# Patient Record
Sex: FEMALE | Race: WHITE | Employment: OTHER | URBAN - NONMETROPOLITAN AREA
[De-identification: names, ages, dates, MRNs, and addresses within clinical notes are randomized per-mention and may not be internally consistent; named-entity substitution may affect disease eponyms.]

---

## 2017-01-16 ENCOUNTER — TRANSCRIBE ORDERS (OUTPATIENT)
Dept: ADMINISTRATIVE | Facility: HOSPITAL | Age: 82
End: 2017-01-16

## 2017-01-16 ENCOUNTER — HOSPITAL ENCOUNTER (OUTPATIENT)
Dept: PULMONOLOGY | Facility: HOSPITAL | Age: 82
Discharge: HOME | End: 2017-01-16
Attending: INTERNAL MEDICINE
Payer: COMMERCIAL

## 2017-01-16 ENCOUNTER — GENERIC CONVERSION - ENCOUNTER (OUTPATIENT)
Dept: OTHER | Facility: OTHER | Age: 82
End: 2017-01-16

## 2017-01-16 ENCOUNTER — HOSPITAL ENCOUNTER (OUTPATIENT)
Dept: RADIOLOGY | Facility: HOSPITAL | Age: 82
Discharge: HOME | End: 2017-01-16
Attending: INTERNAL MEDICINE
Payer: COMMERCIAL

## 2017-01-16 ENCOUNTER — APPOINTMENT (OUTPATIENT)
Dept: LAB | Facility: HOSPITAL | Age: 82
End: 2017-01-16
Attending: INTERNAL MEDICINE
Payer: COMMERCIAL

## 2017-01-16 DIAGNOSIS — E78.5 HYPERLIPIDEMIA: ICD-10-CM

## 2017-01-16 DIAGNOSIS — M81.0 OSTEOPOROSIS, UNSPECIFIED: ICD-10-CM

## 2017-01-16 DIAGNOSIS — R05.9 COUGH: ICD-10-CM

## 2017-01-16 DIAGNOSIS — R05.9 COUGH: Primary | ICD-10-CM

## 2017-01-16 LAB
25(OH)D3 SERPL-MCNC: 23.1 NG/ML (ref 30–100)
ALBUMIN SERPL BCP-MCNC: 3.8 G/DL (ref 3.5–5)
ALP SERPL-CCNC: 145 U/L (ref 46–116)
ALT SERPL W P-5'-P-CCNC: 21 U/L (ref 12–78)
ANION GAP SERPL CALCULATED.3IONS-SCNC: 10 MMOL/L (ref 4–13)
AST SERPL W P-5'-P-CCNC: 18 U/L (ref 5–45)
BILIRUB SERPL-MCNC: 0.4 MG/DL (ref 0.2–1)
BUN SERPL-MCNC: 12 MG/DL (ref 5–25)
CALCIUM SERPL-MCNC: 8.9 MG/DL (ref 8.3–10.1)
CHLORIDE SERPL-SCNC: 102 MMOL/L (ref 100–108)
CHOLEST SERPL-MCNC: 275 MG/DL (ref 50–200)
CO2 SERPL-SCNC: 27 MMOL/L (ref 21–32)
CREAT SERPL-MCNC: 0.84 MG/DL (ref 0.6–1.3)
GFR SERPL CREATININE-BSD FRML MDRD: >60 ML/MIN/1.73SQ M
GLUCOSE SERPL-MCNC: 91 MG/DL (ref 65–140)
HDLC SERPL-MCNC: 56 MG/DL (ref 40–60)
LDLC SERPL CALC-MCNC: 183 MG/DL (ref 0–100)
POTASSIUM SERPL-SCNC: 4.5 MMOL/L (ref 3.5–5.3)
PROT SERPL-MCNC: 7.3 G/DL (ref 6.4–8.2)
SODIUM SERPL-SCNC: 139 MMOL/L (ref 136–145)
TRIGL SERPL-MCNC: 180 MG/DL

## 2017-01-16 PROCEDURE — 94729 DIFFUSING CAPACITY: CPT

## 2017-01-16 PROCEDURE — 94727 GAS DIL/WSHOT DETER LNG VOL: CPT

## 2017-01-16 PROCEDURE — 36415 COLL VENOUS BLD VENIPUNCTURE: CPT

## 2017-01-16 PROCEDURE — 82306 VITAMIN D 25 HYDROXY: CPT

## 2017-01-16 PROCEDURE — 71020 HB CHEST X-RAY 2VW FRONTAL&LATL: CPT

## 2017-01-16 PROCEDURE — 80053 COMPREHEN METABOLIC PANEL: CPT

## 2017-01-16 PROCEDURE — 80061 LIPID PANEL: CPT

## 2017-01-16 PROCEDURE — 94060 EVALUATION OF WHEEZING: CPT

## 2017-01-16 RX ORDER — ALBUTEROL SULFATE 2.5 MG/3ML
2.5 SOLUTION RESPIRATORY (INHALATION) ONCE AS NEEDED
Status: DISCONTINUED | OUTPATIENT
Start: 2017-01-16 | End: 2017-01-20 | Stop reason: HOSPADM

## 2017-03-28 ENCOUNTER — ALLSCRIPTS OFFICE VISIT (OUTPATIENT)
Dept: OTHER | Facility: OTHER | Age: 82
End: 2017-03-28

## 2017-06-19 ENCOUNTER — HOSPITAL ENCOUNTER (EMERGENCY)
Facility: HOSPITAL | Age: 82
Discharge: HOME | End: 2017-06-19
Attending: EMERGENCY MEDICINE | Admitting: EMERGENCY MEDICINE
Payer: COMMERCIAL

## 2017-06-19 VITALS
SYSTOLIC BLOOD PRESSURE: 184 MMHG | BODY MASS INDEX: 24.04 KG/M2 | HEIGHT: 58 IN | TEMPERATURE: 97.6 F | WEIGHT: 114.5 LBS | RESPIRATION RATE: 18 BRPM | OXYGEN SATURATION: 98 % | DIASTOLIC BLOOD PRESSURE: 70 MMHG | HEART RATE: 72 BPM
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
## 2017-06-19 DIAGNOSIS — S50.869A: Primary | ICD-10-CM

## 2017-06-19 PROCEDURE — 99283 EMERGENCY DEPT VISIT LOW MDM: CPT

## 2017-06-19 RX ORDER — PREDNISONE 20 MG/1
60 TABLET ORAL ONCE
Status: COMPLETED | OUTPATIENT
Start: 2017-06-19 | End: 2017-06-19

## 2017-06-19 RX ORDER — PENICILLIN V POTASSIUM 250 MG/1
500 TABLET ORAL ONCE
Status: COMPLETED | OUTPATIENT
Start: 2017-06-19 | End: 2017-06-19

## 2017-06-19 RX ORDER — PREDNISONE 20 MG/1
TABLET ORAL
Qty: 15 TABLET | Refills: 0 | Status: SHIPPED | OUTPATIENT
Start: 2017-06-19 | End: 2018-03-29

## 2017-06-19 RX ORDER — CEPHALEXIN 500 MG/1
500 CAPSULE ORAL 4 TIMES DAILY
Qty: 28 CAPSULE | Refills: 0 | Status: SHIPPED | OUTPATIENT
Start: 2017-06-19 | End: 2017-06-26

## 2017-06-19 RX ORDER — HYDROXYZINE HYDROCHLORIDE 25 MG/1
25 TABLET, FILM COATED ORAL EVERY 6 HOURS
Qty: 20 TABLET | Refills: 0 | Status: SHIPPED | OUTPATIENT
Start: 2017-06-19 | End: 2018-03-29

## 2017-06-19 RX ORDER — HYDROXYZINE HYDROCHLORIDE 25 MG/1
25 TABLET, FILM COATED ORAL ONCE
Status: COMPLETED | OUTPATIENT
Start: 2017-06-19 | End: 2017-06-19

## 2017-06-19 RX ORDER — OMEPRAZOLE 20 MG/1
20 CAPSULE, DELAYED RELEASE ORAL DAILY
COMMUNITY
End: 2018-03-29

## 2017-06-19 RX ORDER — CITALOPRAM 10 MG/1
10 TABLET ORAL DAILY
COMMUNITY
End: 2018-01-25 | Stop reason: SDUPTHER

## 2017-06-19 RX ADMIN — PENICILLIN V POTASIUM 500 MG: 250 TABLET ORAL at 21:52

## 2017-06-19 RX ADMIN — HYDROXYZINE HYDROCHLORIDE 25 MG: 25 TABLET, FILM COATED ORAL at 21:52

## 2017-06-19 RX ADMIN — PREDNISONE 60 MG: 20 TABLET ORAL at 21:51

## 2017-06-21 ENCOUNTER — ALLSCRIPTS OFFICE VISIT (OUTPATIENT)
Dept: OTHER | Facility: OTHER | Age: 82
End: 2017-06-21

## 2017-06-29 ENCOUNTER — HOSPITAL ENCOUNTER (OUTPATIENT)
Dept: MAMMOGRAPHY | Facility: HOSPITAL | Age: 82
Discharge: HOME | End: 2017-06-29
Attending: SURGERY
Payer: COMMERCIAL

## 2017-06-29 DIAGNOSIS — Z12.31 ENCOUNTER FOR MAMMOGRAM TO ESTABLISH BASELINE MAMMOGRAM: ICD-10-CM

## 2017-06-29 DIAGNOSIS — C50.011 PAGET'S DISEASE OF THE BREAST, RIGHT (HCC): ICD-10-CM

## 2017-06-29 PROCEDURE — G0202 SCR MAMMO BI INCL CAD: HCPCS

## 2017-06-29 PROCEDURE — 77063 BREAST TOMOSYNTHESIS BI: CPT

## 2017-07-05 ENCOUNTER — GENERIC CONVERSION - ENCOUNTER (OUTPATIENT)
Dept: OTHER | Facility: OTHER | Age: 82
End: 2017-07-05

## 2017-07-13 ENCOUNTER — OFFICE VISIT (OUTPATIENT)
Dept: URGENT CARE | Facility: CLINIC | Age: 82
End: 2017-07-13
Payer: COMMERCIAL

## 2017-07-13 PROCEDURE — 99213 OFFICE O/P EST LOW 20 MIN: CPT

## 2017-07-17 ENCOUNTER — ALLSCRIPTS OFFICE VISIT (OUTPATIENT)
Dept: OTHER | Facility: OTHER | Age: 82
End: 2017-07-17

## 2017-08-01 ENCOUNTER — GENERIC CONVERSION - ENCOUNTER (OUTPATIENT)
Dept: OTHER | Facility: OTHER | Age: 82
End: 2017-08-01

## 2017-09-28 ENCOUNTER — ALLSCRIPTS OFFICE VISIT (OUTPATIENT)
Dept: OTHER | Facility: OTHER | Age: 82
End: 2017-09-28

## 2017-10-03 ENCOUNTER — TRANSCRIBE ORDERS (OUTPATIENT)
Dept: RADIOLOGY | Facility: MEDICAL CENTER | Age: 82
End: 2017-10-03

## 2017-10-03 ENCOUNTER — APPOINTMENT (OUTPATIENT)
Dept: LAB | Facility: MEDICAL CENTER | Age: 82
End: 2017-10-03
Payer: COMMERCIAL

## 2017-10-03 DIAGNOSIS — M81.0 SENILE OSTEOPOROSIS: ICD-10-CM

## 2017-10-03 DIAGNOSIS — M81.0 SENILE OSTEOPOROSIS: Primary | ICD-10-CM

## 2017-10-03 DIAGNOSIS — E78.5 OTHER AND UNSPECIFIED HYPERLIPIDEMIA: ICD-10-CM

## 2017-10-03 LAB
25(OH)D3 SERPL-MCNC: 31.3 NG/ML (ref 30–100)
LDLC SERPL DIRECT ASSAY-MCNC: 175 MG/DL (ref 0–100)
TRIGL SERPL-MCNC: 129 MG/DL

## 2017-10-03 PROCEDURE — 36415 COLL VENOUS BLD VENIPUNCTURE: CPT

## 2017-10-03 PROCEDURE — 84478 ASSAY OF TRIGLYCERIDES: CPT

## 2017-10-03 PROCEDURE — 83721 ASSAY OF BLOOD LIPOPROTEIN: CPT

## 2017-10-03 PROCEDURE — 82306 VITAMIN D 25 HYDROXY: CPT

## 2017-12-11 ENCOUNTER — ALLSCRIPTS OFFICE VISIT (OUTPATIENT)
Dept: OTHER | Facility: OTHER | Age: 82
End: 2017-12-11

## 2017-12-11 ENCOUNTER — APPOINTMENT (OUTPATIENT)
Dept: LAB | Facility: MEDICAL CENTER | Age: 82
End: 2017-12-11
Payer: COMMERCIAL

## 2017-12-11 ENCOUNTER — TRANSCRIBE ORDERS (OUTPATIENT)
Dept: LAB | Facility: MEDICAL CENTER | Age: 82
End: 2017-12-11

## 2017-12-11 DIAGNOSIS — R10.31 ABDOMINAL PAIN, RIGHT LOWER QUADRANT: Primary | ICD-10-CM

## 2017-12-11 LAB
BILIRUB UR QL STRIP: NEGATIVE
CLARITY UR: CLEAR
COLOR UR: YELLOW
GLUCOSE UR STRIP-MCNC: NEGATIVE MG/DL
HGB UR QL STRIP.AUTO: NEGATIVE
KETONES UR STRIP-MCNC: NEGATIVE MG/DL
LEUKOCYTE ESTERASE UR QL STRIP: NEGATIVE
NITRITE UR QL STRIP: NEGATIVE
PH UR STRIP.AUTO: 6 [PH] (ref 4.5–8)
PROT UR STRIP-MCNC: NEGATIVE MG/DL
SP GR UR STRIP.AUTO: 1.01 (ref 1–1.03)
UROBILINOGEN UR QL STRIP.AUTO: 0.2 E.U./DL

## 2017-12-11 PROCEDURE — 81003 URINALYSIS AUTO W/O SCOPE: CPT | Performed by: INTERNAL MEDICINE

## 2017-12-12 NOTE — PROGRESS NOTES
Assessment    1  Right lower quadrant abdominal pain (789 03) (R10 31)  2  Esophageal reflux (306 81) (K21 9)    Plan  Esophageal reflux    · Start: Omeprazole 40 MG Oral Capsule Delayed Release; Take 1 daily  Right lower quadrant abdominal pain    · (1) URINALYSIS w URINE C/S REFLEX (will reflex a microscopy if leukocytes, occultblood, or nitrites are not within normal limits); Status:Active; Requested for:[id number];    · * CT CHEST ABDOMEN PELVIS WO CONTRAST; Status:Need Information - FinancialAuthorization; Requested for:[id number];     Discussion/Summary    Rx omeprazole daily, Bernardine Saran diet reviewed and encouraged  Increase fluids  CT scan of abdomen to rule out acute process, Can use colace until results available  Rest,fluids,bland diet as tolerated1     Possible side effects of new medications were reviewed with the patient/guardian today  The treatment plan was reviewed with the patient/guardian  The patient/guardian understands and agrees with the treatment plan      Self Referrals: No       1 Amended By: Kameron Weinstein; Dec 11 2017 12:04 PM EST    Chief Complaint  Pt presents for sick visit  Pt has c/o mid epigastric pain, loss of appetite, belching and constipation  States she has been eating mainly broth the past few days due to symptoms  No OTC meds for symptoms  No falls  No refills needed today  History of Present Illness  HPI: Patient has had abdominal pain right sided mainly for about a week  Rich diet  Has been constipated  No fever/chills  Has been nauseous  NBowels sluggish for a week or longer,decrease appetite  Belching more  Review of Systems   Constitutional: no fever-- and-- no chills  ENT: no ear ache, no loss of hearing, no nosebleeds or nasal discharge, no sore throat or hoarseness  Cardiovascular: no complaints of slow or fast heart rate, no chest pain, no palpitations, no leg claudication or lower extremity edema    Respiratory: no complaints of shortness of breath, no wheezing, no dyspnea on exertion, no orthopnea or PND  Gastrointestinal: abdominal pain,-- nausea-- and-- constipation, but-- no vomiting  Genitourinary: no complaints of dysuria, no incontinence, no pelvic pain, no dysmenorrhea, no vaginal discharge or abnormal vaginal bleeding  Musculoskeletal: arthralgias  Integumentary: no complaints of skin rash or lesion, no itching or dry skin, no skin wounds  Neurological: no complaints of headache, no confusion, no numbness or tingling, no dizziness or fainting  Active Problems  1  Advance directive discussed with patient (V65 49) (Z71 89)  2  Anxiety (300 00) (F41 9)  3  Breast cancer (174 9) (C50 919)  4  Cellulitis of extremity (L03 119)  5  Cellulitis of left upper extremity (682 3) (L03 114)  6  Cough (786 2) (R05)  7  Hiatal hernia (553 3) (K44 9)  8  Hyperlipidemia (272 4) (E78 5)  9  Post-menopausal osteoporosis (733 01) (M81 0)  10  Refused influenza vaccine (V64 06) (Z28 21)  11  Refused pneumococcal vaccination (V64 06) (Z28 21)    Past Medical History  Active Problems And Past Medical History Reviewed: The active problems and past medical history were reviewed and updated today  Surgical History  Surgical History Reviewed: The surgical history was reviewed and updated today  Social History     · Caffeine Use   · Dental care, regularly   · Denied: Drug use (305 90) (F19 90)   · Good dental hygiene   · Never a smoker   · No alcohol use   · Uses Safety Equipment - Seatbelts   ·   The social history was reviewed and updated today  The social history was reviewed and is unchanged  Family History  Family History Reviewed: The family history was reviewed and updated today  Current Meds  1  Citalopram Hydrobromide 10 MG Oral Tablet; TAKE ONE (1) TABLET DAILY; Therapy: 55WTX5563 to (Evaluate:33Hdb2370)  Requested for: 28Mar2017; Last Rx:28Mar2017 Ordered  2  Lumigan 0 01 % Ophthalmic Solution;  Therapy: 81WCI3887 to (Last Rx:63Kmb9318)  Requested for: 06NCM4788 Ordered  3  Triamcinolone Acetonide 0 025 % External Lotion; APPLY 2-3 TIMES DAILY TO AFFECTED AREA(S); Therapy: 19SOW9675 to (Last Rx:71Yeu2541)  Requested for: 70Hbl2763 Ordered    The medication list was reviewed and updated today  Allergies  1  No Known Drug Allergies  2  Adhesive Tape    Vitals   Recorded: R9912921 10:12AM Recorded: 85Klw4976 09:59AM   Temperature  95 3 F, Tympanic   Heart Rate  96   Systolic 386    Diastolic 72    Height  4 ft 10 in   Weight  109 lb 2 oz   BMI Calculated  22 81   BSA Calculated  1 41   O2 Saturation  100, RA       Physical Exam   Constitutional  General appearance: No acute distress, well appearing and well nourished  Eyes  Conjunctiva and lids: No swelling, erythema or discharge  Pupils and irises: Equal, round and reactive to light  Ears, Nose, Mouth, and Throat  External inspection of ears and nose: Normal    Otoscopic examination: Tympanic membranes translucent with normal light reflex  Canals patent without erythema  Nasal mucosa, septum, and turbinates: Normal without edema or erythema  Oropharynx: Normal with no erythema, edema, exudate or lesions  Pulmonary  Respiratory effort: No increased work of breathing or signs of respiratory distress  Auscultation of lungs: Clear to auscultation  Cardiovascular  Palpation of heart: Normal PMI, no thrills  Auscultation of heart: Normal rate and rhythm, normal S1 and S2, without murmurs  Examination of extremities for edema and/or varicosities: Normal    Carotid pulses: Normal    Abdomen  Abdomen: Abnormal  -- Ttp midepigastric and rlq area some guarding1   Liver and spleen: No hepatomegaly or splenomegaly  Lymphatic  Palpation of lymph nodes in neck: No lymphadenopathy  Musculoskeletal  Gait and station: Normal    Digits and nails: Normal without clubbing or cyanosis     Inspection/palpation of joints, bones, and muscles: Normal    Skin  Skin and subcutaneous tissue: Normal without rashes or lesions  Neurologic  Cranial nerves: Cranial nerves 2-12 intact  Reflexes: 2+ and symmetric  Sensation: No sensory loss     Psychiatric  Orientation to person, place, and time: Normal    Mood and affect: Normal           1 Amended By: Arian Smith; Dec 11 2017 12:03 PM EST    Future Appointments    Date/Time Provider Specialty Site   01/25/2018 09:30 AM Arian Smith DO Internal Medicine [facility] [place] [facility] [address]       Signatures   Electronically signed by : Rafael Smith DO; Dec 11 2017 12:04PM EST                       (Author)

## 2017-12-13 ENCOUNTER — HOSPITAL ENCOUNTER (OUTPATIENT)
Dept: CT IMAGING | Facility: HOSPITAL | Age: 82
Discharge: HOME | End: 2017-12-13
Attending: INTERNAL MEDICINE
Payer: COMMERCIAL

## 2017-12-13 ENCOUNTER — GENERIC CONVERSION - ENCOUNTER (OUTPATIENT)
Dept: OTHER | Facility: OTHER | Age: 82
End: 2017-12-13

## 2017-12-13 DIAGNOSIS — R10.31 ABDOMINAL PAIN, RIGHT LOWER QUADRANT: ICD-10-CM

## 2017-12-13 PROCEDURE — 71250 CT THORAX DX C-: CPT

## 2017-12-13 PROCEDURE — 74176 CT ABD & PELVIS W/O CONTRAST: CPT

## 2018-01-14 VITALS
SYSTOLIC BLOOD PRESSURE: 124 MMHG | HEIGHT: 58 IN | TEMPERATURE: 97.3 F | DIASTOLIC BLOOD PRESSURE: 78 MMHG | OXYGEN SATURATION: 98 % | WEIGHT: 112.38 LBS | BODY MASS INDEX: 23.59 KG/M2 | HEART RATE: 85 BPM

## 2018-01-14 VITALS
TEMPERATURE: 98.2 F | OXYGEN SATURATION: 99 % | BODY MASS INDEX: 24.01 KG/M2 | DIASTOLIC BLOOD PRESSURE: 78 MMHG | HEART RATE: 64 BPM | WEIGHT: 114.38 LBS | HEIGHT: 58 IN | SYSTOLIC BLOOD PRESSURE: 122 MMHG

## 2018-01-14 VITALS
WEIGHT: 111.5 LBS | BODY MASS INDEX: 23.41 KG/M2 | OXYGEN SATURATION: 98 % | SYSTOLIC BLOOD PRESSURE: 124 MMHG | TEMPERATURE: 97 F | HEIGHT: 58 IN | HEART RATE: 72 BPM | DIASTOLIC BLOOD PRESSURE: 72 MMHG

## 2018-01-15 VITALS
SYSTOLIC BLOOD PRESSURE: 124 MMHG | DIASTOLIC BLOOD PRESSURE: 72 MMHG | TEMPERATURE: 97.6 F | WEIGHT: 110.25 LBS | HEART RATE: 86 BPM | BODY MASS INDEX: 23.14 KG/M2 | OXYGEN SATURATION: 98 % | HEIGHT: 58 IN

## 2018-01-23 VITALS
HEART RATE: 96 BPM | WEIGHT: 109.13 LBS | BODY MASS INDEX: 22.91 KG/M2 | DIASTOLIC BLOOD PRESSURE: 72 MMHG | SYSTOLIC BLOOD PRESSURE: 124 MMHG | OXYGEN SATURATION: 100 % | TEMPERATURE: 95.3 F | HEIGHT: 58 IN

## 2018-01-24 PROBLEM — Z78.9: Status: ACTIVE | Noted: 2018-01-24

## 2018-01-24 PROBLEM — Z28.21 REFUSED INFLUENZA VACCINE: Status: ACTIVE | Noted: 2018-01-24

## 2018-01-24 PROBLEM — M81.0 POST-MENOPAUSAL OSTEOPOROSIS: Status: ACTIVE | Noted: 2018-01-24

## 2018-01-24 PROBLEM — E78.5 HYPERLIPIDEMIA: Status: ACTIVE | Noted: 2018-01-24

## 2018-01-24 PROBLEM — K44.9 HIATAL HERNIA: Status: ACTIVE | Noted: 2018-01-24

## 2018-01-24 PROBLEM — F41.9 ANXIETY: Status: ACTIVE | Noted: 2018-01-24

## 2018-01-24 PROBLEM — C50.919 MALIGNANT NEOPLASM OF BREAST (HCC): Status: ACTIVE | Noted: 2018-01-24

## 2018-01-24 PROBLEM — K21.9 ESOPHAGEAL REFLUX: Status: ACTIVE | Noted: 2018-01-24

## 2018-01-24 RX ORDER — CITALOPRAM 10 MG/1
1 TABLET ORAL DAILY
COMMUNITY
Start: 2013-11-04 | End: 2018-01-25 | Stop reason: SDUPTHER

## 2018-01-24 RX ORDER — PROMETHAZINE HYDROCHLORIDE AND CODEINE PHOSPHATE 6.25; 1 MG/5ML; MG/5ML
5 SYRUP ORAL EVERY 6 HOURS PRN
COMMUNITY
Start: 2017-09-28 | End: 2018-01-25 | Stop reason: SDUPTHER

## 2018-01-24 RX ORDER — OMEPRAZOLE 40 MG/1
1 CAPSULE, DELAYED RELEASE ORAL DAILY
COMMUNITY
Start: 2017-12-11 | End: 2018-03-29

## 2018-01-25 ENCOUNTER — OFFICE VISIT (OUTPATIENT)
Dept: INTERNAL MEDICINE CLINIC | Facility: CLINIC | Age: 83
End: 2018-01-25
Payer: COMMERCIAL

## 2018-01-25 VITALS
SYSTOLIC BLOOD PRESSURE: 130 MMHG | HEIGHT: 58 IN | OXYGEN SATURATION: 97 % | HEART RATE: 67 BPM | BODY MASS INDEX: 22.88 KG/M2 | TEMPERATURE: 95.8 F | DIASTOLIC BLOOD PRESSURE: 72 MMHG | WEIGHT: 109 LBS

## 2018-01-25 DIAGNOSIS — F32.9 REACTIVE DEPRESSION: ICD-10-CM

## 2018-01-25 DIAGNOSIS — M81.0 AGE-RELATED OSTEOPOROSIS WITHOUT CURRENT PATHOLOGICAL FRACTURE: ICD-10-CM

## 2018-01-25 DIAGNOSIS — Z00.00 MEDICARE ANNUAL WELLNESS VISIT, SUBSEQUENT: Primary | ICD-10-CM

## 2018-01-25 DIAGNOSIS — R05.9 COUGH: ICD-10-CM

## 2018-01-25 PROCEDURE — G0439 PPPS, SUBSEQ VISIT: HCPCS | Performed by: INTERNAL MEDICINE

## 2018-01-25 RX ORDER — CITALOPRAM 10 MG/1
10 TABLET ORAL DAILY
Qty: 30 TABLET | Refills: 5 | Status: SHIPPED | OUTPATIENT
Start: 2018-01-25 | End: 2018-03-29

## 2018-01-25 RX ORDER — PROMETHAZINE HYDROCHLORIDE AND CODEINE PHOSPHATE 6.25; 1 MG/5ML; MG/5ML
5 SYRUP ORAL EVERY 6 HOURS PRN
Qty: 120 ML | Refills: 1 | Status: SHIPPED | OUTPATIENT
Start: 2018-01-25 | End: 2018-05-01 | Stop reason: ALTCHOICE

## 2018-01-25 NOTE — PROGRESS NOTES
HPI:  Gaby Weinberg is a [de-identified] y o  female here for her Subsequent Wellness Visit      Patient Active Problem List   Diagnosis    Anxiety    Malignant neoplasm of breast ([address] )    Esophageal reflux    Hiatal hernia    Hyperlipidemia    Post-menopausal osteoporosis    Refused influenza vaccine    Consent not given for pneumococcal immunization    Cough     Past Medical History:   Diagnosis Date    Cellulitis of left upper arm     History of anxiety     History of appendicitis     History of breast cancer     History of cancer     breast    History of gastroesophageal reflux (GERD)      Past Surgical History:   Procedure Laterality Date    APPENDECTOMY      Date Unknown    CATARACT EXTRACTION Bilateral     Left 2008 // Right 2008     SECTION      Date Unknown    MASTECTOMY Right 2007    PARTIAL HYSTERECTOMY      fallopian tube removed, ? side    TUBAL LIGATION      Date Unknown     Family History   Problem Relation Age of Onset    Lung cancer Mother     Colon cancer Sister     Hypertension Family     Drug abuse Neg Hx      History   Smoking Status    Never Smoker   Smokeless Tobacco    Never Used     History   Alcohol Use No      History   Drug Use No       Current Outpatient Prescriptions   Medication Sig Dispense Refill    bimatoprost (LUMIGAN) 0 01 % ophthalmic drops Administer 1 drop to both eyes daily at bedtime      citalopram (CeleXA) 10 mg tablet Take 1 tablet by mouth daily 30 tablet 5    promethazine-codeine (PHENERGAN WITH CODEINE) 6 25-10 mg/5 mL syrup Take 5 mL by mouth every 6 (six) hours as needed (cough) 120 mL 1    hydrOXYzine HCL (ATARAX) 25 mg tablet Take 1 tablet by mouth every 6 (six) hours Prn itching 20 tablet 0    omeprazole (PriLOSEC) 20 mg delayed release capsule Take 20 mg by mouth daily      omeprazole (PriLOSEC) 40 MG capsule Take 1 capsule by mouth daily      predniSONE 20 mg tablet 60 mg po qd x 5 days 15 tablet 0     No current facility-administered medications for this visit  Allergies   Allergen Reactions    Other Itching     Adhesive Tape     There is no immunization history for the selected administration types on file for this patient      Patient Care Team:  Lindsey Yao, DO as PCP - General  MD Jazmyne Anna MD Laurice Fearing, DO      Medicare Screening Tests and Risk Assessments:  AWV Clinical     ISAR:       Once in a Lifetime Medicare Screening:   EKG performed?:  No    AAA screening performed? (if performed, please add date to Health Maintenance):  No       Medicare Screening Tests and Risk Assessment:   AAA Risk Assessment     Family history of AAA:  No   Osteoporosis Risk Assessment     Female:  Yes   :  Yes :  No   Age over 48:  Yes Low body weight (<127lbs):  Yes   Tobacco use:  No Alcohol use:  No   Low calcium diet:  No PMHX of fractures:  No   HIV Risk Assessment    None indicated:  Yes        Drug and Alcohol Use:   Tobacco use    Tobacco use duration    Tobacco Cessation Readiness    Alcohol use    Alcohol Treatment Readiness   Illicit Drug Use        Diet & Exercise:   Diet   What is your diet?:  Regular   How many servings a day of the following:   Exercise    Do you currently exercise?:  currently not exercising       Cognitive Impairment Screening:   Cognitive Impairment Screening        Functional Ability/Level of Safety:   Hearing    Hearing difficulties:  No    Hearing Impairment Assessment    Current Activities    Status:  unlimited ADL's   Help needed with the folllowing:    ADL    Fall Risk   Have you fallen in the last 12 months?:  No Are you unsteady on your feet?:  Yes   Injury History       Home Safety:   Are there hazards in your environment?:  No   Home Safety Risk Factors   Unfamilar with surroundings:  No Uneven floors:  No   Stairs or handrail saftey risk:  Yes Loose rugs:  No   Household clutter:  No Poor household lighting:  No   No grab bars in bathroom:  No Further evaluation needed:  No       Advanced Directives:   Advanced Directives    Living Will:  Yes    Advanced directive:  Yes    Patient's End of Life Decisions        Urinary Incontinence:   Do you have urinary incontinence?:  No Do you have incomplete emptying?:  No   Do you urinate frequently?:  No Do you have urinary urgency?:  No   Do you have urinary hesitancy?:  No Do you have dysuria (painful and/or difficult urination)?:  No   Do you have nocturia (waking up to urinate)?:  Yes Do you strain when urinating (have to push to urinate)?:  No   Do you have a weak stream when urinating?:  No Do you have intermittent streaming when urinating?:  No   Do you dribble urine after finishing?:  No    Do you have vaginal pressure?:  No Do you have vaginal dryness?:  No       Glaucoma:            Provider Screening     Preventative Screening/Counseling:   Cardiovascular Screening/Counseling:   (Labs Q5 years, EKG optional one-time)         Diabetes Screening/Counseling:   (2 tests/year if Pre-Diabetes or 1 test/year if no Diabetes)         Colorectal Cancer Screening/Counseling:   (FOBT Q1 yr; Flex Sig Q4 yrs or Q10 yrs after Screening Colonoscopy; Screening Colonoscpy Q2 yrs High Risk or Q10 yrs Low Risk; Barium Enema Q2 yrs High Risk or Q4 yrs Low Risk)         Prostate Cancer Screening/Counseling:   (Annual)          Breast Cancer Screening/Counseling:   (Baseline Age 28 - 43; Annual Age 36+)         Cervical Cancer Screening/Counseling:   (Annual for High Risk or Childbearing Age with Abnormal Pap in Last 3 yrs; Every 2 all others)         Osteoporosis Screening/Counseling:   (Every 2 Yrs if at risk or more if medically necessary)         AAA Screening/Counseling:   (Once per Lifetime with risk factors)    Family History of AAA:  No           Glaucoma Screening/Counseling:   (Annual)         HIV Screening/Counseling:   (Voluntary;  Once annually for high risk OR 3 times for Pregnancy at diagnosis of IUP; 3rd trimester; and at Labor         Hepatitis C Screening:             Immunizations: Other Preventative Couseling (Non-Medicare Wellness Visit Required):       Referrals (Non-Medicare Wellness Visit Required):       Medical Equipment/Suppliers:           No exam data present  Reviewed Updated [facility] Prior Wellness Visits:   Last Medicare wellness visit information was reviewed, patient interviewed , no change since last AWVyes  Last Medicare wellness visit information was reviewed, patient interviewed and updates made to the record today yes    Assessment and Plan:  1  Medicare annual wellness visit, subsequent     2  Age-related osteoporosis without current pathological fracture  DXA bone density spine hip and pelvis   3  Reactive depression  citalopram (CeleXA) 10 mg tablet   4   Cough  promethazine-codeine (PHENERGAN WITH CODEINE) 6 25-10 mg/5 mL syrup     Dexa scan ordered and she will get in the spring  Increase fluids intake and encouraged home exercise program  Patient deferred flu or pneumonia shot today  She will monitor her BP at home as for her the reading was higher today and call sooner if trend increase  Encouraged increased protein in diet as eating habits are not consistent  Followup in 3 months/prn  Health Maintenance Due   Topic Date Due    SLP PLAN OF CARE  1935    PNEUMOCOCCAL POLYSACCHARIDE VACCINE AGE 72 AND OVER  09/05/2002

## 2018-03-29 ENCOUNTER — TELEPHONE (OUTPATIENT)
Dept: INTERNAL MEDICINE CLINIC | Facility: CLINIC | Age: 83
End: 2018-03-29

## 2018-03-29 ENCOUNTER — APPOINTMENT (EMERGENCY)
Dept: RADIOLOGY | Facility: HOSPITAL | Age: 83
End: 2018-03-29
Payer: COMMERCIAL

## 2018-03-29 ENCOUNTER — HOSPITAL ENCOUNTER (EMERGENCY)
Facility: HOSPITAL | Age: 83
Discharge: HOME | End: 2018-03-29
Payer: COMMERCIAL

## 2018-03-29 VITALS
SYSTOLIC BLOOD PRESSURE: 167 MMHG | HEIGHT: 58 IN | BODY MASS INDEX: 23.88 KG/M2 | RESPIRATION RATE: 19 BRPM | HEART RATE: 90 BPM | OXYGEN SATURATION: 100 % | DIASTOLIC BLOOD PRESSURE: 77 MMHG | WEIGHT: 113.76 LBS | TEMPERATURE: 98.5 F

## 2018-03-29 DIAGNOSIS — J40 BRONCHITIS: Primary | ICD-10-CM

## 2018-03-29 DIAGNOSIS — J02.9 SORE THROAT: Primary | ICD-10-CM

## 2018-03-29 PROCEDURE — 71046 X-RAY EXAM CHEST 2 VIEWS: CPT

## 2018-03-29 PROCEDURE — 94640 AIRWAY INHALATION TREATMENT: CPT

## 2018-03-29 PROCEDURE — 99283 EMERGENCY DEPT VISIT LOW MDM: CPT

## 2018-03-29 RX ORDER — ALBUTEROL SULFATE 90 UG/1
2 AEROSOL, METERED RESPIRATORY (INHALATION) EVERY 4 HOURS PRN
Qty: 1 INHALER | Refills: 0 | Status: SHIPPED | OUTPATIENT
Start: 2018-03-29 | End: 2019-11-08 | Stop reason: ALTCHOICE

## 2018-03-29 RX ORDER — DOXYCYCLINE HYCLATE 100 MG/1
100 CAPSULE ORAL 2 TIMES DAILY
Qty: 14 CAPSULE | Refills: 0 | Status: SHIPPED | OUTPATIENT
Start: 2018-03-29 | End: 2018-04-05

## 2018-03-29 RX ORDER — ALBUTEROL SULFATE 2.5 MG/3ML
2.5 SOLUTION RESPIRATORY (INHALATION) ONCE
Status: COMPLETED | OUTPATIENT
Start: 2018-03-29 | End: 2018-03-29

## 2018-03-29 RX ORDER — AMOXICILLIN 500 MG/1
500 TABLET, FILM COATED ORAL 3 TIMES DAILY
Qty: 21 TABLET | Refills: 0 | Status: SHIPPED | OUTPATIENT
Start: 2018-03-29 | End: 2018-03-29

## 2018-03-29 RX ADMIN — ALBUTEROL SULFATE 2.5 MG: 2.5 SOLUTION RESPIRATORY (INHALATION) at 11:03

## 2018-03-29 NOTE — ED PROVIDER NOTES
History  Chief Complaint   Patient presents with    URI     sore throat, swollen glands  Cough with expectoration yellow mucus  right ear pain  Patient presents to the emergency department today via private vehicle offering a chief complaint nasal congestion sore throat and a productive cough that has been present about 3-4 days  Patient denies chest pain  She denies back pain nausea or vomiting  She has felt chilled  No history of diaphoresis  No underlying lung disease  Nonsmoker  Patient states the cough produces yellow thick mucus  Prior to Admission Medications   Prescriptions Last Dose Informant Patient Reported? Taking?   bimatoprost (LUMIGAN) 0 01 % ophthalmic drops   Yes Yes   Sig: Administer 1 drop to both eyes daily at bedtime   promethazine-codeine (PHENERGAN WITH CODEINE) 6 25-10 mg/5 mL syrup   No Yes   Sig: Take 5 mL by mouth every 6 (six) hours as needed (cough)      Facility-Administered Medications: None       Past Medical History:   Diagnosis Date    Cellulitis of left upper arm     History of anxiety     History of appendicitis     History of breast cancer     History of cancer     breast    History of gastroesophageal reflux (GERD)        Past Surgical History:   Procedure Laterality Date    APPENDECTOMY      Date Unknown    CATARACT EXTRACTION Bilateral     Left 2008 // Right 2008     SECTION      Date Unknown    MASTECTOMY Right 2007    PARTIAL HYSTERECTOMY      fallopian tube removed, ? side    TUBAL LIGATION      Date Unknown       Family History   Problem Relation Age of Onset    Lung cancer Mother     Colon cancer Sister     Hypertension Family     Drug abuse Neg Hx      I have reviewed and agree with the history as documented  Social History   Substance Use Topics    Smoking status: Never Smoker    Smokeless tobacco: Never Used    Alcohol use No        Review of Systems   Constitutional: Positive for chills   Negative for activity change, appetite change, diaphoresis, fatigue, fever and unexpected weight change  HENT: Positive for congestion, ear pain, postnasal drip and sore throat  Negative for dental problem, drooling, ear discharge, facial swelling, hearing loss, mouth sores, nosebleeds, rhinorrhea, sinus pain, sinus pressure, sneezing, tinnitus, trouble swallowing and voice change  Eyes: Negative  Respiratory: Positive for cough  Negative for apnea, chest tightness and stridor  Cardiovascular: Negative  Gastrointestinal: Negative  Endocrine: Negative  Genitourinary: Negative  Musculoskeletal: Negative  Skin: Negative  Allergic/Immunologic: Negative  Neurological: Negative  Hematological: Negative  Psychiatric/Behavioral: Negative  All other systems reviewed and are negative  Physical Exam  ED Triage Vitals [03/29/18 1022]   Temperature Pulse Respirations Blood Pressure SpO2   98 5 °F (36 9 °C) 92 20 (!) 184/83 100 %      Temp Source Heart Rate Source Patient Position - Orthostatic VS BP Location FiO2 (%)   Temporal Monitor Sitting Left arm --      Pain Score       No Pain           Orthostatic Vital Signs  Vitals:    03/29/18 1100 03/29/18 1115 03/29/18 1130 03/29/18 1145   BP: (!) 171/77      Pulse: 89 91 88 91   Patient Position - Orthostatic VS:           Physical Exam   Constitutional: She is oriented to person, place, and time  She appears well-developed and well-nourished  No distress  HENT:   Head: Normocephalic and atraumatic  Right Ear: External ear normal    Left Ear: External ear normal    Nose: Nose normal    Mouth/Throat: Oropharynx is clear and moist  No oropharyngeal exudate  Eyes: Pupils are equal, round, and reactive to light  Neck: Normal range of motion  Cardiovascular: Normal rate, regular rhythm, normal heart sounds and intact distal pulses  Exam reveals no gallop and no friction rub  No murmur heard    Pulmonary/Chest: Effort normal  No respiratory distress  Scattered rhonchi that clears with cough   Abdominal: Soft  Musculoskeletal: Normal range of motion  Neurological: She is alert and oriented to person, place, and time  Skin: Skin is warm  Capillary refill takes less than 2 seconds  She is not diaphoretic  Psychiatric: She has a normal mood and affect  Vitals reviewed  ED Medications  Medications   albuterol inhalation solution 2 5 mg (2 5 mg Nebulization Given 3/29/18 1103)       Diagnostic Studies  Results Reviewed     None                 XR chest 2 views   ED Interpretation by Chester Sutton PA-C (03/29 1244)   No evidence of acute pulmonary process, similar to prior chest x-ray                 Procedures  Procedures       Phone Contacts  ED Phone Contact    ED Course  ED Course as of Mar 29 1253   Thu Mar 29, 2018   1046 SpO2: 100 %   1047 Respirations: 20   1047 Pulse: 92   1047 Blood Pressure: (!) 184/83   1113 Patient received a nebulizer treatment currently    1203 Patient feeling muchImproved after nebulizer treatment    1244 Patient was re-examined at 1245 hours  Patient states she feels much improved and wishes to be discharged  I advised that we will treat with doxycycline as well as an inhaler and I went over and had a utilize the inhaler  MDM  CritCare Time    Disposition  Final diagnoses:   Bronchitis     Time reflects when diagnosis was documented in both MDM as applicable and the Disposition within this note     Time User Action Codes Description Comment    3/29/2018 12:49 PM Robel Chong 49 Bronchitis       ED Disposition     ED Disposition Condition Comment    Discharge  45 Rocio Lowery discharge to home/self care      Condition at discharge: Good        Follow-up Information     Follow up With Specialties Details [facility] Emergency Department Emergency Medicine  If symptoms worsen [address] [place]  [phone] [place] [facility], [address], [place], [address] [facility],   Flora Ball 26, DO Internal Medicine Schedule an appointment as soon as possible for a visit  [address]  [address]  [phone]           Patient's Medications   Discharge Prescriptions    ALBUTEROL (PROVENTIL HFA,VENTOLIN HFA) 90 MCG/ACT INHALER    Inhale 2 puffs every 4 (four) hours as needed for wheezing       Start Date: 3/29/2018 End Date: --       Order Dose: 2 puffs       Quantity: 1 Inhaler    Refills: 0    DOXYCYCLINE HYCLATE (VIBRAMYCIN) 100 MG CAPSULE    Take 1 capsule (100 mg total) by mouth 2 (two) times a day for 7 days       Start Date: 3/29/2018 End Date: 4/5/2018       Order Dose: 100 mg       Quantity: 14 capsule    Refills: 0     No discharge procedures on file      ED Provider  Electronically Signed by           Nicky Lake PA-C  03/29/18 7139

## 2018-03-29 NOTE — ED NOTES
Pt states that she is feeling much better after breathing treatment   O2 level : 100% on 2L oxygen     Philip Bartlett RN  03/29/18 7626

## 2018-03-29 NOTE — DISCHARGE INSTRUCTIONS
Acute Bronchitis   WHAT YOU NEED TO KNOW:   Acute bronchitis is swelling and irritation in the air passages of your lungs  This irritation may cause you to cough or have other breathing problems  Acute bronchitis often starts because of another illness, such as a cold or the flu  The illness spreads from your nose and throat to your windpipe and airways  Bronchitis is often called a chest cold  Acute bronchitis lasts about 3 to 6 weeks and is usually not a serious illness  Your cough can last for several weeks  DISCHARGE INSTRUCTIONS:   Return to the emergency department if:   · You cough up blood  · Your lips or fingernails turn blue  · You feel like you are not getting enough air when you breathe  Contact your healthcare provider if:   · You have a fever  · Your breathing problems do not go away or get worse  · Your cough does not get better within 4 weeks  · You have questions or concerns about your condition or care  Self-care:   · Get more rest   Rest helps your body to heal  Slowly start to do more each day  Rest when you feel it is needed  · Avoid irritants in the air  Avoid chemicals, fumes, and dust  Wear a face mask if you must work around dust or fumes  Stay inside on days when air pollution levels are high  If you have allergies, stay inside when pollen counts are high  Do not use aerosol products, such as spray-on deodorant, bug spray, and hair spray  · Do not smoke or be around others who smoke  Nicotine and other chemicals in cigarettes and cigars damages the cilia that move mucus out of your lungs  Ask your healthcare provider for information if you currently smoke and need help to quit  E-cigarettes or smokeless tobacco still contain nicotine  Talk to your healthcare provider before you use these products  · Drink liquids as directed  Liquids help keep your air passages moist and help you cough up mucus   You may need to drink more liquids when you have acute bronchitis  Ask how much liquid to drink each day and which liquids are best for you  · Use a humidifier or vaporizer  Use a cool mist humidifier or a vaporizer to increase air moisture in your home  This may make it easier for you to breathe and help decrease your cough  Decrease risk for acute bronchitis:   · Get the vaccinations you need  Ask your healthcare provider if you should get vaccinated against the flu or pneumonia  · Prevent the spread of germs  You can decrease your risk of acute bronchitis and other illnesses by doing the following:     [facility] your hands often with soap and water  Carry germ-killing hand lotion or gel with you  You can use the lotion or gel to clean your hands when soap and water are not available  ¨ Do not touch your eyes, nose, or mouth unless you have washed your hands first     ¨ Always cover your mouth when you cough to prevent the spread of germs  It is best to cough into a tissue or your shirt sleeve instead of into your hand  Ask those around you cover their mouths when they cough  ¨ Try to avoid people who have a cold or the flu  If you are sick, stay away from others as much as possible  Medicines: Your healthcare provider may  give you any of the following:  · Ibuprofen or acetaminophen  are medicines that help lower your fever  They are available without a doctor's order  Ask your healthcare provider which medicine is right for you  Ask how much to take and how often to take it  Follow directions  These medicines can cause stomach bleeding if not taken correctly  Ibuprofen can cause kidney damage  Do not take ibuprofen if you have kidney disease, an ulcer, or allergies to aspirin  Acetaminophen can cause liver damage  Do not take more than 4,000 milligrams in 24 hours  · Decongestants  help loosen mucus in your lungs and make it easier to cough up  This can help you breathe easier  · Cough suppressants  decrease your urge to cough   If your cough produces mucus, do not take a cough suppressant unless your healthcare provider tells you to  Your healthcare provider may suggest that you take a cough suppressant at night so you can rest     · Inhalers  may be given  Your healthcare provider may give you one or more inhalers to help you breathe easier and cough less  An inhaler gives your medicine to open your airways  Ask your healthcare provider to show you how to use your inhaler correctly  · Take your medicine as directed  Contact your healthcare provider if you think your medicine is not helping or if you have side effects  Tell him of her if you are allergic to any medicine  Keep a list of the medicines, vitamins, and herbs you take  Include the amounts, and when and why you take them  Bring the list or the pill bottles to follow-up visits  Carry your medicine list with you in case of an emergency  Follow up with your healthcare provider as directed:  Write down questions you have so you will remember to ask them during your follow-up visits  © 2017 [address] [employer] Information is for End User's use only and may not be sold, redistributed or otherwise used for commercial purposes  All illustrations and images included in CareNotes® are the copyrighted property of [employer]  or [employer]  The above information is an  only  It is not intended as medical advice for individual conditions or treatments  Talk to your doctor, nurse or pharmacist before following any medical regimen to see if it is safe and effective for you

## 2018-03-29 NOTE — ED NOTES
Family member came out stating patient was having a hard time breathing, myself and Rasheed Chong PA-C reported to bedside, patient was having a "coughing attack" applied O2 nasal cannula at 4L per provider order        Lilia Gordon RN  03/29/18 0535

## 2018-05-01 ENCOUNTER — OFFICE VISIT (OUTPATIENT)
Dept: INTERNAL MEDICINE CLINIC | Facility: CLINIC | Age: 83
End: 2018-05-01
Payer: COMMERCIAL

## 2018-05-01 VITALS
BODY MASS INDEX: 22.75 KG/M2 | HEIGHT: 58 IN | DIASTOLIC BLOOD PRESSURE: 70 MMHG | WEIGHT: 108.38 LBS | OXYGEN SATURATION: 98 % | HEART RATE: 83 BPM | SYSTOLIC BLOOD PRESSURE: 124 MMHG | TEMPERATURE: 97.1 F

## 2018-05-01 DIAGNOSIS — E78.2 MIXED HYPERLIPIDEMIA: ICD-10-CM

## 2018-05-01 DIAGNOSIS — K21.9 GASTROESOPHAGEAL REFLUX DISEASE WITHOUT ESOPHAGITIS: Primary | ICD-10-CM

## 2018-05-01 DIAGNOSIS — C50.911 MALIGNANT NEOPLASM OF RIGHT FEMALE BREAST, UNSPECIFIED ESTROGEN RECEPTOR STATUS, UNSPECIFIED SITE OF BREAST (HCC): ICD-10-CM

## 2018-05-01 PROBLEM — Z00.00 MEDICARE ANNUAL WELLNESS VISIT, SUBSEQUENT: Status: ACTIVE | Noted: 2018-05-01

## 2018-05-01 PROCEDURE — 99214 OFFICE O/P EST MOD 30 MIN: CPT | Performed by: INTERNAL MEDICINE

## 2018-05-01 NOTE — PROGRESS NOTES
Assessment/Plan:         Diagnoses and all orders for this visit:    Gastroesophageal reflux disease without esophagitis  -     Comprehensive metabolic panel; Future  -     CBC and differential; Future  Labs ordered continue ppi - although patient seems to take more as needed now and sxs are managed    Mixed hyperlipidemia  -     Lipid panel; Future    Hx breast cancer - patient has surgery followup in early summer and will do mammo prior    Did discuss shingrix although she generally does not get vaccines but she will consider    RTO 4 months       Patient ID: Adrianna Foy is a [de-identified] y o  female  HPI  Patient doing ok overall  Has learned to let family issues go and feels less stressed  Had bronchitis over the winter and went to UC but sxs resolved with treatment  Anxious to get outside in the nicer weather  No falls  No chest pain or sob  The following portions of the patient's history were reviewed and updated as appropriate:   Past Medical History:   Diagnosis Date    Cellulitis of left upper arm     History of anxiety     History of appendicitis     History of breast cancer     History of cancer     breast    History of gastroesophageal reflux (GERD)      Past Surgical History:   Procedure Laterality Date    APPENDECTOMY      Date Unknown    CATARACT EXTRACTION Bilateral     Left 2008 // Right 2008     SECTION      Date Unknown    MASTECTOMY Right 2007    PARTIAL HYSTERECTOMY      fallopian tube removed, ? side    TUBAL LIGATION      Date Unknown     Allergies   Allergen Reactions    Other Itching     Adhesive Tape     Social History     Social History    Marital status:      Spouse name: N/A    Number of children: N/A    Years of education: N/A     Occupational History    Not on file       Social History Main Topics    Smoking status: Never Smoker    Smokeless tobacco: Never Used    Alcohol use No    Drug use: No    Sexual activity: Not on file     Other Topics Concern    Not on file     Social History Narrative    Caffeine Use    Dental Care, Regularly    Good Dental Hygiene    Uses Safety Equipment: [de-identified]             Family History   Problem Relation Age of Onset    Lung cancer Mother     Colon cancer Sister     Hypertension Family     Drug abuse Neg Hx          Review of Systems   Constitutional: Negative  Eating more consistently and healthy choices   HENT: Negative  Eyes: Negative  Respiratory: Negative  Cardiovascular: Negative  Gastrointestinal: Negative  Negative for abdominal pain  Endocrine: Negative  Genitourinary: Negative  Musculoskeletal: Negative  Skin: Negative  Allergic/Immunologic: Negative  Neurological: Negative  Hematological: Negative  Psychiatric/Behavioral: Negative  /70   Pulse 83   Temp (!) 97 1 °F (36 2 °C) (Tympanic)   Ht 4' 10" (1 473 m)   Wt 49 2 kg (108 lb 6 oz)   SpO2 98%   BMI 22 65 kg/m²      Physical Exam   Constitutional: She is oriented to person, place, and time  She appears well-developed and well-nourished  No distress  HENT:   Head: Normocephalic and atraumatic  Right Ear: External ear normal    Left Ear: External ear normal    Nose: Nose normal    Mouth/Throat: Oropharynx is clear and moist  No oropharyngeal exudate  Eyes: Conjunctivae and EOM are normal  Pupils are equal, round, and reactive to light  No scleral icterus  Neck: Normal range of motion  Neck supple  No JVD present  Cardiovascular: Normal rate, regular rhythm, normal heart sounds and intact distal pulses  Pulmonary/Chest: Effort normal and breath sounds normal    Abdominal: Soft  Bowel sounds are normal    Musculoskeletal: Normal range of motion  She exhibits no edema, tenderness or deformity  Lymphadenopathy:     She has no cervical adenopathy  Neurological: She is alert and oriented to person, place, and time  She has normal reflexes  She displays normal reflexes   No cranial nerve deficit  She exhibits normal muscle tone  Coordination normal    Skin: Skin is warm and dry  She is not diaphoretic  Psychiatric: She has a normal mood and affect  Her behavior is normal  Judgment and thought content normal    Nursing note and vitals reviewed

## 2018-05-01 NOTE — PROGRESS NOTES
HPI:  Gaby Veloz is a [de-identified] y o  female here for her Subsequent Wellness Visit  Pt generally well  No acute issues  No cp or sob  Patient Active Problem List   Diagnosis    Anxiety    Malignant neoplasm of breast ([address] )    Esophageal reflux    Hiatal hernia    Hyperlipidemia    Post-menopausal osteoporosis    Refused influenza vaccine    Consent not given for pneumococcal immunization    Cough    Medicare annual wellness visit, subsequent     Past Medical History:   Diagnosis Date    Cellulitis of left upper arm     History of anxiety     History of appendicitis     History of breast cancer     History of cancer     breast    History of gastroesophageal reflux (GERD)      Past Surgical History:   Procedure Laterality Date    APPENDECTOMY      Date Unknown    CATARACT EXTRACTION Bilateral     Left 2008 // Right 2008     SECTION      Date Unknown    MASTECTOMY Right 2007    PARTIAL HYSTERECTOMY      fallopian tube removed, ? side    TUBAL LIGATION      Date Unknown     Family History   Problem Relation Age of Onset    Lung cancer Mother     Colon cancer Sister     Hypertension Family     Drug abuse Neg Hx      History   Smoking Status    Never Smoker   Smokeless Tobacco    Never Used     History   Alcohol Use No      History   Drug Use No     /70   Pulse 83   Temp (!) 97 1 °F (36 2 °C) (Tympanic)   Ht 4' 10" (1 473 m)   Wt 49 2 kg (108 lb 6 oz)   SpO2 98%   BMI 22 65 kg/m²       Current Outpatient Prescriptions   Medication Sig Dispense Refill    bimatoprost (LUMIGAN) 0 01 % ophthalmic drops Administer 1 drop to both eyes daily at bedtime      albuterol (PROVENTIL HFA,VENTOLIN HFA) 90 mcg/act inhaler Inhale 2 puffs every 4 (four) hours as needed for wheezing 1 Inhaler 0     No current facility-administered medications for this visit        Allergies   Allergen Reactions    Other Itching     Adhesive Tape     There is no immunization history for the selected administration types on file for this patient      Patient Care Team:  Katerin Rivera DO as PCP - General  MD Gabriel Leonard MD Devonne Buba, DO      Medicare Screening Tests and Risk Assessments:  AWV Clinical     ISAR:       Once in a Lifetime Medicare Screening:   AAA screening performed? (if performed, please add date to Health Maintenance):  No       Medicare Screening Tests and Risk Assessment:   AAA Risk Assessment     Family history of AAA:  No   Osteoporosis Risk Assessment    HIV Risk Assessment        Drug and Alcohol Use:   Tobacco use    Tobacco use duration    Tobacco Cessation Readiness    Alcohol use    Alcohol Treatment Readiness   Illicit Drug Use        Diet & Exercise:   Diet   How many servings a day of the following:   Exercise        Cognitive Impairment Screening:   Cognitive Impairment Screening        Functional Ability/Level of Safety:   Hearing    Hearing Impairment Assessment    Current Activities    Help needed with the folllowing:    ADL    Fall Risk   Injury History       Home Safety:   Home Safety Risk Factors       Advanced Directives:   Advanced Directives    Living Will:  Yes    Advanced directive:  Yes    Patient's End of Life Decisions        Urinary Incontinence:       Glaucoma:            Provider Screening     Preventative Screening/Counseling:   Cardiovascular Screening/Counseling:   (Labs Q5 years, EKG optional one-time)   General:  Screening Current Counseling:  Healthy Diet, Healthy Weight, Improve Exercise Tolerance          Diabetes Screening/Counseling:   (2 tests/year if Pre-Diabetes or 1 test/year if no Diabetes)   General:  Screening Current Counseling:  Healthy Diet, Healthy Weight          Colorectal Cancer Screening/Counseling:   (FOBT Q1 yr; Flex Sig Q4 yrs or Q10 yrs after Screening Colonoscopy; Screening Colonoscpy Q2 yrs High Risk or Q10 yrs Low Risk; Barium Enema Q2 yrs High Risk or Q4 yrs Low Risk)   General:  Screening Not Indicated Counseling:  high fiber diet          Prostate Cancer Screening/Counseling:   (Annual)          Breast Cancer Screening/Counseling:   (Baseline Age 28 - 43; Annual Age 36+)   General:  Screening Current          Cervical Cancer Screening/Counseling:   (Annual for High Risk or Childbearing Age with Abnormal Pap in Last 3 yrs; Every 2 all others)   General:  Screening Not Indicated           Osteoporosis Screening/Counseling:   (Every 2 Yrs if at risk or more if medically necessary)   General:  Patient Declines Counseling:  Calcium and Vitamin D Intake, Regular Weightbearing Exercise          AAA Screening/Counseling:   (Once per Lifetime with risk factors)    Family History of AAA:  No     General:  Screening Not Indicated           Glaucoma Screening/Counseling:   (Annual)   General:  Screening Current          HIV Screening/Counseling:   (Voluntary;  Once annually for high risk OR 3 times for Pregnancy at diagnosis of IUP; 3rd trimester; and at Labor   General:  Screening Not Indicated           Hepatitis C Screening:             Immunizations:   Influenza (annual):  Patient Declines   Pneumococcal (Once in a Lifetime):  Patient Declines   Hepatitis B Series (low risk patients):  Series Not Indicated   Zostavax (Medicare D Coverage, Pt >64 yo):  Risks & Benefits Discussed   TD (Non-Medicare Wellness  Visit required):  Vaccine Status Unknown   Tdap (Non-Medicare Wellness Visit required):  Vaccine Status Unknown       Other Preventative Couseling (Non-Medicare Wellness Visit Required):   sunscreen education provided, Increased physical activity counseling given       Referrals (Non-Medicare Wellness Visit Required):       Medical Equipment/Suppliers:           No exam data present  Reviewed Updated [facility] Prior Wellness Visits:   Last Medicare wellness visit information was reviewed, patient interviewed , no change since last AWVyes  Last Medicare wellness visit information was reviewed, patient interviewed and updates made to the record today yes    Assessment and Plan:  1   Medicare annual wellness visit, subsequent     Rx fbw  Pt deferred flu, pneumovax, shingrix  Encouraged exercise  Has appt with breast surgeon in ealry summer and will do mammo prior  Rto 4 months

## 2018-05-01 NOTE — PATIENT INSTRUCTIONS

## 2018-06-07 ENCOUNTER — APPOINTMENT (OUTPATIENT)
Dept: LAB | Facility: CLINIC | Age: 83
End: 2018-06-07
Payer: COMMERCIAL

## 2018-06-07 DIAGNOSIS — K21.9 GASTROESOPHAGEAL REFLUX DISEASE WITHOUT ESOPHAGITIS: ICD-10-CM

## 2018-06-07 DIAGNOSIS — E78.2 MIXED HYPERLIPIDEMIA: ICD-10-CM

## 2018-06-07 LAB
ALBUMIN SERPL BCP-MCNC: 3.8 G/DL (ref 3.5–5)
ALP SERPL-CCNC: 113 U/L (ref 46–116)
ALT SERPL W P-5'-P-CCNC: 19 U/L (ref 12–78)
ANION GAP SERPL CALCULATED.3IONS-SCNC: 8 MMOL/L (ref 4–13)
AST SERPL W P-5'-P-CCNC: 16 U/L (ref 5–45)
BASOPHILS # BLD AUTO: 0.04 THOUSANDS/ΜL (ref 0–0.1)
BASOPHILS NFR BLD AUTO: 1 % (ref 0–1)
BILIRUB SERPL-MCNC: 0.44 MG/DL (ref 0.2–1)
BUN SERPL-MCNC: 22 MG/DL (ref 5–25)
CALCIUM SERPL-MCNC: 8.8 MG/DL (ref 8.3–10.1)
CHLORIDE SERPL-SCNC: 101 MMOL/L (ref 100–108)
CHOLEST SERPL-MCNC: 232 MG/DL (ref 50–200)
CO2 SERPL-SCNC: 26 MMOL/L (ref 21–32)
CREAT SERPL-MCNC: 0.97 MG/DL (ref 0.6–1.3)
EOSINOPHIL # BLD AUTO: 0.12 THOUSAND/ΜL (ref 0–0.61)
EOSINOPHIL NFR BLD AUTO: 2 % (ref 0–6)
ERYTHROCYTE [DISTWIDTH] IN BLOOD BY AUTOMATED COUNT: 12.2 % (ref 11.6–15.1)
GFR SERPL CREATININE-BSD FRML MDRD: 55 ML/MIN/1.73SQ M
GLUCOSE P FAST SERPL-MCNC: 91 MG/DL (ref 65–99)
HCT VFR BLD AUTO: 39.3 % (ref 34.8–46.1)
HDLC SERPL-MCNC: 55 MG/DL (ref 40–60)
HGB BLD-MCNC: 12.5 G/DL (ref 11.5–15.4)
IMM GRANULOCYTES # BLD AUTO: 0.01 THOUSAND/UL (ref 0–0.2)
IMM GRANULOCYTES NFR BLD AUTO: 0 % (ref 0–2)
LDLC SERPL CALC-MCNC: 153 MG/DL (ref 0–100)
LYMPHOCYTES # BLD AUTO: 1.89 THOUSANDS/ΜL (ref 0.6–4.47)
LYMPHOCYTES NFR BLD AUTO: 31 % (ref 14–44)
MCH RBC QN AUTO: 30.5 PG (ref 26.8–34.3)
MCHC RBC AUTO-ENTMCNC: 31.8 G/DL (ref 31.4–37.4)
MCV RBC AUTO: 96 FL (ref 82–98)
MONOCYTES # BLD AUTO: 0.75 THOUSAND/ΜL (ref 0.17–1.22)
MONOCYTES NFR BLD AUTO: 12 % (ref 4–12)
NEUTROPHILS # BLD AUTO: 3.24 THOUSANDS/ΜL (ref 1.85–7.62)
NEUTS SEG NFR BLD AUTO: 54 % (ref 43–75)
NONHDLC SERPL-MCNC: 177 MG/DL
NRBC BLD AUTO-RTO: 0 /100 WBCS
PLATELET # BLD AUTO: 275 THOUSANDS/UL (ref 149–390)
PMV BLD AUTO: 10.3 FL (ref 8.9–12.7)
POTASSIUM SERPL-SCNC: 4.2 MMOL/L (ref 3.5–5.3)
PROT SERPL-MCNC: 7.6 G/DL (ref 6.4–8.2)
RBC # BLD AUTO: 4.1 MILLION/UL (ref 3.81–5.12)
SODIUM SERPL-SCNC: 135 MMOL/L (ref 136–145)
TRIGL SERPL-MCNC: 118 MG/DL
WBC # BLD AUTO: 6.05 THOUSAND/UL (ref 4.31–10.16)

## 2018-06-07 PROCEDURE — 80053 COMPREHEN METABOLIC PANEL: CPT

## 2018-06-07 PROCEDURE — 85025 COMPLETE CBC W/AUTO DIFF WBC: CPT

## 2018-06-07 PROCEDURE — 80061 LIPID PANEL: CPT

## 2018-06-07 PROCEDURE — 36415 COLL VENOUS BLD VENIPUNCTURE: CPT

## 2018-06-22 ENCOUNTER — TRANSCRIBE ORDERS (OUTPATIENT)
Dept: ADMINISTRATIVE | Facility: HOSPITAL | Age: 83
End: 2018-06-22

## 2018-06-22 DIAGNOSIS — Z12.39 SCREENING BREAST EXAMINATION: Primary | ICD-10-CM

## 2018-07-03 ENCOUNTER — HOSPITAL ENCOUNTER (OUTPATIENT)
Dept: MAMMOGRAPHY | Facility: HOSPITAL | Age: 83
Discharge: HOME | End: 2018-07-03
Payer: COMMERCIAL

## 2018-07-03 DIAGNOSIS — Z12.39 SCREENING BREAST EXAMINATION: ICD-10-CM

## 2018-07-03 DIAGNOSIS — C50.919 MALIGNANT NEOPLASM OF BREAST (FEMALE) (HCC): ICD-10-CM

## 2018-07-03 PROCEDURE — 77067 SCR MAMMO BI INCL CAD: CPT

## 2018-07-03 PROCEDURE — 77063 BREAST TOMOSYNTHESIS BI: CPT

## 2018-08-28 ENCOUNTER — OFFICE VISIT (OUTPATIENT)
Dept: INTERNAL MEDICINE CLINIC | Facility: CLINIC | Age: 83
End: 2018-08-28
Payer: COMMERCIAL

## 2018-08-28 VITALS
HEART RATE: 90 BPM | HEIGHT: 58 IN | TEMPERATURE: 97.6 F | BODY MASS INDEX: 22.56 KG/M2 | OXYGEN SATURATION: 98 % | WEIGHT: 107.5 LBS

## 2018-08-28 DIAGNOSIS — K64.8 HEMORRHOIDS, COMPLICATED: Primary | ICD-10-CM

## 2018-08-28 PROCEDURE — 99213 OFFICE O/P EST LOW 20 MIN: CPT | Performed by: INTERNAL MEDICINE

## 2018-08-28 PROCEDURE — 1160F RVW MEDS BY RX/DR IN RCRD: CPT | Performed by: INTERNAL MEDICINE

## 2018-08-28 NOTE — PROGRESS NOTES
Assessment/Plan:      External hemorrhoids  Referral to colorectal group for evaluation  HI fiber diet  increase water intake and can continue stool softener  Call made from here to setup appt  Rx for anusol cream apply bid to rectal area externally         Patient ID: Royal Mullen is a [de-identified] y o  female  HPI   Pt feels a lump that is painful and pressure in rectal area  No bleeding  She has had to some degree for awhile but Saturday after eating more than normal out with family she felt that something popped out  She has not had BM since then No blood but has discomfort and feels a lump that is tender  She has been taking colace  She think she has hemorrhoids  Review of Systems   Constitutional: Negative  HENT: Negative  Eyes: Negative  Respiratory: Negative  Cardiovascular: Negative  Gastrointestinal: Positive for rectal pain  Endocrine: Negative  Genitourinary: Negative  Musculoskeletal: Negative  Skin: Negative  Allergic/Immunologic: Negative  Neurological: Negative  Hematological: Negative  Psychiatric/Behavioral: Negative  Past Medical History:   Diagnosis Date    Cellulitis of left upper arm     History of anxiety     History of appendicitis     History of breast cancer     History of cancer     breast    History of gastroesophageal reflux (GERD)      Past Surgical History:   Procedure Laterality Date    APPENDECTOMY      Date Unknown    CATARACT EXTRACTION Bilateral     Left 2008 // Right 2008     SECTION      Date Unknown    MASTECTOMY Right 2007    PARTIAL HYSTERECTOMY      fallopian tube removed, ? side    TUBAL LIGATION      Date Unknown     Allergies   Allergen Reactions    Other Itching     Adhesive Tape     Social History     Social History    Marital status:      Spouse name: N/A    Number of children: N/A    Years of education: N/A     Occupational History    Not on file       Social History Main Topics    Smoking status: Never Smoker    Smokeless tobacco: Never Used    Alcohol use No    Drug use: No    Sexual activity: Not on file     Other Topics Concern    Not on file     Social History Narrative    Caffeine Use    Dental Care, Regularly    Good Dental Hygiene    Uses Safety Equipment: Seatbelts                Physical Exam   Constitutional: She is oriented to person, place, and time  She appears well-developed and well-nourished  No distress  HENT:   Head: Normocephalic and atraumatic  Eyes: Conjunctivae and EOM are normal  Pupils are equal, round, and reactive to light  No scleral icterus  Neck: Normal range of motion  Neck supple  Cardiovascular: Normal rate and regular rhythm  Pulmonary/Chest: Effort normal and breath sounds normal    Abdominal: Soft  Bowel sounds are normal  She exhibits no distension and no mass  There is no tenderness  There is no rebound and no guarding  Genitourinary:   Genitourinary Comments: Visible tender engorged probable hemorrhoid on the left outer wall of rectal opening  No bleeding or ulceration and it is not really discolored   Musculoskeletal: She exhibits no edema  Neurological: She is alert and oriented to person, place, and time  She has normal reflexes  Skin: Skin is warm and dry  She is not diaphoretic  Psychiatric: She has a normal mood and affect  Her behavior is normal  Judgment and thought content normal    Nursing note and vitals reviewed

## 2018-08-28 NOTE — PATIENT INSTRUCTIONS
Hemorrhoids   AMBULATORY CARE:   Hemorrhoids  are swollen blood vessels inside your rectum (internal hemorrhoids) or on your anus (external hemorrhoids)  Sometimes a hemorrhoid may prolapse  This means it extends out of your anus  Common symptoms include the following:   · Pain or itching around your anus or inside your rectum    · Swelling or bumps around your anus    · Bright red blood in your bowel movement, on the toilet paper, or in the toilet bowl    · Tissue bulging out of your anus (prolapsed hemorrhoids)    · Incontinence (poor control over urine or bowel movements)  Seek care immediately if:   · You have severe pain in your rectum or around your anus  · You have severe pain in your abdomen and you are vomiting  · You have bleeding from your anus that soaks through your underwear  Contact your healthcare provider if:   · You have frequent and painful bowel movements  · Your hemorrhoid looks or feels more swollen than usual      · You do not have a bowel movement for 2 days or more  · You see or feel tissue coming through your anus  · You have questions or concerns about your condition or care  Treatment for hemorrhoids  may include medicines to decrease pain, swelling, and itching  The medicine may be a pill, pad, cream, or ointment  Medicine may also be given to soften your bowel movement  Surgery and other procedures may be needed to shrink or remove your hemorrhoids  Manage your symptoms:   · Apply ice on your anus for 15 to 20 minutes every hour or as directed  Use an ice pack, or put crushed ice in a plastic bag  Cover it with a towel before you apply it to your anus  Ice helps prevent tissue damage and decreases swelling and pain  · Take a sitz bath  Fill a bathtub with 4 to 6 inches of warm water  You may also use a sitz bath pan that fits inside a toilet bowl  Sit in the sitz bath for 15 minutes  Do this 3 times a day, and after each bowel movement   The warm water can help decrease pain and swelling  · Keep your anal area clean  Gently wash the area with warm water daily  Soap may irritate the area  After a bowel movement, wipe with moist towelettes or wet toilet paper  Dry toilet paper can irritate the area  Prevent hemorrhoids:   · Do not strain to have a bowel movement  Do not sit on the toilet too long  These actions can increase pressure on the tissues in your rectum and anus  · Drink plenty of liquids  Liquids can help prevent constipation  Ask how much liquid to drink each day and which liquids are best for you  · Eat a variety of high-fiber foods  Examples include fruits, vegetables, and whole grains  Ask your healthcare provider how much fiber you need each day  You may need to take a fiber supplement  · Exercise as directed  Exercise, such as walking, may make it easier to have a bowel movement  Ask your healthcare provider to help you create an exercise plan  · Do not have anal sex  Anal sex can weaken the skin around your rectum and anus  · Avoid heavy lifting  This can cause straining and increase your risk for another hemorrhoid  Follow up with your healthcare provider as directed:  Write down your questions so you remember to ask them during your visits  © 2017 [facility] Information is for End User's use only and may not be sold, redistributed or otherwise used for commercial purposes  All illustrations and images included in CareNotes® are the copyrighted property of [employer]  or [employer]  The above information is an  only  It is not intended as medical advice for individual conditions or treatments  Talk to your doctor, nurse or pharmacist before following any medical regimen to see if it is safe and effective for you

## 2018-09-10 ENCOUNTER — OFFICE VISIT (OUTPATIENT)
Dept: INTERNAL MEDICINE CLINIC | Facility: CLINIC | Age: 83
End: 2018-09-10
Payer: COMMERCIAL

## 2018-09-10 VITALS
BODY MASS INDEX: 22.36 KG/M2 | DIASTOLIC BLOOD PRESSURE: 70 MMHG | WEIGHT: 106.5 LBS | TEMPERATURE: 97.4 F | HEART RATE: 63 BPM | OXYGEN SATURATION: 98 % | SYSTOLIC BLOOD PRESSURE: 124 MMHG | HEIGHT: 58 IN

## 2018-09-10 DIAGNOSIS — K44.9 HIATAL HERNIA: ICD-10-CM

## 2018-09-10 DIAGNOSIS — R05.9 COUGH: Primary | ICD-10-CM

## 2018-09-10 DIAGNOSIS — K21.9 GASTROESOPHAGEAL REFLUX DISEASE, ESOPHAGITIS PRESENCE NOT SPECIFIED: ICD-10-CM

## 2018-09-10 PROCEDURE — 1036F TOBACCO NON-USER: CPT | Performed by: INTERNAL MEDICINE

## 2018-09-10 PROCEDURE — 99214 OFFICE O/P EST MOD 30 MIN: CPT | Performed by: INTERNAL MEDICINE

## 2018-09-10 RX ORDER — PROMETHAZINE HYDROCHLORIDE AND CODEINE PHOSPHATE 6.25; 1 MG/5ML; MG/5ML
5 SYRUP ORAL EVERY 4 HOURS PRN
Qty: 120 ML | Refills: 0 | Status: SHIPPED | OUTPATIENT
Start: 2018-09-10 | End: 2019-02-22 | Stop reason: SDUPTHER

## 2018-09-10 NOTE — PROGRESS NOTES
Assessment/Plan:         Diagnoses and all orders for this visit:    Cough  Loratidine daily suspect allergic component    Gastroesophageal reflux disease, esophagitis presence not specified  TUMS daily GERD diet    Hiatal hernia  Pt aware part of cough may be from this so will eat small meals and trial tums      Pt has colorectal appt end of September    Rto 3 months     Patient ID: Efrain Smith is a 82 y o  female  HPI   Patient doing better with her rectal elizabeth - takes colace bid and bowels better  No n/v  No bleeding  Has appt with [facility] end of the month  Cough has resurfaced  Has some acid reflux  Non productive cough  No sob or wheeze  Review of Systems   Constitutional: Negative  HENT: Positive for postnasal drip  Eyes: Negative  Respiratory: Negative  Cardiovascular: Negative  Gastrointestinal: Positive for rectal pain  Endocrine: Negative  Genitourinary: Negative  Musculoskeletal: Negative  Skin: Positive for rash  Faint rash on forehead   Allergic/Immunologic: Negative  Neurological: Negative for headaches  Hematological: Negative  Psychiatric/Behavioral: Negative        Past Medical History:   Diagnosis Date    Cellulitis of left upper arm     History of anxiety     History of appendicitis     History of breast cancer     History of cancer     breast    History of gastroesophageal reflux (GERD)      Past Surgical History:   Procedure Laterality Date    APPENDECTOMY      Date Unknown    CATARACT EXTRACTION Bilateral     Left 2008 // Right 2008     SECTION      Date Unknown    MASTECTOMY Right 2007    PARTIAL HYSTERECTOMY      fallopian tube removed, ? side    TUBAL LIGATION      Date Unknown      Allergies   Allergen Reactions    Other Itching     Adhesive Tape       /70   Pulse 63   Temp (!) 97 4 °F (36 3 °C) (Tympanic)   Ht 4' 10" (1 473 m)   Wt 48 3 kg (106 lb 8 oz)   SpO2 98%   BMI 22 26 kg/m² Physical Exam   Constitutional: She is oriented to person, place, and time  She appears well-developed and well-nourished  No distress  HENT:   Head: Normocephalic and atraumatic  Right Ear: External ear normal    Left Ear: External ear normal    Nose: Nose normal    Mouth/Throat: Oropharynx is clear and moist  No oropharyngeal exudate  Eyes: Conjunctivae and EOM are normal  Pupils are equal, round, and reactive to light  No scleral icterus  Neck: Normal range of motion  Neck supple  No JVD present  Cardiovascular: Normal rate and regular rhythm  Pulmonary/Chest: Effort normal and breath sounds normal    Abdominal: Soft  Bowel sounds are normal    Musculoskeletal: Normal range of motion  She exhibits no edema  Lymphadenopathy:     She has no cervical adenopathy  Neurological: She is alert and oriented to person, place, and time  She has normal reflexes  No cranial nerve deficit  Coordination normal    Skin: Skin is warm and dry  Rash noted  She is not diaphoretic  Psychiatric: She has a normal mood and affect  Her behavior is normal  Judgment and thought content normal    Nursing note and vitals reviewed

## 2018-09-24 ENCOUNTER — OFFICE VISIT (OUTPATIENT)
Dept: SURGERY | Facility: HOSPITAL | Age: 83
End: 2018-09-24
Payer: COMMERCIAL

## 2018-09-24 VITALS — HEIGHT: 57 IN | BODY MASS INDEX: 23.3 KG/M2 | WEIGHT: 108 LBS

## 2018-09-24 DIAGNOSIS — K64.8 HEMORRHOIDS, COMPLICATED: Primary | ICD-10-CM

## 2018-09-24 PROCEDURE — 99204 OFFICE O/P NEW MOD 45 MIN: CPT | Performed by: COLON & RECTAL SURGERY

## 2018-09-24 PROCEDURE — 46600 DIAGNOSTIC ANOSCOPY SPX: CPT | Performed by: COLON & RECTAL SURGERY

## 2018-09-24 NOTE — ASSESSMENT & PLAN NOTE
Patient presents for evaluation of anal mass  Exam is largely unremarkable other than skin tag  I suspect this was a thrombosed external hemorrhoid based on description and exam   No further care is required for this as no symptoms are ongoing  We discussed role of colonoscopy in an 82year old with a family history of colon cancer and she does not wish for colonoscopy at this time  She will return on an as needed basis

## 2018-09-24 NOTE — PROGRESS NOTES
Mars Rollins was seen today for hemorrhoids  Diagnoses and all orders for this visit:    Hemorrhoids, complicated       Hemorrhoids, complicated  Patient presents for evaluation of anal mass  Exam is largely unremarkable other than skin tag  I suspect this was a thrombosed external hemorrhoid based on description and exam   No further care is required for this as no symptoms are ongoing  We discussed role of colonoscopy in an 82year old with a family history of colon cancer and she does not wish for colonoscopy at this time  She will return on an as needed basis  HPI Ira Crump is here today for evaluation  of hemorrhoidal symptoms and change in bowels  She was referred by Dr Cordova Aid  She complains of an anal lump near the anus  She states her symptoms started about 1 month ago  She also states her bowel movements have changed  Her stool is hard and has difficulty evacuating  She is taking stool softeners daily  She has a family history of colon cancer(sister)  Her last colonoscopy was about 10 years ago with normal results      Past Medical History:   Diagnosis Date    Cellulitis of left upper arm     History of anxiety     History of appendicitis     History of breast cancer     History of cancer     breast    History of gastroesophageal reflux (GERD)      Past Surgical History:   Procedure Laterality Date    APPENDECTOMY      Date Unknown    CATARACT EXTRACTION Bilateral     Left 2008 // Right 2008     SECTION      Date Unknown    MASTECTOMY Right 2007    PARTIAL HYSTERECTOMY      fallopian tube removed, ? side    TUBAL LIGATION      Date Unknown       Current Outpatient Prescriptions:     bimatoprost (LUMIGAN) 0 01 % ophthalmic drops, Administer 1 drop to both eyes daily at bedtime, Disp: , Rfl:     promethazine-codeine (PHENERGAN WITH CODEINE) 6 25-10 mg/5 mL syrup, Take 5 mL by mouth every 4 (four) hours as needed for cough, Disp: 120 mL, Rfl: 0   albuterol (PROVENTIL HFA,VENTOLIN HFA) 90 mcg/act inhaler, Inhale 2 puffs every 4 (four) hours as needed for wheezing (Patient not taking: Reported on 9/24/2018 ), Disp: 1 Inhaler, Rfl: 0    hydrocortisone (ANUSOL-HC, PROCTOSOL HC,) 2 5 % rectal cream, Insert into the rectum 2 (two) times a day (Patient not taking: Reported on 9/24/2018 ), Disp: 30 g, Rfl: 0  Allergies as of 09/24/2018 - Reviewed 09/24/2018   Allergen Reaction Noted    Other Itching 01/25/2018     Review of Systems   Gastrointestinal: Negative for rectal pain  There were no vitals filed for this visit  Physical Exam   Constitutional: She appears well-developed and well-nourished  HENT:   Head: Normocephalic and atraumatic  Eyes: EOM are normal  Pupils are equal, round, and reactive to light  Pulmonary/Chest: Effort normal    Genitourinary:   Genitourinary Comments: Small perianal skin tag  No gross lesions on external/ internal exam   Formed stool noted in rectum  Lower Endoscopy  Date/Time: 9/24/2018 11:11 AM  Performed by: Shahriar Leal  Authorized by: Shahriar Leal     Verbal consent obtained?: Yes    Consent given by:  Patient  Indications: hemorrhoids    Patient sedated: No    Scope type:   Anoscope  External exam performed: Yes    Perianal skin tags: Yes    Digital exam performed: Yes    Laxity of anal sphincter: No    Internal hemorrhoids: Yes    Prolapsed: No    Intraluminal mass: No    Inflammation: No    Anal stricture: No    Procedure termination:  Procedure complete  Patient tolerance:  Patient tolerated the procedure well with no immediate complications   Minimal internal hemorrhoidal disease noted

## 2018-09-24 NOTE — LETTER
September 24, 2018     Angelita Rogers DO  [facility]    Patient: Dieudonne Larsen   YOB: 1935   Date of Visit: 9/24/2018       Dear Dr Partha Cm: Thank you for referring Allen Rivas to me for evaluation  Below are my notes for this consultation  If you have questions, please do not hesitate to call me  I look forward to following your patient along with you  Sincerely,        Vida Rodas MD        CC: No Recipients  Vida Rodas MD  9/24/2018 11:12 AM  Sign at close encounter  Danyelle Keith was seen today for hemorrhoids  Diagnoses and all orders for this visit:    Hemorrhoids, complicated       Hemorrhoids, complicated  Patient presents for evaluation of anal mass  Exam is largely unremarkable other than skin tag  I suspect this was a thrombosed external hemorrhoid based on description and exam   No further care is required for this as no symptoms are ongoing  We discussed role of colonoscopy in an 82year old with a family history of colon cancer and she does not wish for colonoscopy at this time  She will return on an as needed basis  HPI Dieudonne Larsen is here today for evaluation  of hemorrhoidal symptoms and change in bowels  She was referred by Dr Partha Cm  She complains of an anal lump near the anus  She states her symptoms started about 1 month ago  She also states her bowel movements have changed  Her stool is hard and has difficulty evacuating  She is taking stool softeners daily  She has a family history of colon cancer(sister)  Her last colonoscopy was about 10 years ago with normal results      Past Medical History:   Diagnosis Date    Cellulitis of left upper arm     History of anxiety     History of appendicitis     History of breast cancer     History of cancer     breast    History of gastroesophageal reflux (GERD)      Past Surgical History:   Procedure Laterality Date    APPENDECTOMY Date Unknown    CATARACT EXTRACTION Bilateral     Left 2008 // Right 2008     SECTION      Date Unknown    MASTECTOMY Right 2007    PARTIAL HYSTERECTOMY      fallopian tube removed, ? side    TUBAL LIGATION      Date Unknown       Current Outpatient Prescriptions:     bimatoprost (LUMIGAN) 0 01 % ophthalmic drops, Administer 1 drop to both eyes daily at bedtime, Disp: , Rfl:     promethazine-codeine (PHENERGAN WITH CODEINE) 6 25-10 mg/5 mL syrup, Take 5 mL by mouth every 4 (four) hours as needed for cough, Disp: 120 mL, Rfl: 0    albuterol (PROVENTIL HFA,VENTOLIN HFA) 90 mcg/act inhaler, Inhale 2 puffs every 4 (four) hours as needed for wheezing (Patient not taking: Reported on 2018 ), Disp: 1 Inhaler, Rfl: 0    hydrocortisone (ANUSOL-HC, PROCTOSOL HC,) 2 5 % rectal cream, Insert into the rectum 2 (two) times a day (Patient not taking: Reported on 2018 ), Disp: 30 g, Rfl: 0  Allergies as of 2018 - Reviewed 2018   Allergen Reaction Noted    Other Itching 2018     Review of Systems   Gastrointestinal: Negative for rectal pain  There were no vitals filed for this visit  Physical Exam   Constitutional: She appears well-developed and well-nourished  HENT:   Head: Normocephalic and atraumatic  Eyes: EOM are normal  Pupils are equal, round, and reactive to light  Pulmonary/Chest: Effort normal    Genitourinary:   Genitourinary Comments: Small perianal skin tag  No gross lesions on external/ internal exam   Formed stool noted in rectum  Lower Endoscopy  Date/Time: 2018 11:11 AM  Performed by: Tanner Rod  Authorized by: Tanner Rod     Verbal consent obtained?: Yes    Consent given by:  Patient  Indications: hemorrhoids    Patient sedated: No    Scope type:   Anoscope  External exam performed: Yes    Perianal skin tags: Yes    Digital exam performed: Yes    Laxity of anal sphincter: No    Internal hemorrhoids: Yes    Prolapsed: No    Intraluminal mass: No    Inflammation: No    Anal stricture: No    Procedure termination:  Procedure complete  Patient tolerance:  Patient tolerated the procedure well with no immediate complications   Minimal internal hemorrhoidal disease noted

## 2018-12-05 ENCOUNTER — OFFICE VISIT (OUTPATIENT)
Dept: INTERNAL MEDICINE CLINIC | Facility: CLINIC | Age: 83
End: 2018-12-05
Payer: COMMERCIAL

## 2018-12-05 VITALS
SYSTOLIC BLOOD PRESSURE: 124 MMHG | WEIGHT: 109 LBS | HEIGHT: 57 IN | HEART RATE: 73 BPM | TEMPERATURE: 97.1 F | DIASTOLIC BLOOD PRESSURE: 70 MMHG | BODY MASS INDEX: 23.51 KG/M2 | OXYGEN SATURATION: 99 %

## 2018-12-05 DIAGNOSIS — R05.8 RECURRENT COUGH: Primary | ICD-10-CM

## 2018-12-05 DIAGNOSIS — E78.2 MIXED HYPERLIPIDEMIA: ICD-10-CM

## 2018-12-05 DIAGNOSIS — C50.911 MALIGNANT NEOPLASM OF RIGHT FEMALE BREAST, UNSPECIFIED ESTROGEN RECEPTOR STATUS, UNSPECIFIED SITE OF BREAST (HCC): ICD-10-CM

## 2018-12-05 DIAGNOSIS — K44.9 HIATAL HERNIA: ICD-10-CM

## 2018-12-05 DIAGNOSIS — K64.9 HEMORRHOIDS, UNSPECIFIED HEMORRHOID TYPE: ICD-10-CM

## 2018-12-05 DIAGNOSIS — M81.0 POST-MENOPAUSAL OSTEOPOROSIS: ICD-10-CM

## 2018-12-05 NOTE — PROGRESS NOTES
Assessment/Plan:         Diagnoses and all orders for this visit:    Hiatal hernia  ? Cause of recurrent cough GERD diet and tums encouraged    Post-menopausal osteoporosis  DEXA printed and patient to schedule    Malignant neoplasm of right female breast, unspecified estrogen receptor status, unspecified site of breast [facility])  Sees surgeon annually stable, no recurrence    Mixed hyperlipidemia  Last labs from June reviewed       Hemorrhoids, unspecified hemorrhoid type  Saw Dr Broderick Cho and no intervention warranted   She deferred colonoscopy    Rto 6 months       Patient ID: Fausto Birmingham is a 82 y o  female  HPI   Pt doing ok overall Still has dry cough at times and unclear if related to hiatal hernia  No chest pain appetite ok  She did see Dr Broderick Cho - no sxs from hemorrhoids  Bowels are regular  Saw Dr riggs in summer, no recurrence of breast cancer        Review of Systems   Constitutional: Negative  HENT: Negative  Eyes: Negative  Respiratory: Negative  Cardiovascular: Negative  Gastrointestinal: Negative  Negative for rectal pain  Endocrine: Negative  Genitourinary: Negative  Musculoskeletal: Negative  Skin: Negative  Allergic/Immunologic: Negative  Neurological: Negative  Hematological: Negative  Psychiatric/Behavioral: Negative  /70   Pulse 73   Temp (!) 97 1 °F (36 2 °C) (Tympanic)   Ht 4' 9" (1 448 m)   Wt 49 4 kg (109 lb)   SpO2 99%   BMI 23 59 kg/m²          Physical Exam   Constitutional: She is oriented to person, place, and time  She appears well-developed and well-nourished  HENT:   Head: Normocephalic and atraumatic  Right Ear: External ear normal    Left Ear: External ear normal    Nose: Nose normal    Mouth/Throat: Oropharyngeal exudate present  Eyes: Pupils are equal, round, and reactive to light  Conjunctivae and EOM are normal    Neck: Normal range of motion  Neck supple  No JVD present     Cardiovascular: Normal rate, regular rhythm, normal heart sounds and intact distal pulses  Pulmonary/Chest: Effort normal and breath sounds normal  No respiratory distress  She has no wheezes  She has no rales  Abdominal: Soft  Bowel sounds are normal    Musculoskeletal: Normal range of motion  Lymphadenopathy:     She has no cervical adenopathy  Neurological: She is alert and oriented to person, place, and time  She has normal reflexes  No cranial nerve deficit  Skin: Skin is warm and dry  Psychiatric: She has a normal mood and affect  Her behavior is normal  Judgment and thought content normal    Nursing note and vitals reviewed

## 2019-01-02 ENCOUNTER — HOSPITAL ENCOUNTER (OUTPATIENT)
Dept: BONE DENSITY | Facility: HOSPITAL | Age: 84
Discharge: HOME | End: 2019-01-02
Attending: INTERNAL MEDICINE
Payer: COMMERCIAL

## 2019-01-02 DIAGNOSIS — M81.0 AGE-RELATED OSTEOPOROSIS WITHOUT CURRENT PATHOLOGICAL FRACTURE: ICD-10-CM

## 2019-01-02 PROCEDURE — 77080 DXA BONE DENSITY AXIAL: CPT

## 2019-02-21 DIAGNOSIS — J06.9 UPPER RESPIRATORY TRACT INFECTION, UNSPECIFIED TYPE: Primary | ICD-10-CM

## 2019-02-21 RX ORDER — AZITHROMYCIN 250 MG/1
TABLET, FILM COATED ORAL
Qty: 6 TABLET | Refills: 0 | Status: SHIPPED | OUTPATIENT
Start: 2019-02-21 | End: 2019-02-25

## 2019-02-21 NOTE — TELEPHONE ENCOUNTER
Gloria states that the pt is c/o bad cough, chest congestion, chills, and is pale, that began today  NKDA  Pls advise

## 2019-02-22 DIAGNOSIS — R05.9 COUGH: ICD-10-CM

## 2019-02-22 RX ORDER — PROMETHAZINE HYDROCHLORIDE AND CODEINE PHOSPHATE 6.25; 1 MG/5ML; MG/5ML
5 SYRUP ORAL EVERY 4 HOURS PRN
Qty: 118 ML | Refills: 0 | Status: SHIPPED | OUTPATIENT
Start: 2019-02-22 | End: 2019-06-10 | Stop reason: SDUPTHER

## 2019-02-27 DIAGNOSIS — J01.81 OTHER ACUTE RECURRENT SINUSITIS: Primary | ICD-10-CM

## 2019-02-27 RX ORDER — AMOXICILLIN 500 MG/1
500 CAPSULE ORAL EVERY 8 HOURS SCHEDULED
Qty: 21 CAPSULE | Refills: 0 | Status: SHIPPED | OUTPATIENT
Start: 2019-02-27 | End: 2019-03-06

## 2019-03-21 DIAGNOSIS — F32.9 REACTIVE DEPRESSION: Primary | ICD-10-CM

## 2019-03-21 RX ORDER — CITALOPRAM 10 MG/1
10 TABLET ORAL DAILY
Qty: 30 TABLET | Refills: 5 | Status: SHIPPED | OUTPATIENT
Start: 2019-03-21 | End: 2020-06-12 | Stop reason: SDUPTHER

## 2019-06-07 ENCOUNTER — TRANSCRIBE ORDERS (OUTPATIENT)
Dept: ADMINISTRATIVE | Facility: HOSPITAL | Age: 84
End: 2019-06-07

## 2019-06-07 DIAGNOSIS — Z12.39 BREAST SCREENING, UNSPECIFIED: Primary | ICD-10-CM

## 2019-06-10 ENCOUNTER — OFFICE VISIT (OUTPATIENT)
Dept: INTERNAL MEDICINE CLINIC | Facility: CLINIC | Age: 84
End: 2019-06-10
Payer: COMMERCIAL

## 2019-06-10 ENCOUNTER — APPOINTMENT (OUTPATIENT)
Dept: LAB | Facility: MEDICAL CENTER | Age: 84
End: 2019-06-10
Payer: COMMERCIAL

## 2019-06-10 VITALS
DIASTOLIC BLOOD PRESSURE: 64 MMHG | TEMPERATURE: 95.2 F | SYSTOLIC BLOOD PRESSURE: 118 MMHG | BODY MASS INDEX: 23.33 KG/M2 | OXYGEN SATURATION: 95 % | HEART RATE: 72 BPM | WEIGHT: 108.13 LBS | HEIGHT: 57 IN

## 2019-06-10 DIAGNOSIS — E55.9 VITAMIN D DEFICIENCY: ICD-10-CM

## 2019-06-10 DIAGNOSIS — Z00.00 MEDICARE ANNUAL WELLNESS VISIT, SUBSEQUENT: ICD-10-CM

## 2019-06-10 DIAGNOSIS — Z00.00 MEDICARE ANNUAL WELLNESS VISIT, SUBSEQUENT: Primary | ICD-10-CM

## 2019-06-10 DIAGNOSIS — R05.9 COUGH: ICD-10-CM

## 2019-06-10 LAB
25(OH)D3 SERPL-MCNC: 26.2 NG/ML (ref 30–100)
ALBUMIN SERPL BCP-MCNC: 3.9 G/DL (ref 3.5–5)
ALP SERPL-CCNC: 129 U/L (ref 46–116)
ALT SERPL W P-5'-P-CCNC: 21 U/L (ref 12–78)
ANION GAP SERPL CALCULATED.3IONS-SCNC: 5 MMOL/L (ref 4–13)
AST SERPL W P-5'-P-CCNC: 18 U/L (ref 5–45)
BILIRUB SERPL-MCNC: 0.43 MG/DL (ref 0.2–1)
BUN SERPL-MCNC: 15 MG/DL (ref 5–25)
CALCIUM SERPL-MCNC: 9.4 MG/DL (ref 8.3–10.1)
CHLORIDE SERPL-SCNC: 106 MMOL/L (ref 100–108)
CHOLEST SERPL-MCNC: 259 MG/DL (ref 50–200)
CO2 SERPL-SCNC: 26 MMOL/L (ref 21–32)
CREAT SERPL-MCNC: 1.04 MG/DL (ref 0.6–1.3)
GFR SERPL CREATININE-BSD FRML MDRD: 51 ML/MIN/1.73SQ M
GLUCOSE P FAST SERPL-MCNC: 94 MG/DL (ref 65–99)
HDLC SERPL-MCNC: 57 MG/DL (ref 40–60)
LDLC SERPL CALC-MCNC: 181 MG/DL (ref 0–100)
NONHDLC SERPL-MCNC: 202 MG/DL
POTASSIUM SERPL-SCNC: 4.5 MMOL/L (ref 3.5–5.3)
PROT SERPL-MCNC: 7.9 G/DL (ref 6.4–8.2)
SODIUM SERPL-SCNC: 137 MMOL/L (ref 136–145)
TRIGL SERPL-MCNC: 107 MG/DL

## 2019-06-10 PROCEDURE — 82306 VITAMIN D 25 HYDROXY: CPT

## 2019-06-10 PROCEDURE — 80061 LIPID PANEL: CPT

## 2019-06-10 PROCEDURE — 1160F RVW MEDS BY RX/DR IN RCRD: CPT | Performed by: INTERNAL MEDICINE

## 2019-06-10 PROCEDURE — 1170F FXNL STATUS ASSESSED: CPT | Performed by: INTERNAL MEDICINE

## 2019-06-10 PROCEDURE — 80053 COMPREHEN METABOLIC PANEL: CPT

## 2019-06-10 PROCEDURE — 36415 COLL VENOUS BLD VENIPUNCTURE: CPT

## 2019-06-10 PROCEDURE — G0439 PPPS, SUBSEQ VISIT: HCPCS | Performed by: INTERNAL MEDICINE

## 2019-06-10 PROCEDURE — 1125F AMNT PAIN NOTED PAIN PRSNT: CPT | Performed by: INTERNAL MEDICINE

## 2019-06-10 RX ORDER — PROMETHAZINE HYDROCHLORIDE AND CODEINE PHOSPHATE 6.25; 1 MG/5ML; MG/5ML
5 SYRUP ORAL EVERY 4 HOURS PRN
Qty: 118 ML | Refills: 0 | Status: SHIPPED | OUTPATIENT
Start: 2019-06-10 | End: 2020-07-28 | Stop reason: ALTCHOICE

## 2019-06-11 PROBLEM — Z53.20 REFUSAL OF STATIN MEDICATION BY PATIENT: Status: ACTIVE | Noted: 2019-06-11

## 2019-07-05 ENCOUNTER — HOSPITAL ENCOUNTER (OUTPATIENT)
Dept: MAMMOGRAPHY | Facility: HOSPITAL | Age: 84
Discharge: HOME | End: 2019-07-05
Payer: COMMERCIAL

## 2019-07-05 VITALS — BODY MASS INDEX: 23.3 KG/M2 | HEIGHT: 57 IN | WEIGHT: 108 LBS

## 2019-07-05 DIAGNOSIS — Z12.39 BREAST SCREENING, UNSPECIFIED: ICD-10-CM

## 2019-07-05 PROCEDURE — 77067 SCR MAMMO BI INCL CAD: CPT

## 2019-07-05 PROCEDURE — 77063 BREAST TOMOSYNTHESIS BI: CPT

## 2019-07-22 ENCOUNTER — APPOINTMENT (EMERGENCY)
Dept: CT IMAGING | Facility: HOSPITAL | Age: 84
End: 2019-07-22
Payer: COMMERCIAL

## 2019-07-22 ENCOUNTER — HOSPITAL ENCOUNTER (EMERGENCY)
Facility: HOSPITAL | Age: 84
Discharge: HOME | End: 2019-07-22
Attending: EMERGENCY MEDICINE | Admitting: EMERGENCY MEDICINE
Payer: COMMERCIAL

## 2019-07-22 VITALS
TEMPERATURE: 98.8 F | SYSTOLIC BLOOD PRESSURE: 179 MMHG | HEIGHT: 57 IN | DIASTOLIC BLOOD PRESSURE: 70 MMHG | OXYGEN SATURATION: 95 % | WEIGHT: 108.47 LBS | HEART RATE: 94 BPM | RESPIRATION RATE: 20 BRPM | BODY MASS INDEX: 23.4 KG/M2

## 2019-07-22 DIAGNOSIS — R19.7 DIARRHEA: Primary | ICD-10-CM

## 2019-07-22 DIAGNOSIS — N39.0 UTI (URINARY TRACT INFECTION): ICD-10-CM

## 2019-07-22 DIAGNOSIS — K52.9 COLITIS: ICD-10-CM

## 2019-07-22 DIAGNOSIS — R19.7 DIARRHEA, UNSPECIFIED TYPE: Primary | ICD-10-CM

## 2019-07-22 LAB
ALBUMIN SERPL BCP-MCNC: 3.4 G/DL (ref 3.5–5)
ALP SERPL-CCNC: 114 U/L (ref 46–116)
ALT SERPL W P-5'-P-CCNC: 20 U/L (ref 12–78)
ANION GAP SERPL CALCULATED.3IONS-SCNC: 9 MMOL/L (ref 4–13)
APTT PPP: 28 SECONDS (ref 23–37)
AST SERPL W P-5'-P-CCNC: 17 U/L (ref 5–45)
ATRIAL RATE: 90 BPM
BACTERIA UR QL AUTO: ABNORMAL /HPF
BASOPHILS # BLD AUTO: 0.03 THOUSANDS/ΜL (ref 0–0.1)
BASOPHILS NFR BLD AUTO: 0 % (ref 0–1)
BILIRUB SERPL-MCNC: 0.6 MG/DL (ref 0.2–1)
BILIRUB UR QL STRIP: NEGATIVE
BUN SERPL-MCNC: 13 MG/DL (ref 5–25)
CALCIUM SERPL-MCNC: 8.8 MG/DL (ref 8.3–10.1)
CHLORIDE SERPL-SCNC: 98 MMOL/L (ref 100–108)
CLARITY UR: CLEAR
CO2 SERPL-SCNC: 28 MMOL/L (ref 21–32)
COLOR UR: YELLOW
CREAT SERPL-MCNC: 1.11 MG/DL (ref 0.6–1.3)
EOSINOPHIL # BLD AUTO: 0 THOUSAND/ΜL (ref 0–0.61)
EOSINOPHIL NFR BLD AUTO: 0 % (ref 0–6)
ERYTHROCYTE [DISTWIDTH] IN BLOOD BY AUTOMATED COUNT: 12 % (ref 11.6–15.1)
GFR SERPL CREATININE-BSD FRML MDRD: 47 ML/MIN/1.73SQ M
GLUCOSE SERPL-MCNC: 134 MG/DL (ref 65–140)
GLUCOSE UR STRIP-MCNC: NEGATIVE MG/DL
HCT VFR BLD AUTO: 36 % (ref 34.8–46.1)
HGB BLD-MCNC: 12 G/DL (ref 11.5–15.4)
HGB UR QL STRIP.AUTO: NEGATIVE
IMM GRANULOCYTES # BLD AUTO: 0.08 THOUSAND/UL (ref 0–0.2)
IMM GRANULOCYTES NFR BLD AUTO: 1 % (ref 0–2)
INR PPP: 1.07 (ref 0.84–1.19)
KETONES UR STRIP-MCNC: ABNORMAL MG/DL
LACTATE SERPL-SCNC: 1.2 MMOL/L (ref 0.5–2)
LEUKOCYTE ESTERASE UR QL STRIP: ABNORMAL
LIPASE SERPL-CCNC: 80 U/L (ref 73–393)
LYMPHOCYTES # BLD AUTO: 0.59 THOUSANDS/ΜL (ref 0.6–4.47)
LYMPHOCYTES NFR BLD AUTO: 4 % (ref 14–44)
MCH RBC QN AUTO: 31.7 PG (ref 26.8–34.3)
MCHC RBC AUTO-ENTMCNC: 33.3 G/DL (ref 31.4–37.4)
MCV RBC AUTO: 95 FL (ref 82–98)
MONOCYTES # BLD AUTO: 1.47 THOUSAND/ΜL (ref 0.17–1.22)
MONOCYTES NFR BLD AUTO: 10 % (ref 4–12)
NEUTROPHILS # BLD AUTO: 13.31 THOUSANDS/ΜL (ref 1.85–7.62)
NEUTS SEG NFR BLD AUTO: 85 % (ref 43–75)
NITRITE UR QL STRIP: NEGATIVE
NON-SQ EPI CELLS URNS QL MICRO: ABNORMAL /HPF
NRBC BLD AUTO-RTO: 0 /100 WBCS
P AXIS: 64 DEGREES
PH UR STRIP.AUTO: 5.5 [PH]
PLATELET # BLD AUTO: 222 THOUSANDS/UL (ref 149–390)
PMV BLD AUTO: 9.9 FL (ref 8.9–12.7)
POTASSIUM SERPL-SCNC: 4 MMOL/L (ref 3.5–5.3)
PR INTERVAL: 138 MS
PROT SERPL-MCNC: 7.3 G/DL (ref 6.4–8.2)
PROT UR STRIP-MCNC: NEGATIVE MG/DL
PROTHROMBIN TIME: 13.9 SECONDS (ref 11.6–14.5)
QRS AXIS: 62 DEGREES
QRSD INTERVAL: 82 MS
QT INTERVAL: 350 MS
QTC INTERVAL: 428 MS
RBC # BLD AUTO: 3.79 MILLION/UL (ref 3.81–5.12)
RBC #/AREA URNS AUTO: ABNORMAL /HPF
SODIUM SERPL-SCNC: 135 MMOL/L (ref 136–145)
SP GR UR STRIP.AUTO: <=1.005 (ref 1–1.03)
T WAVE AXIS: 56 DEGREES
TROPONIN I SERPL-MCNC: <0.02 NG/ML
UROBILINOGEN UR QL STRIP.AUTO: 0.2 E.U./DL
VENTRICULAR RATE: 90 BPM
WBC # BLD AUTO: 15.48 THOUSAND/UL (ref 4.31–10.16)
WBC #/AREA URNS AUTO: ABNORMAL /HPF

## 2019-07-22 PROCEDURE — 99284 EMERGENCY DEPT VISIT MOD MDM: CPT

## 2019-07-22 PROCEDURE — 81001 URINALYSIS AUTO W/SCOPE: CPT | Performed by: EMERGENCY MEDICINE

## 2019-07-22 PROCEDURE — 83690 ASSAY OF LIPASE: CPT | Performed by: EMERGENCY MEDICINE

## 2019-07-22 PROCEDURE — 99285 EMERGENCY DEPT VISIT HI MDM: CPT | Performed by: EMERGENCY MEDICINE

## 2019-07-22 PROCEDURE — 83605 ASSAY OF LACTIC ACID: CPT | Performed by: EMERGENCY MEDICINE

## 2019-07-22 PROCEDURE — 85610 PROTHROMBIN TIME: CPT | Performed by: EMERGENCY MEDICINE

## 2019-07-22 PROCEDURE — 96376 TX/PRO/DX INJ SAME DRUG ADON: CPT

## 2019-07-22 PROCEDURE — 96375 TX/PRO/DX INJ NEW DRUG ADDON: CPT

## 2019-07-22 PROCEDURE — 84484 ASSAY OF TROPONIN QUANT: CPT | Performed by: EMERGENCY MEDICINE

## 2019-07-22 PROCEDURE — 74177 CT ABD & PELVIS W/CONTRAST: CPT

## 2019-07-22 PROCEDURE — 85730 THROMBOPLASTIN TIME PARTIAL: CPT | Performed by: EMERGENCY MEDICINE

## 2019-07-22 PROCEDURE — 96374 THER/PROPH/DIAG INJ IV PUSH: CPT

## 2019-07-22 PROCEDURE — 80053 COMPREHEN METABOLIC PANEL: CPT | Performed by: EMERGENCY MEDICINE

## 2019-07-22 PROCEDURE — 96361 HYDRATE IV INFUSION ADD-ON: CPT

## 2019-07-22 PROCEDURE — 93005 ELECTROCARDIOGRAM TRACING: CPT

## 2019-07-22 PROCEDURE — 85025 COMPLETE CBC W/AUTO DIFF WBC: CPT | Performed by: EMERGENCY MEDICINE

## 2019-07-22 PROCEDURE — 93010 ELECTROCARDIOGRAM REPORT: CPT | Performed by: INTERNAL MEDICINE

## 2019-07-22 RX ORDER — CIPROFLOXACIN 500 MG/1
500 TABLET, FILM COATED ORAL ONCE
Status: DISCONTINUED | OUTPATIENT
Start: 2019-07-22 | End: 2019-07-22

## 2019-07-22 RX ORDER — CIPROFLOXACIN 250 MG/1
250 TABLET, FILM COATED ORAL ONCE
Status: COMPLETED | OUTPATIENT
Start: 2019-07-22 | End: 2019-07-22

## 2019-07-22 RX ORDER — ONDANSETRON 2 MG/ML
4 INJECTION INTRAMUSCULAR; INTRAVENOUS ONCE
Status: COMPLETED | OUTPATIENT
Start: 2019-07-22 | End: 2019-07-22

## 2019-07-22 RX ORDER — CHOLESTYRAMINE 4 G/9G
1 POWDER, FOR SUSPENSION ORAL 2 TIMES DAILY WITH MEALS
Qty: 60 PACKET | Refills: 0 | Status: SHIPPED | OUTPATIENT
Start: 2019-07-22 | End: 2019-11-08 | Stop reason: ALTCHOICE

## 2019-07-22 RX ORDER — CIPROFLOXACIN 250 MG/1
250 TABLET, FILM COATED ORAL 2 TIMES DAILY
Qty: 13 TABLET | Refills: 0 | Status: SHIPPED | OUTPATIENT
Start: 2019-07-22 | End: 2019-07-29

## 2019-07-22 RX ADMIN — CIPROFLOXACIN HYDROCHLORIDE 250 MG: 250 TABLET, FILM COATED ORAL at 17:36

## 2019-07-22 RX ADMIN — IOHEXOL 100 ML: 350 INJECTION, SOLUTION INTRAVENOUS at 16:10

## 2019-07-22 RX ADMIN — SODIUM CHLORIDE 1000 ML: 0.9 INJECTION, SOLUTION INTRAVENOUS at 15:24

## 2019-07-22 RX ADMIN — ONDANSETRON HYDROCHLORIDE 4 MG: 2 SOLUTION INTRAMUSCULAR; INTRAVENOUS at 17:36

## 2019-07-22 RX ADMIN — ONDANSETRON 4 MG: 2 INJECTION INTRAMUSCULAR; INTRAVENOUS at 15:23

## 2019-07-22 RX ADMIN — MORPHINE SULFATE 2 MG: 2 INJECTION, SOLUTION INTRAMUSCULAR; INTRAVENOUS at 17:37

## 2019-07-22 NOTE — CASE MANAGEMENT
This ED navigator interviewed the patient to see if there was any resources that might be available to her, she denied needing any resources at this time  ED Navigator care management screenin  Who do you live with?  self  2  What kind of house? How many steps into house/upstairs? House, one flight of steps  3  Any medical equipment? W/C, walker, O2?  no  4  Where do you get O2 from?  n/a  5  Home health care? If so who?  n/a  6  Meals on wheels?  n/a  7  Who helps with ADL's?  self  8  What pharmacy do you use? [facility]  9  Do you have issues getting your meds?  no  10  Do you drive or who drives you?  yes  11  Insurance? [employer]  12  Did you try to get an appointment with your PCP today? Yes called PCP today was told to come to ED

## 2019-07-22 NOTE — TELEPHONE ENCOUNTER
If unable to tolerate po and sxs persist should go to the er  Can rx questran one packet up to bid for the diarrhea

## 2019-07-22 NOTE — DISCHARGE INSTRUCTIONS
Please follow-up with Gastroenterology as directed  He did not wish to be admitted today  Please return immediately with abdominal pain diarrhea or other concerns

## 2019-07-22 NOTE — ED NOTES
Patient being transported to [address] via [address] [facility], RN  07/22/19 [id number]

## 2019-07-22 NOTE — ED PROCEDURE NOTE
PROCEDURE  ECG 12 Lead Documentation Only  Date/Time: 7/22/2019 3:21 PM  Performed by: Keyonna Blankenship DO  Authorized by: Keyonna Blankenship DO     Indications / Diagnosis:  Abdominal pain  ECG reviewed by me, the ED Provider: yes    Patient location:  ED  Previous ECG:     Previous ECG:  Compared to current    Comparison ECG info:  Nonspecific changes have now improved when compared to EKG from November 2014  Interpretation:     Interpretation: non-specific    Rate:     ECG rate:  90n     ECG rate assessment: normal    Rhythm:     Rhythm: sinus rhythm    ST segments:     ST segments:  Non-specific         Keyonna Blankenship DO  07/22/19 1522

## 2019-07-22 NOTE — ED PROVIDER NOTES
History  Chief Complaint   Patient presents with    Diarrhea     with ABD pain started last night     29-year-old female presents complaining of bilateral lower quadrant abdominal pain and diarrhea which began last night at 1900 hours  She states she has had nausea without any vomiting  She states she has had loose stools throughout the course the evening  She denies blood in stool  She states the stools are mucous laden  Abdominal Pain   Pain location:  LLQ and RLQ  Pain quality: aching and cramping    Pain radiates to:  LLQ and RLQ  Pain severity:  Mild  Onset quality:  Gradual  Duration:  19 hours  Timing:  Intermittent  Progression:  Waxing and waning  Chronicity:  New  Context: not alcohol use, not awakening from sleep, not diet changes, not eating and not laxative use    Relieved by:  Nothing  Worsened by:  Nothing  Ineffective treatments:  None tried  Associated symptoms: anorexia and diarrhea    Associated symptoms: no dysuria    Risk factors: being elderly    Risk factors: no alcohol abuse and no aspirin use        Prior to Admission Medications   Prescriptions Last Dose Informant Patient Reported? Taking?    albuterol (PROVENTIL HFA,VENTOLIN HFA) 90 mcg/act inhaler Not Taking at Unknown time  No No   Sig: Inhale 2 puffs every 4 (four) hours as needed for wheezing   Patient not taking: Reported on 6/10/2019   bimatoprost (LUMIGAN) 0 01 % ophthalmic drops   Yes Yes   Sig: Administer 1 drop to both eyes daily at bedtime   cholestyramine (QUESTRAN) 4 g packet Not Taking at Unknown time  No No   Sig: Take 1 packet (4 g total) by mouth 2 (two) times a day with meals   Patient not taking: Reported on 7/22/2019   citalopram (CeleXA) 10 mg tablet   No Yes   Sig: Take 1 tablet (10 mg total) by mouth daily for 30 days   hydrocortisone (ANUSOL-HC, PROCTOSOL HC,) 2 5 % rectal cream Not Taking at Unknown time  No No   Sig: Insert into the rectum 2 (two) times a day   Patient not taking: Reported on 6/10/2019 promethazine-codeine (PHENERGAN WITH CODEINE) 6 25-10 mg/5 mL syrup   No Yes   Sig: Take 5 mL by mouth every 4 (four) hours as needed for cough      Facility-Administered Medications: None       Past Medical History:   Diagnosis Date    Breast cancer ([facility] [address] ) 2007    Cellulitis of left upper arm     History of anxiety     History of appendicitis     History of gastroesophageal reflux (GERD)        Past Surgical History:   Procedure Laterality Date    APPENDECTOMY      Date Unknown    CATARACT EXTRACTION Bilateral     Left 2008 // Right 2008     SECTION      Date Unknown    HYSTERECTOMY      MASTECTOMY Right 2007    PARTIAL HYSTERECTOMY      fallopian tube removed, ? side    TUBAL LIGATION      Date Unknown       Family History   Problem Relation Age of Onset    Lung cancer Mother     Colon cancer Sister     Hypertension Family     No Known Problems Father     No Known Problems Daughter     No Known Problems Maternal Grandmother     No Known Problems Maternal Grandfather     No Known Problems Paternal Grandmother     No Known Problems Paternal Grandfather     No Known Problems Sister     Drug abuse Neg Hx      I have reviewed and agree with the history as documented  Social History     Tobacco Use    Smoking status: Never Smoker    Smokeless tobacco: Never Used   Substance Use Topics    Alcohol use: No    Drug use: No        Review of Systems   Constitutional: Negative  HENT: Negative  Eyes: Negative  Respiratory: Negative  Cardiovascular: Negative  Gastrointestinal: Positive for abdominal pain, anorexia and diarrhea  Endocrine: Negative  Genitourinary: Negative for dysuria  Musculoskeletal: Negative  Skin: Negative  Allergic/Immunologic: Negative  Neurological: Negative  Hematological: Negative  Psychiatric/Behavioral: Negative  All other systems reviewed and are negative        Physical Exam  Physical Exam Constitutional: She appears well-developed and well-nourished  HENT:   Head: Normocephalic  Right Ear: External ear normal    Left Ear: External ear normal    Eyes: Pupils are equal, round, and reactive to light  Right eye exhibits no discharge  Left eye exhibits no discharge  Neck: Normal range of motion  No tracheal deviation present  No thyromegaly present  Cardiovascular: Normal rate, regular rhythm and normal heart sounds  Pulmonary/Chest: Effort normal  No stridor  No respiratory distress  She has no wheezes  Abdominal: Bowel sounds are increased  Musculoskeletal: She exhibits no edema or deformity  Neurological: She is alert  She displays normal reflexes  No cranial nerve deficit  Coordination normal    Skin: Skin is warm  Capillary refill takes less than 2 seconds  No rash noted  No erythema  Psychiatric: She has a normal mood and affect  Her behavior is normal  Thought content normal    Vitals reviewed        Vital Signs  ED Triage Vitals [07/22/19 1451]   Temperature Pulse Respirations Blood Pressure SpO2   98 8 °F (37 1 °C) 93 20 (!) 180/73 96 %      Temp Source Heart Rate Source Patient Position - Orthostatic VS BP Location FiO2 (%)   Temporal Monitor Lying Right arm --      Pain Score       9           Vitals:    07/22/19 1451 07/22/19 1500 07/22/19 1530 07/22/19 1615   BP: (!) 180/73 165/68 156/66 (!) 179/70   Pulse: 93 91 83 94   Patient Position - Orthostatic VS: Lying Lying Lying Lying         Visual Acuity      ED Medications  Medications   ondansetron (ZOFRAN) injection 4 mg (has no administration in time range)   morphine injection 2 mg (has no administration in time range)   ciprofloxacin (CIPRO) tablet 250 mg (has no administration in time range)   sodium chloride 0 9 % bolus 1,000 mL (1,000 mL Intravenous New Bag 7/22/19 1524)   ondansetron (ZOFRAN) injection 4 mg (4 mg Intravenous Given 7/22/19 1523)   iohexol (OMNIPAQUE) 350 MG/ML injection (MULTI-DOSE) 100 mL (100 mL Intravenous Given 7/22/19 1610)       Diagnostic Studies  Results Reviewed     Procedure Component Value Units Date/Time    Urine Microscopic [[id number]]  (Abnormal) Collected:  07/22/19 1636    Lab Status:  Final result Specimen:  Urine, Clean Catch Updated:  07/22/19 1700     RBC, UA None Seen /hpf      WBC, UA 2-4 /hpf      Epithelial Cells Occasional /hpf      Bacteria, UA Occasional /hpf     Stool Enteric Bacterial Panel by PCR [[id number]]     Lab Status:  No result Specimen:  Stool from Rectum     Clostridium difficile toxin by PCR [[id number]]     Lab Status:  No result Specimen:  Stool from Per Rectum     UA w Reflex to Microscopic w Reflex to Culture [[id number]]  (Abnormal) Collected:  07/22/19 1636    Lab Status:  Final result Specimen:  Urine, Clean Catch Updated:  07/22/19 1650     Color, UA Yellow     Clarity, UA Clear     Specific Gravity, UA <=1 005     pH, UA 5 5     Leukocytes, UA Trace     Nitrite, UA Negative     Protein, UA Negative mg/dl      Glucose, UA Negative mg/dl      Ketones, UA Trace mg/dl      Urobilinogen, UA 0 2 E U /dl      Bilirubin, UA Negative     Blood, UA Negative    Lactic acid, plasma [[id number]]  (Normal) Collected:  07/22/19 1514    Lab Status:  Final result Specimen:  Blood from Arm, Left Updated:  07/22/19 1540     LACTIC ACID 1 2 mmol/L     Narrative:       Result may be elevated if tourniquet was used during collection      Troponin I [[id number]]  (Normal) Collected:  07/22/19 1514    Lab Status:  Final result Specimen:  Blood from Arm, Left Updated:  07/22/19 1539     Troponin I <0 02 ng/mL     Comprehensive metabolic panel [[id number]]  (Abnormal) Collected:  07/22/19 1514    Lab Status:  Final result Specimen:  Blood from Arm, Left Updated:  07/22/19 1537     Sodium 135 mmol/L      Potassium 4 0 mmol/L      Chloride 98 mmol/L      CO2 28 mmol/L      ANION GAP 9 mmol/L      BUN 13 mg/dL      Creatinine 1 11 mg/dL      Glucose 134 mg/dL      Calcium 8 8 mg/dL      AST 17 U/L      ALT 20 U/L      Alkaline Phosphatase 114 U/L      Total Protein 7 3 g/dL      Albumin 3 4 g/dL      Total Bilirubin 0 60 mg/dL      eGFR 47 ml/min/1 73sq m     Narrative:       [place] guidelines for Chronic Kidney Disease (CKD):     Stage 1 with normal or high GFR (GFR > 90 mL/min/1 73 square meters)    Stage 2 Mild CKD (GFR = 60-89 mL/min/1 73 square meters)    Stage 3A Moderate CKD (GFR = 45-59 mL/min/1 73 square meters)    Stage 3B Moderate CKD (GFR = 30-44 mL/min/1 73 square meters)    Stage 4 Severe CKD (GFR = 15-29 mL/min/1 73 square meters)    Stage 5 End Stage CKD (GFR <15 mL/min/1 73 square meters)  Note: GFR calculation is accurate only with a steady state creatinine    Lipase [[id number]]  (Normal) Collected:  07/22/19 1514    Lab Status:  Final result Specimen:  Blood from Arm, Left Updated:  07/22/19 1532     Lipase 80 u/L     Protime-INR [[id number]]  (Normal) Collected:  07/22/19 1514    Lab Status:  Final result Specimen:  Blood from Arm, Left Updated:  07/22/19 1529     Protime 13 9 seconds      INR 1 07    APTT [[id number]]  (Normal) Collected:  07/22/19 1514    Lab Status:  Final result Specimen:  Blood from Arm, Left Updated:  07/22/19 1529     PTT 28 seconds     CBC and differential [[id number]]  (Abnormal) Collected:  07/22/19 1514    Lab Status:  Final result Specimen:  Blood from Arm, Left Updated:  07/22/19 1520     WBC 15 48 Thousand/uL      RBC 3 79 Million/uL      Hemoglobin 12 0 g/dL      Hematocrit 36 0 %      MCV 95 fL      MCH 31 7 pg      MCHC 33 3 g/dL      RDW 12 0 %      MPV 9 9 fL      Platelets 502 Thousands/uL      nRBC 0 /100 WBCs      Neutrophils Relative 85 %      Immat GRANS % 1 %      Lymphocytes Relative 4 %      Monocytes Relative 10 %      Eosinophils Relative 0 %      Basophils Relative 0 %      Neutrophils Absolute 13 31 Thousands/µL      Immature Grans Absolute 0 08 Thousand/uL      Lymphocytes Absolute 0 59 Thousands/µL Monocytes Absolute 1 47 Thousand/µL      Eosinophils Absolute 0 00 Thousand/µL      Basophils Absolute 0 03 Thousands/µL                  CT abdomen pelvis with contrast   Final Result by Yamilet Sandoval MD (07/22 1619)      Acute proctocolitis, likely infectious  Workstation performed: [id number]                    Procedures  Procedures       ED Course         HEART Risk Score      Most Recent Value   History  1 Filed at: 07/22/2019 1521   ECG  1 Filed at: 07/22/2019 1521   Age  2 Filed at: 07/22/2019 1521   Risk Factors  1 Filed at: 07/22/2019 1521   Troponin  0 Filed at: 07/22/2019 1521   Heart Score Risk Calculator   History  1 Filed at: 07/22/2019 1521   ECG  1 Filed at: 07/22/2019 1521   Age  2 Filed at: 07/22/2019 1521   Risk Factors  1 Filed at: 07/22/2019 1521   Troponin  0 Filed at: 07/22/2019 1521   HEART Score  5 Filed at: 07/22/2019 1521   HEART Score  5 Filed at: 07/22/2019 1521                            MDM  Number of Diagnoses or Management Options  Colitis:   Diarrhea:   UTI (urinary tract infection):   Diagnosis management comments: Differential diagnosis 1  Colitis 2  Diverticulitis 3  Bowel obstruction 4  Enteritis  Give the patient IV fluids check CT scan the abdomen pelvis  1700 -   I spoke with Dr Sid Ramon of GI who states she would not put the patient prophylactically on antibiotics at this time  I will obtain stool cultures prior to the patient being discharged  Patient will follow up with Dr Sid Ramon   I spoke with Pharm about cipro dose     Patient would like to leave       Amount and/or Complexity of Data Reviewed  Clinical lab tests: ordered and reviewed  Tests in the radiology section of CPT®: ordered and reviewed  Tests in the medicine section of CPT®: reviewed and ordered    Risk of Complications, Morbidity, and/or Mortality  Presenting problems: high  Diagnostic procedures: high  Management options: high        Disposition  Final diagnoses:   Diarrhea   Colitis   UTI (urinary tract infection)     Time reflects when diagnosis was documented in both MDM as applicable and the Disposition within this note     Time User Action Codes Description Comment    7/22/2019  5:18 PM Zara Noland [R19 7] Diarrhea     7/22/2019  5:18 PM Zara Noland [K52 9] Colitis     7/22/2019  5:18 PM Kelly Green Add [N39 0] UTI (urinary tract infection)       ED Disposition     ED Disposition Condition Date/Time Comment    Discharge Stable Mon Jul 22, 2019  5:18 PM 47 Rocio Lowery discharge to home/self care  Follow-up Information     Follow up With Specialties Details Why Contact Info    Ivonne Patino MD Gastroenterology   [address] [place] [address]  [phone]            Patient's Medications   Discharge Prescriptions    CIPROFLOXACIN (CIPRO) 250 MG TABLET    Take 1 tablet (250 mg total) by mouth 2 (two) times a day for 7 days       Start Date: 7/22/2019 End Date: 7/29/2019       Order Dose: 250 mg       Quantity: 13 tablet    Refills: 0     No discharge procedures on file      ED Provider  Electronically Signed by           Aurelia Miranda DO  07/22/19 0279

## 2019-07-23 ENCOUNTER — TELEPHONE (OUTPATIENT)
Dept: GASTROENTEROLOGY | Facility: HOSPITAL | Age: 84
End: 2019-07-23

## 2019-07-23 DIAGNOSIS — K58.9 IRRITABLE BOWEL SYNDROME, UNSPECIFIED TYPE: Primary | ICD-10-CM

## 2019-07-23 RX ORDER — DICYCLOMINE HYDROCHLORIDE 10 MG/1
10 CAPSULE ORAL EVERY 8 HOURS
Qty: 30 CAPSULE | Refills: 0 | Status: SHIPPED | OUTPATIENT
Start: 2019-07-23 | End: 2020-12-10 | Stop reason: ALTCHOICE

## 2019-07-23 NOTE — TELEPHONE ENCOUNTER
----- Message from Jasmin Norris MD sent at 2019  8:35 AM EDT -----  This patient Kary Staton DOB 39 was in the ED yesterday afternoon  for diarrhea and Ct showed infection of the colon  ED discharged her to home and wanted us to see her in office  Can you fit her in this week? Please keep me updated  Thanks       Jasmin Norris

## 2019-07-24 ENCOUNTER — OFFICE VISIT (OUTPATIENT)
Dept: GASTROENTEROLOGY | Facility: HOSPITAL | Age: 84
End: 2019-07-24
Payer: COMMERCIAL

## 2019-07-24 VITALS
BODY MASS INDEX: 23.73 KG/M2 | WEIGHT: 110 LBS | TEMPERATURE: 98.8 F | SYSTOLIC BLOOD PRESSURE: 161 MMHG | HEIGHT: 57 IN | DIASTOLIC BLOOD PRESSURE: 68 MMHG | HEART RATE: 70 BPM

## 2019-07-24 DIAGNOSIS — K52.9 COLITIS: Primary | ICD-10-CM

## 2019-07-24 PROCEDURE — 99203 OFFICE O/P NEW LOW 30 MIN: CPT | Performed by: PHYSICIAN ASSISTANT

## 2019-07-24 NOTE — PROGRESS NOTES
[facility] - Outpatient Consultation  Miguel Alvarado 82 y o  female MRN: [id number]  Encounter: [id number]          ASSESSMENT AND PLAN:      1  Colitis:   Symptoms of diffuse abdominal pain and multiple episodes of diarrhea starting 7/21  She presented to the emergency room 7/22 and underwent CT of the abdomen and pelvis with contrast, which revealed acute proctocolitis involving the rectum and sigmoid colon likely infectious  She was given 7 days of ciprofloxacin and discharged home  She states that her symptoms are improving including abdominal pain and her diarrhea has resolved  She denies any melena or hematochezia at any time  Her last colonoscopy was greater than 10 years ago  We did discuss flexible sigmoidoscopy, which she politely declines at this time  We will see her back in 1 month and if her symptoms persist we will again recommend flexible sigmoidoscopy  Will check CBC to ensure her white count is improving  - CBC and differential  -complete 7 days of ciprofloxacin  -low fiber/residue diet at home  -f/u in 1 month  -if symptoms persist, recommend flex sig for further evaluation     ______________________________________________________________________    HPI:  Beth Dukes is an 82-year-old female with a past medical history of GERD, breast cancer, cellulitis who presented as a new patient from emergency room follow-up for colitis  She states that starting Sunday earlier this week she developed diffuse abdominal pain 10/10 prevented her from being able to walk  Was also experiencing diarrhea at that time  She presented to the emergency room where CT scan of the abdomen and pelvis was done with contrast   This revealed acute proctocolitis, likely infectious in the rectum and sigmoid colon  No stool studies were done at that time  She was given 7 days of ciprofloxacin and discharged    She states that her symptoms are improving, her pain is still present but has improved since leaving the emergency room  She is no longer experiencing diarrhea but states she is passing mucus  He denies any nausea, vomiting  She does have decreased appetite with this illness  She denies any melena or hematochezia  She states her last colonoscopy was greater than 10 years ago, which she thinks was normal  We did discuss a possible sigmoidoscopy which she politely declined at this time      REVIEW OF SYSTEMS:    CONSTITUTIONAL: Denies any fever, chills, rigors, and weight loss  HEENT: No earache or tinnitus  Denies hearing loss or visual disturbances  CARDIOVASCULAR: No chest pain or palpitations  RESPIRATORY: Denies any cough, hemoptysis, shortness of breath or dyspnea on exertion  GASTROINTESTINAL: As noted in the History of Present Illness  GENITOURINARY: No problems with urination  Denies any hematuria or dysuria  NEUROLOGIC: No dizziness or vertigo, denies headaches  MUSCULOSKELETAL: Denies any muscle or joint pain  SKIN: Denies skin rashes or itching  ENDOCRINE: Denies excessive thirst  Denies intolerance to heat or cold  PSYCHOSOCIAL: Denies depression or anxiety  Denies any recent memory loss         Historical Information   Past Medical History:   Diagnosis Date    Breast cancer ([facility] [address] ) 2007    Cellulitis of left upper arm     History of anxiety     History of appendicitis     History of gastroesophageal reflux (GERD)      Past Surgical History:   Procedure Laterality Date    APPENDECTOMY      Date Unknown    CATARACT EXTRACTION Bilateral     Left 2008 // Right 2008     SECTION      Date Unknown    HYSTERECTOMY      MASTECTOMY Right 2007    PARTIAL HYSTERECTOMY      fallopian tube removed, ? side    TUBAL LIGATION      Date Unknown     Social History   Social History     Substance and Sexual Activity   Alcohol Use No     Social History     Substance and Sexual Activity   Drug Use No     Social History     Tobacco Use   Smoking Status Never Smoker   Smokeless Tobacco Never Used     Family History   Problem Relation Age of Onset    Lung cancer Mother     Colon cancer Sister     Hypertension Family     No Known Problems Father     No Known Problems Daughter     No Known Problems Maternal Grandmother     No Known Problems Maternal Grandfather     No Known Problems Paternal Grandmother     No Known Problems Paternal Grandfather     No Known Problems Sister     Drug abuse Neg Hx        Meds/Allergies       Current Outpatient Medications:     bimatoprost (LUMIGAN) 0 01 % ophthalmic drops    ciprofloxacin (CIPRO) 250 mg tablet    promethazine-codeine (PHENERGAN WITH CODEINE) 6 25-10 mg/5 mL syrup    albuterol (PROVENTIL HFA,VENTOLIN HFA) 90 mcg/act inhaler    cholestyramine (QUESTRAN) 4 g packet    citalopram (CeleXA) 10 mg tablet    dicyclomine (BENTYL) 10 mg capsule    hydrocortisone (ANUSOL-HC, PROCTOSOL HC,) 2 5 % rectal cream    Allergies   Allergen Reactions    Other Itching     Adhesive Tape           Objective     Blood pressure 161/68, pulse 70, temperature 98 8 °F (37 1 °C), height 4' 9" (1 448 m), weight 49 9 kg (110 lb)  Body mass index is 23 8 kg/m²  PHYSICAL EXAM:      General Appearance:   Alert, cooperative, no distress   HEENT:   Normocephalic, atraumatic, anicteric  Right eye exhibits no discharge  Left eye exhibits no discharge  No scleral icterus          Neck:  Supple, symmetrical, trachea midline, no stridor   Lungs:   Clear to auscultation bilaterally; no rales, rhonchi or wheezing; respirations unlabored    Heart[de-identified]   Regular rate and rhythm; no murmur, rub, or gallop     Abdomen:   Soft, diffuse abdominal tenderness, worse in the right lower quadrant, non-distended; normal bowel sounds; no masses, no organomegaly    Genitalia:   Deferred    Rectal:   Deferred    Extremities:  No cyanosis, clubbing or edema        Skin:  No jaundice, rashes, or lesions    Lymph nodes:  No palpable cervical lymphadenopathy        Lab Results:   No visits with results within 1 Day(s) from this visit     Latest known visit with results is:   Admission on 07/22/2019, Discharged on 07/22/2019   Component Date Value    WBC 07/22/2019 15 48*    RBC 07/22/2019 3 79*    Hemoglobin 07/22/2019 12 0     Hematocrit 07/22/2019 36 0     MCV 07/22/2019 95     MCH 07/22/2019 31 7     MCHC 07/22/2019 33 3     RDW 07/22/2019 12 0     MPV 07/22/2019 9 9     Platelets 91/26/3102 222     nRBC 07/22/2019 0     Neutrophils Relative 07/22/2019 85*    Immat GRANS % 07/22/2019 1     Lymphocytes Relative 07/22/2019 4*    Monocytes Relative 07/22/2019 10     Eosinophils Relative 07/22/2019 0     Basophils Relative 07/22/2019 0     Neutrophils Absolute 07/22/2019 13 31*    Immature Grans Absolute 07/22/2019 0 08     Lymphocytes Absolute 07/22/2019 0 59*    Monocytes Absolute 07/22/2019 1 47*    Eosinophils Absolute 07/22/2019 0 00     Basophils Absolute 07/22/2019 0 03     Protime 07/22/2019 13 9     INR 07/22/2019 1 07     PTT 07/22/2019 28     Sodium 07/22/2019 135*    Potassium 07/22/2019 4 0     Chloride 07/22/2019 98*    CO2 07/22/2019 28     ANION GAP 07/22/2019 9     BUN 07/22/2019 13     Creatinine 07/22/2019 1 11     Glucose 07/22/2019 134     Calcium 07/22/2019 8 8     AST 07/22/2019 17     ALT 07/22/2019 20     Alkaline Phosphatase 07/22/2019 114     Total Protein 07/22/2019 7 3     Albumin 07/22/2019 3 4*    Total Bilirubin 07/22/2019 0 60     eGFR 07/22/2019 47     Lipase 07/22/2019 80     LACTIC ACID 07/22/2019 1 2     Troponin I 07/22/2019 <0 02     Color, UA 07/22/2019 Yellow     Clarity, UA 07/22/2019 Clear     Specific Gravity, UA 07/22/2019 <=1 005     pH, UA 07/22/2019 5 5     Leukocytes, UA 07/22/2019 Trace*    Nitrite, UA 07/22/2019 Negative     Protein, UA 07/22/2019 Negative     Glucose, UA 07/22/2019 Negative     Ketones, UA 07/22/2019 Trace*    Urobilinogen, UA 07/22/2019 0 2     Bilirubin, UA 07/22/2019 Negative     Blood, UA 07/22/2019 Negative     RBC, UA 07/22/2019 None Seen     WBC, UA 07/22/2019 2-4*    Epithelial Cells 07/22/2019 Occasional     Bacteria, UA 07/22/2019 Occasional     Ventricular Rate 07/22/2019 90     Atrial Rate 07/22/2019 90     AR Interval 07/22/2019 138     QRSD Interval 07/22/2019 82     QT Interval 07/22/2019 350     QTC Interval 07/22/2019 428     P Axis 07/22/2019 64     QRS Axis 07/22/2019 62     T Wave Axis 07/22/2019 56          Radiology Results:   Ct Abdomen Pelvis With Contrast    Result Date: 7/22/2019  Narrative: CT ABDOMEN AND PELVIS WITH IV CONTRAST INDICATION:   Abdominal pain with diarrhea  COMPARISON:  CT chest abdomen pelvis 12/13/2017  TECHNIQUE:  CT examination of the abdomen and pelvis was performed  Axial, sagittal, and coronal 2D reformatted images were created from the source data and submitted for interpretation  Radiation dose length product (DLP) for this visit:  413 28 mGy-cm   This examination, like all CT scans performed in the [facility], was performed utilizing techniques to minimize radiation dose exposure, including the use of iterative  reconstruction and automated exposure control  IV Contrast:  100 mL of iohexol (OMNIPAQUE) Enteric Contrast:  Enteric contrast was not administered  FINDINGS: ABDOMEN LOWER CHEST:  No clinically significant abnormality identified in the visualized lower chest  LIVER/BILIARY TREE:  Cyst and hypoattenuating foci that are too small to characterize but likely represent cysts are again seen  GALLBLADDER:  No calcified gallstones  No pericholecystic inflammatory change  SPLEEN:  Unremarkable  PANCREAS:  Unremarkable  ADRENAL GLANDS:  Unremarkable  KIDNEYS/URETERS:  Unremarkable  No hydronephrosis  STOMACH AND BOWEL:  Moderate size hiatal hernia is present   There is marked wall thickening involving the rectum and sigmoid colon with pericolonic stranding, consistent with colitis  There is no evidence of bowel obstruction  APPENDIX:  No findings to suggest appendicitis  ABDOMINOPELVIC CAVITY:  No ascites or free intraperitoneal air  No lymphadenopathy  VESSELS:  Unremarkable for patient's age  PELVIS REPRODUCTIVE ORGANS:  Unremarkable for patient's age  URINARY BLADDER:  Unremarkable  ABDOMINAL WALL/INGUINAL REGIONS:  Unremarkable  OSSEOUS STRUCTURES:  No acute fracture or destructive osseous lesion  Impression: Acute proctocolitis, likely infectious  Mammo Screening Left W 3d & Cad    Result Date: 7/5/2019  Narrative: DIAGNOSIS: Breast screening, unspecified RELEVANT HISTORY: Family Breast Cancer History: No known family history of breast cancer  Family Medical history: Family medical history includes colon cancer in sister  Personal History: No known relevant hormone history  Surgical history includes mastectomy and hysterectomy  Medical history includes breast cancer  COMPARISONS: Prior mammograms dated: 07/03/2018, 06/29/2017, 06/27/2016, 06/26/2015, and 06/25/2014 INDICATION: Alex Brochure is a 82 y o  female presenting for screening mammography  TECHNIQUE: The current study was evaluated with Computer Aided Detection  TISSUE DENSITY: There are scattered areas of fibroglandular density  The patient is scheduled in a reminder system for screening mammography  8-10% of cancers will be missed on mammography  Management of a palpable abnormality must be based on clinical grounds  Patients will be notified of their results via letter from our facility  Accredited by [address] [place] [address] of Radiology and FDA  RISK ASSESSMENT: 5 Year Tyrer-Cuzick: No Score 10 Year Tyrer-Cuzick: No Score Lifetime Tyrer-Cuzick: No Score FINDINGS: There are no suspicious masses, grouped microcalcifications or areas of architectural distortion  A stable nodular asymmetry is seen in the retroareolar left breast   The skin and nipple areolar complex are unremarkable  Impression: No mammographic evidence of malignancy   ASSESSMENT/BI-RADS CATEGORY: Left: 2 - Benign Overall: 2 - Benign RECOMMENDATION:      - Routine screening mammogram in 1 year for the left breast

## 2019-07-26 ENCOUNTER — OFFICE VISIT (OUTPATIENT)
Dept: INTERNAL MEDICINE CLINIC | Facility: CLINIC | Age: 84
End: 2019-07-26
Payer: COMMERCIAL

## 2019-07-26 ENCOUNTER — APPOINTMENT (OUTPATIENT)
Dept: LAB | Facility: MEDICAL CENTER | Age: 84
End: 2019-07-26
Payer: COMMERCIAL

## 2019-07-26 VITALS
SYSTOLIC BLOOD PRESSURE: 126 MMHG | BODY MASS INDEX: 23.76 KG/M2 | TEMPERATURE: 98.3 F | WEIGHT: 110.13 LBS | HEART RATE: 89 BPM | OXYGEN SATURATION: 80 % | DIASTOLIC BLOOD PRESSURE: 70 MMHG | HEIGHT: 57 IN

## 2019-07-26 DIAGNOSIS — R10.31 RIGHT LOWER QUADRANT ABDOMINAL PAIN: ICD-10-CM

## 2019-07-26 DIAGNOSIS — K52.9 COLITIS: Primary | ICD-10-CM

## 2019-07-26 PROBLEM — Z78.9: Status: RESOLVED | Noted: 2018-01-24 | Resolved: 2019-07-26

## 2019-07-26 PROBLEM — R05.9 COUGH: Status: RESOLVED | Noted: 2018-01-25 | Resolved: 2019-07-26

## 2019-07-26 LAB
BASOPHILS # BLD AUTO: 0.02 THOUSANDS/ΜL (ref 0–0.1)
BASOPHILS NFR BLD AUTO: 0 % (ref 0–1)
EOSINOPHIL # BLD AUTO: 0.08 THOUSAND/ΜL (ref 0–0.61)
EOSINOPHIL NFR BLD AUTO: 1 % (ref 0–6)
ERYTHROCYTE [DISTWIDTH] IN BLOOD BY AUTOMATED COUNT: 12.1 % (ref 11.6–15.1)
HCT VFR BLD AUTO: 36.7 % (ref 34.8–46.1)
HGB BLD-MCNC: 11.8 G/DL (ref 11.5–15.4)
IMM GRANULOCYTES # BLD AUTO: 0.03 THOUSAND/UL (ref 0–0.2)
IMM GRANULOCYTES NFR BLD AUTO: 1 % (ref 0–2)
LYMPHOCYTES # BLD AUTO: 0.62 THOUSANDS/ΜL (ref 0.6–4.47)
LYMPHOCYTES NFR BLD AUTO: 9 % (ref 14–44)
MCH RBC QN AUTO: 30.8 PG (ref 26.8–34.3)
MCHC RBC AUTO-ENTMCNC: 32.2 G/DL (ref 31.4–37.4)
MCV RBC AUTO: 96 FL (ref 82–98)
MONOCYTES # BLD AUTO: 0.65 THOUSAND/ΜL (ref 0.17–1.22)
MONOCYTES NFR BLD AUTO: 10 % (ref 4–12)
NEUTROPHILS # BLD AUTO: 5.21 THOUSANDS/ΜL (ref 1.85–7.62)
NEUTS SEG NFR BLD AUTO: 79 % (ref 43–75)
NRBC BLD AUTO-RTO: 0 /100 WBCS
PLATELET # BLD AUTO: 283 THOUSANDS/UL (ref 149–390)
PMV BLD AUTO: 10.8 FL (ref 8.9–12.7)
RBC # BLD AUTO: 3.83 MILLION/UL (ref 3.81–5.12)
WBC # BLD AUTO: 6.61 THOUSAND/UL (ref 4.31–10.16)

## 2019-07-26 PROCEDURE — 85025 COMPLETE CBC W/AUTO DIFF WBC: CPT | Performed by: PHYSICIAN ASSISTANT

## 2019-07-26 PROCEDURE — 99214 OFFICE O/P EST MOD 30 MIN: CPT | Performed by: INTERNAL MEDICINE

## 2019-07-26 PROCEDURE — 1160F RVW MEDS BY RX/DR IN RCRD: CPT | Performed by: INTERNAL MEDICINE

## 2019-07-26 PROCEDURE — 36415 COLL VENOUS BLD VENIPUNCTURE: CPT | Performed by: PHYSICIAN ASSISTANT

## 2019-07-26 PROCEDURE — 1036F TOBACCO NON-USER: CPT | Performed by: INTERNAL MEDICINE

## 2019-07-26 NOTE — PROGRESS NOTES
Assessment/Plan:         Diagnoses and all orders for this visit:    Colitis  Pt saw GI and deferred sigmoid She is finishing the Cipro and instructed her to start probiotic  She has GI followup and reiterated if sxs persist it would be advisable to have sigmoid done Pt has refused colonoscopy (when of screening age) and continues to   She can start small portions of bland foods and increase fluid intake  She will go for repeat CBC today    Right lower quadrant abdominal pain  If sxs worsen, fever develops or rectal bleeding return to the hospital       Patient ID: Jennifer Sigala is a 82 y o  female  HPI   Pt seen in ER for diarrhea and lower abdominal pain She was dx with colitis and started on Cipro CT in ER suggested colitis  Pt is on the Cipro She states now she is not moving her bowels She has not eaten She has lower abdominal pain but has not taken any medication for it No bleeding Her stools were mucousy  No fever or chills  She saw GI the other day and deferred sigmoid procedure She has followup in one month and was told to get repeat CBC    The following portions of the patient's history were reviewed and updated as appropriate:     Review of Systems   Constitutional: Positive for activity change and fatigue  Negative for fever  Respiratory: Negative  Cardiovascular: Negative  Gastrointestinal: Positive for abdominal pain and diarrhea  Genitourinary: Negative  Musculoskeletal: Positive for arthralgias  Skin: Negative  Neurological: Negative for dizziness and headaches  Psychiatric/Behavioral: Positive for sleep disturbance         Past Medical History:   Diagnosis Date    Breast cancer ([facility] [address] ) 07/19/2007    Cellulitis of left upper arm     History of anxiety     History of appendicitis     History of gastroesophageal reflux (GERD)      Past Surgical History:   Procedure Laterality Date    APPENDECTOMY      Date Unknown    CATARACT EXTRACTION Bilateral     Left 06/2008 // Right 2008     SECTION      Date Unknown    HYSTERECTOMY      MASTECTOMY Right 2007    PARTIAL HYSTERECTOMY      fallopian tube removed, ? side    TUBAL LIGATION      Date Unknown     Social History     Socioeconomic History    Marital status:      Spouse name: Not on file    Number of children: Not on file    Years of education: Not on file    Highest education level: Not on file   Occupational History    Not on file   Social Needs    Financial resource strain: Not on file    Food insecurity:     Worry: Not on file     Inability: Not on file    Transportation needs:     Medical: Not on file     Non-medical: Not on file   Tobacco Use    Smoking status: Never Smoker    Smokeless tobacco: Never Used   Substance and Sexual Activity    Alcohol use: No    Drug use: No    Sexual activity: Not on file   Lifestyle    Physical activity:     Days per week: Not on file     Minutes per session: Not on file    Stress: Not on file   Relationships    Social connections:     Talks on phone: Not on file     Gets together: Not on file     Attends Nondenominational service: Not on file     Active member of club or organization: Not on file     Attends meetings of clubs or organizations: Not on file     Relationship status: Not on file    Intimate partner violence:     Fear of current or ex partner: Not on file     Emotionally abused: Not on file     Physically abused: Not on file     Forced sexual activity: Not on file   Other Topics Concern    Not on file   Social History Narrative    Caffeine Use    Dental Care, Regularly    Good Dental Hygiene    Uses Safety Equipment: Seatbelts         Allergies   Allergen Reactions    Other Itching     Adhesive Tape           /70   Pulse 89   Temp 98 3 °F (36 8 °C) (Tympanic)   Ht 4' 9" (1 448 m)   Wt 50 kg (110 lb 2 oz)   SpO2 (!) 80%   BMI 23 83 kg/m²          Physical Exam   Constitutional: She is oriented to person, place, and time   She appears well-developed and well-nourished  No distress  HENT:   Head: Normocephalic and atraumatic  Mouth/Throat: No oropharyngeal exudate  Eyes: Pupils are equal, round, and reactive to light  Conjunctivae and EOM are normal  No scleral icterus  Neck: Normal range of motion  Neck supple  No JVD present  Cardiovascular: Normal rate and regular rhythm  Pulmonary/Chest: Effort normal and breath sounds normal    Abdominal: She exhibits distension  There is tenderness  Tenderness to palpation rlq  Abdomen is soft and minimally distended   Musculoskeletal: Normal range of motion  She exhibits no edema  Lymphadenopathy:     She has no cervical adenopathy  Neurological: She is alert and oriented to person, place, and time  No cranial nerve deficit  Skin: Skin is warm and dry  She is not diaphoretic  No erythema  Psychiatric: She has a normal mood and affect  Her behavior is normal  Judgment and thought content normal    Nursing note and vitals reviewed

## 2019-11-08 ENCOUNTER — OFFICE VISIT (OUTPATIENT)
Dept: INTERNAL MEDICINE CLINIC | Facility: CLINIC | Age: 84
End: 2019-11-08
Payer: COMMERCIAL

## 2019-11-08 VITALS
DIASTOLIC BLOOD PRESSURE: 72 MMHG | WEIGHT: 98 LBS | HEART RATE: 61 BPM | SYSTOLIC BLOOD PRESSURE: 122 MMHG | TEMPERATURE: 96.6 F | BODY MASS INDEX: 21.14 KG/M2 | HEIGHT: 57 IN | OXYGEN SATURATION: 89 %

## 2019-11-08 DIAGNOSIS — K44.9 HIATAL HERNIA: ICD-10-CM

## 2019-11-08 DIAGNOSIS — K52.9 COLITIS: Primary | ICD-10-CM

## 2019-11-08 DIAGNOSIS — F41.9 ANXIETY: ICD-10-CM

## 2019-11-08 PROBLEM — K64.9 HEMORRHOIDS: Status: RESOLVED | Noted: 2018-08-28 | Resolved: 2019-11-08

## 2019-11-08 PROCEDURE — 99214 OFFICE O/P EST MOD 30 MIN: CPT | Performed by: INTERNAL MEDICINE

## 2019-11-08 PROCEDURE — 1160F RVW MEDS BY RX/DR IN RCRD: CPT | Performed by: INTERNAL MEDICINE

## 2019-11-08 PROCEDURE — 1036F TOBACCO NON-USER: CPT | Performed by: INTERNAL MEDICINE

## 2019-11-08 NOTE — PROGRESS NOTES
Assessment/Plan:         Diagnoses and all orders for this visit:    Colitis  Pt feels well presently and is continuing probiotic  I encouraged and discussed options to increase protein in her diet for weight maintenance  Adequate hydration    Hiatal hernia  She denies sxs today and takes medication prn    Anxiety  Pt doing better now that GI sxs are more stable  Continue current regimen    Other orders  -     Probiotic Product (PROBIOTIC DAILY PO); Take by mouth  Rto 6months/prn         Patient ID: Tomi Olivares is a 82 y o  female  HPI   Pt doing much better GI sxs seem resolved her appetite still not great but bowels are normal now She saw GI out of the [facility] had sigmoid She is taking probiotic and bowels are more regular  No chest pain or sob   No dizziness No abdominal pain No n/v She does not eat much but is trying to do better  She was told followup prn with GI      Review of Systems   Constitutional: Negative  HENT: Negative  Respiratory: Negative  Cardiovascular: Negative  Gastrointestinal: Negative  Genitourinary: Negative  Musculoskeletal: Negative  Skin: Negative  Neurological: Negative  Hematological: Negative  Psychiatric/Behavioral: Negative  Past Medical History:   Diagnosis Date    [facility]    Cellulitis of left upper arm     History of anxiety     History of appendicitis     History of gastroesophageal reflux (GERD)      Past Surgical History:   Procedure Laterality Date    APPENDECTOMY      Date Unknown    CATARACT EXTRACTION Bilateral     Left 2008 // Right 2008     SECTION      Date Unknown    HYSTERECTOMY      MASTECTOMY Right 2007    PARTIAL HYSTERECTOMY      fallopian tube removed, ? side    TUBAL LIGATION      Date Unknown     Social History     Socioeconomic History    Marital status:       Spouse name: Not on file    Number of children: Not on file    Years of education: Not on file  Highest education level: Not on file   Occupational History    Not on file   Social Needs    Financial resource strain: Not on file    Food insecurity:     Worry: Not on file     Inability: Not on file    Transportation needs:     Medical: Not on file     Non-medical: Not on file   Tobacco Use    Smoking status: Never Smoker    Smokeless tobacco: Never Used   Substance and Sexual Activity    Alcohol use: No    Drug use: No    Sexual activity: Not on file   Lifestyle    Physical activity:     Days per week: Not on file     Minutes per session: Not on file    Stress: Not on file   Relationships    Social connections:     Talks on phone: Not on file     Gets together: Not on file     Attends Confucianist service: Not on file     Active member of club or organization: Not on file     Attends meetings of clubs or organizations: Not on file     Relationship status: Not on file    Intimate partner violence:     Fear of current or ex partner: Not on file     Emotionally abused: Not on file     Physically abused: Not on file     Forced sexual activity: Not on file   Other Topics Concern    Not on file   Social History Narrative    Caffeine Use    Dental Care, Regularly    Good Dental Hygiene    Uses Safety Equipment: Seatbelts         Allergies   Allergen Reactions    Other Itching     Adhesive Tape             /72   Pulse 61   Temp (!) 96 6 °F (35 9 °C) (Tympanic)   Ht 4' 9" (1 448 m)   Wt 44 5 kg (98 lb)   SpO2 (!) 89%   BMI 21 21 kg/m²          Physical Exam   Constitutional: She is oriented to person, place, and time  She appears well-developed and well-nourished  No distress  HENT:   Head: Normocephalic and atraumatic  Mouth/Throat: Oropharynx is clear and moist  No oropharyngeal exudate  Eyes: Pupils are equal, round, and reactive to light  Conjunctivae and EOM are normal  No scleral icterus  Neck: Normal range of motion  Neck supple  No JVD present     Cardiovascular: Normal rate and regular rhythm  No murmur heard  Pulmonary/Chest: Effort normal and breath sounds normal  No respiratory distress  Abdominal: Soft  Bowel sounds are normal  She exhibits no distension  There is no tenderness  Musculoskeletal: Normal range of motion  She exhibits no edema  Lymphadenopathy:     She has no cervical adenopathy  Neurological: She is alert and oriented to person, place, and time  She displays normal reflexes  No cranial nerve deficit  She exhibits normal muscle tone  Coordination normal    Skin: Skin is warm and dry  Capillary refill takes less than 2 seconds  She is not diaphoretic  Psychiatric: She has a normal mood and affect  Her behavior is normal  Judgment and thought content normal    Nursing note and vitals reviewed

## 2020-03-19 ENCOUNTER — OFFICE VISIT (OUTPATIENT)
Dept: INTERNAL MEDICINE CLINIC | Facility: CLINIC | Age: 85
End: 2020-03-19
Payer: COMMERCIAL

## 2020-03-19 VITALS
WEIGHT: 98 LBS | SYSTOLIC BLOOD PRESSURE: 122 MMHG | OXYGEN SATURATION: 88 % | HEIGHT: 57 IN | DIASTOLIC BLOOD PRESSURE: 78 MMHG | HEART RATE: 68 BPM | BODY MASS INDEX: 21.14 KG/M2 | TEMPERATURE: 98.1 F

## 2020-03-19 DIAGNOSIS — K21.9 GASTROESOPHAGEAL REFLUX DISEASE, ESOPHAGITIS PRESENCE NOT SPECIFIED: ICD-10-CM

## 2020-03-19 DIAGNOSIS — K52.9 COLITIS: Primary | ICD-10-CM

## 2020-03-19 DIAGNOSIS — K44.9 HIATAL HERNIA: ICD-10-CM

## 2020-03-19 PROBLEM — C50.919 MALIGNANT NEOPLASM OF BREAST (HCC): Status: RESOLVED | Noted: 2018-01-24 | Resolved: 2020-03-19

## 2020-03-19 PROBLEM — R10.31 RIGHT LOWER QUADRANT ABDOMINAL PAIN: Status: RESOLVED | Noted: 2019-07-26 | Resolved: 2020-03-19

## 2020-03-19 PROCEDURE — 1160F RVW MEDS BY RX/DR IN RCRD: CPT | Performed by: INTERNAL MEDICINE

## 2020-03-19 PROCEDURE — 3008F BODY MASS INDEX DOCD: CPT | Performed by: INTERNAL MEDICINE

## 2020-03-19 PROCEDURE — 1036F TOBACCO NON-USER: CPT | Performed by: INTERNAL MEDICINE

## 2020-03-19 PROCEDURE — 99214 OFFICE O/P EST MOD 30 MIN: CPT | Performed by: INTERNAL MEDICINE

## 2020-03-19 NOTE — PROGRESS NOTES
Assessment/Plan:         Diagnoses and all orders for this visit:    Colitis  Pt recently started gluten free diet and she feels it is helping She takes probiotic and is adding Metamucil as well Sxs are better but still present so hopefully diet changes will help alleviate further I did print info about gluten free diet choices    Gastroesophageal reflux disease, esophagitis presence not specified  Sxs managed presently off rx She rarely takes an otc antacid [place] diet  Hiatal hernia  Pt denies sxs She is more conscious of her food choices and that has been helpful she feels  She walks daily as well  I encouraged several small meals/day instead of three meals/day    Rto 4months       Patient ID: Deyanira Villalba is a 82 y o  female  HPI   Pt overall doing ok She does have some mild abdominal right sided discomfort but the bowels are better and no fever or sharp pain She was told by her chiropractor to try gluten free diet and she has vaguely started this and feels it is helping She is not fully sure about the diet She denies any bloating or heartburn and has been off antacids for awhile now On rare occasion she takes otc antacid with relief She wanted to know if ok to add metamucil as stools are firm at times Otherwise she is weathering the recent virus concerns well and stays in the majority of times anyway No cough She had alight sore throat today but she feels she may have been grinding her left molars and that caused discomfort  Review of Systems   Constitutional: Negative  HENT: Negative  Respiratory: Negative  Cardiovascular: Negative  Gastrointestinal: Negative  Genitourinary: Negative  Musculoskeletal: Negative  Skin: Negative  Neurological: Negative  Hematological: Negative  Psychiatric/Behavioral: Negative          Past Medical History:   Diagnosis Date    [facility]2007    Cellulitis of left upper arm     History of anxiety     History of appendicitis     History of gastroesophageal reflux (GERD)      Past Surgical History:   Procedure Laterality Date    APPENDECTOMY      Date Unknown    CATARACT EXTRACTION Bilateral     Left 2008 // Right 2008     SECTION      Date Unknown    HYSTERECTOMY      MASTECTOMY Right 2007    PARTIAL HYSTERECTOMY      fallopian tube removed, ? side    TUBAL LIGATION      Date Unknown     Social History     Socioeconomic History    Marital status:       Spouse name: Not on file    Number of children: Not on file    Years of education: Not on file    Highest education level: Not on file   Occupational History    Not on file   Social Needs    Financial resource strain: Not on file    Food insecurity:     Worry: Not on file     Inability: Not on file    Transportation needs:     Medical: Not on file     Non-medical: Not on file   Tobacco Use    Smoking status: Never Smoker    Smokeless tobacco: Never Used   Substance and Sexual Activity    Alcohol use: No    Drug use: No    Sexual activity: Not on file   Lifestyle    Physical activity:     Days per week: Not on file     Minutes per session: Not on file    Stress: Not on file   Relationships    Social connections:     Talks on phone: Not on file     Gets together: Not on file     Attends Mandaen service: Not on file     Active member of club or organization: Not on file     Attends meetings of clubs or organizations: Not on file     Relationship status: Not on file    Intimate partner violence:     Fear of current or ex partner: Not on file     Emotionally abused: Not on file     Physically abused: Not on file     Forced sexual activity: Not on file   Other Topics Concern    Not on file   Social History Narrative    Caffeine Use    Dental Care, Regularly    Good Dental Hygiene    Uses Safety Equipment: Seatbelts         Allergies   Allergen Reactions    Medical Tape     Other Itching     Adhesive Tape              BP 122/78   Pulse 68   Temp 98 1 °F (36 7 °C) (Tympanic)   Ht 4' 9" (1 448 m)   Wt 44 5 kg (98 lb)   SpO2 (!) 88%   BMI 21 21 kg/m²          Physical Exam   Constitutional: She is oriented to person, place, and time  She appears well-developed and well-nourished  No distress  HENT:   Head: Normocephalic and atraumatic  Mouth/Throat: Oropharynx is clear and moist  No oropharyngeal exudate  Eyes: Pupils are equal, round, and reactive to light  Conjunctivae and EOM are normal  No scleral icterus  Neck: Normal range of motion  Neck supple  No JVD present  Cardiovascular: Normal rate and regular rhythm  No murmur heard  Pulmonary/Chest: Effort normal and breath sounds normal    Abdominal: Soft  Bowel sounds are normal  She exhibits no distension  There is no tenderness  Musculoskeletal: Normal range of motion  She exhibits no edema or deformity  Lymphadenopathy:     She has no cervical adenopathy  Neurological: She is alert and oriented to person, place, and time  No cranial nerve deficit  Coordination normal    Skin: Skin is warm and dry  Capillary refill takes less than 2 seconds  She is not diaphoretic  No erythema  Psychiatric: She has a normal mood and affect  Her behavior is normal    Nursing note and vitals reviewed

## 2020-03-19 NOTE — PATIENT INSTRUCTIONS
Gluten-Free Diet   WHAT YOU NEED TO KNOW:   What is a gluten-free diet? A gluten-free diet is a meal plan that does not contain any gluten  Gluten is a protein found in wheat, rye, and barley  Gluten is found in many breads, pastas, cereals, cakes, pies, and cookies  Some vitamin supplements and medicines may also contain gluten  You need to follow this diet for the rest of your life if you have celiac disease, dermatitis herpetiformis, or non-celiac gluten sensitivity  Which foods should I avoid? Do not eat foods that contain the following ingredients:  · Barley, barley malt, barley extract, rye, bulgar, semolina, and triticale    · Wheat, wheat bran, wheat germ, and wheat starch     · Wheat varieties such as kamut and spelt    · Bran, couscous, durum, farina, and orzo    · Matzo flour, matzo meal, parvin flour     · Oats that are not labeled as gluten-free, unless your dietitian has allowed you to eat a small amount    · Malt extract and malt flavoring    · Gopal, madeleine, faro, nikky, seitan, and udon  What foods can I have? Choose foods labeled as gluten-free  You may be able to eat gluten-free oats, or you may be able to eat regular oats in small amounts  Ask your dietitian if oats are safe for you to eat  You may eat foods that are made with the following types of grains and starches:   · Corn, potato starch, and potato flour    · Soy, tapioca, and teff    · Rice, rice bran, quinoa, sago, and sorghum    · Amaranth, arrowroot, and buckwheat    · Flours made from nuts, beans, and seeds    · Flax, millet, and montina  What are examples of gluten-free foods?    · Fresh fruits and vegetables    · Eggs, meat, fish, and poultry    · Dried beans, lentils, and peas    · Beverages such as 100% fruit juice, coffee, tea, cocoa, and soft drinks    · Fats and oils, such as butter, margarine, and vegetable, canola, and olive oils    · Regular, unflavored milk, cottage cheese, most yogurts, and aged cheese such as cheddar cheese    · Cream, sour cream, cream cheese, most ice creams, and sherbets    · Cream of rice, grits, puffed rice, brown rice, and white rice    · Corn tacos and tortillas  What is a sample menu for 1 day? · Breakfast:  Soft boiled eggs, gluten-free toast with butter, and milk    · Morning snack:  Gluten-free rice cakes or crackers    · Lunch:  Tuna salad with tomato and lettuce, and an apple     · Afternoon snack:  Carrot sticks    · Dinner:  Broiled chicken, baked potato, green beans, and sorbet with strawberries  What can I do to make a gluten-free diet part of my lifestyle? · Follow up with your healthcare provider or dietitian as directed  It may take time to learn how to properly follow a gluten-free diet  Your dietitian can help you find ways to fit this diet into your lifestyle  · Learn to read food labels  Gluten is found in many foods and drinks  It may not be clear which foods contain gluten  Include a variety of allowed foods so that you can get all the nutrients you need  · Prepare for restaurant meals  Carry a list of items allowed on this diet with you when you are away from home  Go to the restaurant's website or call ahead to find out if the restaurant has gluten-free meals  Tell the  that you cannot eat wheat, rye, or barley  Ask how food is prepared  · Prepare gluten-free foods separately from other foods  Cross-contamination is when foods that contain gluten get mixed in with gluten-free foods  Prevent cross contamination by cleaning counter tops and cutting boards well to remove any crumbs that contain gluten  Wash cooking utensils, colanders, and pans well before you cook gluten-free foods  Buy a separate toaster to use for gluten-free breads  Buy separate jam, jelly, mayonnaise, or peanut butter to use on gluten-free breads to avoid cross contamination  These foods are also available in squeeze containers      · Include naturally gluten-free foods that are high in iron, folate, and vitamin B12  Foods high in iron and vitamin B12 include meat, fish, poultry (chicken and fish), liver, and eggs  Foods high in folate include broccoli, asparagus, orange juice, legumes, peanuts, walnuts, and sunflower seeds  · Ask your healthcare provider if you need a vitamin and mineral supplement  Celiac disease and dermatitis herpetiformis can cause damage to your intestines  This damage can decrease the absorption of certain vitamins and minerals  Also, many gluten-free cereals, pastas, and breads are not fortified with vitamin B and iron  When should I contact my healthcare provider? · You must take a medicine or vitamin that contains gluten  · Signs and symptoms of your condition get worse  · You are losing weight, without trying  · You have questions or concerns about your condition or care  CARE AGREEMENT:   You have the right to help plan your care  Discuss treatment options with your caregivers to decide what care you want to receive  You always have the right to refuse treatment  The above information is an  only  It is not intended as medical advice for individual conditions or treatments  Talk to your doctor, nurse or pharmacist before following any medical regimen to see if it is safe and effective for you  © 2017 [employer]  Information is for End User's use only and may not be sold, redistributed or otherwise used for commercial purposes  All illustrations and images included in CareNotes® are the copyrighted property of A D [employer] M [employer]  or [employer]

## 2020-06-12 DIAGNOSIS — F32.9 REACTIVE DEPRESSION: ICD-10-CM

## 2020-06-12 RX ORDER — CITALOPRAM 10 MG/1
10 TABLET ORAL DAILY
Qty: 30 TABLET | Refills: 5 | Status: SHIPPED | OUTPATIENT
Start: 2020-06-12 | End: 2020-12-10 | Stop reason: ALTCHOICE

## 2020-06-22 ENCOUNTER — TRANSCRIBE ORDERS (OUTPATIENT)
Dept: ADMINISTRATIVE | Facility: HOSPITAL | Age: 85
End: 2020-06-22

## 2020-06-22 DIAGNOSIS — Z12.31 VISIT FOR SCREENING MAMMOGRAM: Primary | ICD-10-CM

## 2020-07-11 ENCOUNTER — HOSPITAL ENCOUNTER (OUTPATIENT)
Dept: MAMMOGRAPHY | Facility: HOSPITAL | Age: 85
Discharge: HOME | End: 2020-07-11
Payer: COMMERCIAL

## 2020-07-11 VITALS — WEIGHT: 98 LBS | BODY MASS INDEX: 21.14 KG/M2 | HEIGHT: 57 IN

## 2020-07-11 DIAGNOSIS — Z12.31 VISIT FOR SCREENING MAMMOGRAM: ICD-10-CM

## 2020-07-11 PROCEDURE — 77067 SCR MAMMO BI INCL CAD: CPT

## 2020-07-11 PROCEDURE — 77063 BREAST TOMOSYNTHESIS BI: CPT

## 2020-07-28 ENCOUNTER — OFFICE VISIT (OUTPATIENT)
Dept: INTERNAL MEDICINE CLINIC | Facility: CLINIC | Age: 85
End: 2020-07-28
Payer: COMMERCIAL

## 2020-07-28 VITALS
DIASTOLIC BLOOD PRESSURE: 78 MMHG | HEIGHT: 57 IN | BODY MASS INDEX: 20.98 KG/M2 | TEMPERATURE: 97.6 F | WEIGHT: 97.25 LBS | SYSTOLIC BLOOD PRESSURE: 124 MMHG

## 2020-07-28 DIAGNOSIS — K52.9 COLITIS: ICD-10-CM

## 2020-07-28 DIAGNOSIS — E55.9 VITAMIN D DEFICIENCY: ICD-10-CM

## 2020-07-28 DIAGNOSIS — Z00.00 MEDICARE ANNUAL WELLNESS VISIT, SUBSEQUENT: Primary | ICD-10-CM

## 2020-07-28 DIAGNOSIS — Z11.59 ENCOUNTER FOR HEPATITIS C SCREENING TEST FOR LOW RISK PATIENT: ICD-10-CM

## 2020-07-28 PROCEDURE — 1125F AMNT PAIN NOTED PAIN PRSNT: CPT | Performed by: INTERNAL MEDICINE

## 2020-07-28 PROCEDURE — 3008F BODY MASS INDEX DOCD: CPT | Performed by: INTERNAL MEDICINE

## 2020-07-28 PROCEDURE — 1170F FXNL STATUS ASSESSED: CPT | Performed by: INTERNAL MEDICINE

## 2020-07-28 PROCEDURE — 1036F TOBACCO NON-USER: CPT | Performed by: INTERNAL MEDICINE

## 2020-07-28 PROCEDURE — G0439 PPPS, SUBSEQ VISIT: HCPCS | Performed by: INTERNAL MEDICINE

## 2020-07-28 PROCEDURE — 1160F RVW MEDS BY RX/DR IN RCRD: CPT | Performed by: INTERNAL MEDICINE

## 2020-07-28 NOTE — PROGRESS NOTES
Assessment and Plan:     Problem List Items Addressed This Visit        Digestive    Colitis    Relevant Orders    Celiac Panel 2 W/Reflex to Endomysial AB Titer       Other    Medicare annual wellness visit, subsequent - Primary    Relevant Orders    Lipid panel    Comprehensive metabolic panel    Vitamin D 25 hydroxy      Other Visit Diagnoses     Vitamin D deficiency        Relevant Orders    Vitamin D 25 hydroxy    Encounter for hepatitis C screening test for low risk patient        Relevant Orders    Hepatitis C antibody      Rx for fbw  Increase hydration  Exercise encouraged   Rto 6 months     Preventive health issues were discussed with patient, and age appropriate screening tests were ordered as noted in patient's After Visit Summary  Personalized health advice and appropriate referrals for health education or preventive services given if needed, as noted in patient's After Visit Summary       History of Present Illness:     Patient presents for Medicare Annual Wellness visit    Patient Care Team:  Lindsey Yao DO as PCP - General  MD Jazmyne Anna MD Laurice Fearing, DO     Problem List:     Patient Active Problem List   Diagnosis    Anxiety    Esophageal reflux    Hiatal hernia    Hyperlipidemia    Post-menopausal osteoporosis    Refused influenza vaccine    Medicare annual wellness visit, subsequent    Refusal of statin medication by patient    Colitis      Past Medical and Surgical History:     Past Medical History:   Diagnosis Date    [facility] ([facility] ) 2007    Cellulitis of left upper arm     History of anxiety     History of appendicitis     History of gastroesophageal reflux (GERD)      Past Surgical History:   Procedure Laterality Date    APPENDECTOMY      Date Unknown    CATARACT EXTRACTION Bilateral     Left 2008 // Right 2008     SECTION      Date Unknown    HYSTERECTOMY      MASTECTOMY Right 2007    PARTIAL HYSTERECTOMY fallopian tube removed, ? side    TUBAL LIGATION      Date Unknown      Family History:     Family History   Problem Relation Age of Onset    Lung cancer Mother     Colon cancer Sister     Hypertension Family     No Known Problems Father     No Known Problems Daughter     No Known Problems Maternal Grandmother     No Known Problems Maternal Grandfather     No Known Problems Paternal Grandmother     No Known Problems Paternal Grandfather     No Known Problems Sister     Drug abuse Neg Hx       Social History:     E-Cigarette/Vaping    E-Cigarette Use Never User      E-Cigarette/Vaping Substances    Nicotine No     THC No     CBD No     Flavoring No     Other No     Unknown No      Social History     Socioeconomic History    Marital status:       Spouse name: None    Number of children: None    Years of education: None    Highest education level: None   Occupational History    None   Social Needs    Financial resource strain: None    Food insecurity:     Worry: None     Inability: None    Transportation needs:     Medical: None     Non-medical: None   Tobacco Use    Smoking status: Never Smoker    Smokeless tobacco: Never Used   Substance and Sexual Activity    Alcohol use: No    Drug use: No    Sexual activity: None   Lifestyle    Physical activity:     Days per week: None     Minutes per session: None    Stress: None   Relationships    Social connections:     Talks on phone: None     Gets together: None     Attends Cheondoism service: None     Active member of club or organization: None     Attends meetings of clubs or organizations: None     Relationship status: None    Intimate partner violence:     Fear of current or ex partner: None     Emotionally abused: None     Physically abused: None     Forced sexual activity: None   Other Topics Concern    None   Social History Narrative    Caffeine Use    Dental Care, Regularly    Good Dental Hygiene    Uses Safety Equipment: Seatbelts          Medications and Allergies:     Current Outpatient Medications   Medication Sig Dispense Refill    bimatoprost (LUMIGAN) 0 01 % ophthalmic drops Administer 1 drop to both eyes daily at bedtime      Probiotic Product (PROBIOTIC DAILY PO) Take by mouth      citalopram (CeleXA) 10 mg tablet Take 1 tablet (10 mg total) by mouth daily 30 tablet 5    dicyclomine (BENTYL) 10 mg capsule Take 1 capsule (10 mg total) by mouth every 8 (eight) hours for 10 days 30 capsule 0     No current facility-administered medications for this visit  Allergies   Allergen Reactions    Medical Tape     Other Itching     Adhesive Tape      Immunizations: There is no immunization history for the selected administration types on file for this patient  Health Maintenance: There are no preventive care reminders to display for this patient  Topic Date Due    DTaP,Tdap,and Td Vaccines (1 - Tdap) 09/05/1946      Medicare Health Risk Assessment:     /78   Temp 97 6 °F (36 4 °C) (Tympanic)   Ht 4' 9" (1 448 m)   Wt 44 1 kg (97 lb 4 oz)   BMI 21 04 kg/m²      Tasha Pantoja is here for her Subsequent Wellness visit  Last Medicare Wellness visit information reviewed, patient interviewed and updates made to the record today  Health Risk Assessment:   Patient rates overall health as very good  Patient feels that their physical health rating is same  Eyesight was rated as same  Hearing was rated as same  Patient feels that their emotional and mental health rating is same  Pain experienced in the last 7 days has been none  Patient states that she has experienced no weight loss or gain in last 6 months  Depression Screening:   PHQ-2 Score: 1  PHQ-9 Score: 1      Fall Risk Screening: In the past year, patient has experienced: no history of falling in past year      Urinary Incontinence Screening:   Patient has not leaked urine accidently in the last six months       Home Safety:  Patient has trouble with stairs inside or outside of their home  Patient has working smoke alarms and has no working carbon monoxide detector  Home safety hazards include: none  Nutrition:   Current diet is Regular  Medications:   Patient is currently taking over-the-counter supplements  OTC medications include: see medication list  Patient is able to manage medications  Activities of Daily Living (ADLs)/Instrumental Activities of Daily Living (IADLs):   Walk and transfer into and out of bed and chair?: Yes  Dress and groom yourself?: Yes    Bathe or shower yourself?: Yes    Feed yourself? Yes  Do your laundry/housekeeping?: Yes  Manage your money, pay your bills and track your expenses?: Yes  Make your own meals?: Yes    Do your own shopping?: Yes    Previous Hospitalizations:   Any hospitalizations or ED visits within the last 12 months?: Yes    How many hospitalizations have you had in the last year?: 1-2    Advance Care Planning:   Living will: Yes    Durable POA for healthcare:  Yes    Advanced directive: Yes    End of Life Decisions reviewed with patient: Yes    Provider agrees with end of life decisions: Yes      Cognitive Screening:   Provider or family/friend/caregiver concerned regarding cognition?: No    PREVENTIVE SCREENINGS      Cardiovascular Screening:    General: Screening Not Indicated, History Lipid Disorder and Risks and Benefits Discussed    Due for: Lipid Panel      Diabetes Screening:     General: Risks and Benefits Discussed      Colorectal Cancer Screening:     General: Screening Not Indicated      Breast Cancer Screening:     General: History Breast Cancer and Screening Current      Cervical Cancer Screening:    General: Screening Not Indicated      Osteoporosis Screening:    General: Screening Not Indicated and History Osteoporosis      Abdominal Aortic Aneurysm (AAA) Screening:        General: Screening Not Indicated      Lung Cancer Screening:     General: Screening Not Indicated      Hepatitis C Screening:    General: Risks and Benefits Discussed    Hep C Screening Accepted: Yes      Other Counseling Topics:   Regular weightbearing exercise         Raul Lomax DO

## 2020-08-11 ENCOUNTER — APPOINTMENT (OUTPATIENT)
Dept: LAB | Facility: MEDICAL CENTER | Age: 85
End: 2020-08-11
Payer: COMMERCIAL

## 2020-08-11 DIAGNOSIS — E55.9 VITAMIN D DEFICIENCY: ICD-10-CM

## 2020-08-11 DIAGNOSIS — Z11.59 ENCOUNTER FOR HEPATITIS C SCREENING TEST FOR LOW RISK PATIENT: ICD-10-CM

## 2020-08-11 DIAGNOSIS — K52.9 COLITIS: ICD-10-CM

## 2020-08-11 DIAGNOSIS — Z00.00 MEDICARE ANNUAL WELLNESS VISIT, SUBSEQUENT: ICD-10-CM

## 2020-08-11 LAB
25(OH)D3 SERPL-MCNC: 41.4 NG/ML (ref 30–100)
ALBUMIN SERPL BCP-MCNC: 3.7 G/DL (ref 3.5–5)
ALP SERPL-CCNC: 125 U/L (ref 46–116)
ALT SERPL W P-5'-P-CCNC: 19 U/L (ref 12–78)
ANION GAP SERPL CALCULATED.3IONS-SCNC: 7 MMOL/L (ref 4–13)
AST SERPL W P-5'-P-CCNC: 12 U/L (ref 5–45)
BILIRUB SERPL-MCNC: 0.39 MG/DL (ref 0.2–1)
BUN SERPL-MCNC: 19 MG/DL (ref 5–25)
CALCIUM SERPL-MCNC: 9.7 MG/DL (ref 8.3–10.1)
CHLORIDE SERPL-SCNC: 105 MMOL/L (ref 100–108)
CHOLEST SERPL-MCNC: 258 MG/DL (ref 50–200)
CO2 SERPL-SCNC: 28 MMOL/L (ref 21–32)
CREAT SERPL-MCNC: 0.89 MG/DL (ref 0.6–1.3)
GFR SERPL CREATININE-BSD FRML MDRD: 61 ML/MIN/1.73SQ M
GLUCOSE P FAST SERPL-MCNC: 94 MG/DL (ref 65–99)
HCV AB SER QL: NORMAL
HDLC SERPL-MCNC: 59 MG/DL
LDLC SERPL CALC-MCNC: 171 MG/DL (ref 0–100)
NONHDLC SERPL-MCNC: 199 MG/DL
POTASSIUM SERPL-SCNC: 4.6 MMOL/L (ref 3.5–5.3)
PROT SERPL-MCNC: 8 G/DL (ref 6.4–8.2)
SODIUM SERPL-SCNC: 140 MMOL/L (ref 136–145)
TRIGL SERPL-MCNC: 142 MG/DL

## 2020-08-11 PROCEDURE — 83516 IMMUNOASSAY NONANTIBODY: CPT

## 2020-08-11 PROCEDURE — 82784 ASSAY IGA/IGD/IGG/IGM EACH: CPT

## 2020-08-11 PROCEDURE — 36415 COLL VENOUS BLD VENIPUNCTURE: CPT

## 2020-08-11 PROCEDURE — 86255 FLUORESCENT ANTIBODY SCREEN: CPT

## 2020-08-11 PROCEDURE — 80061 LIPID PANEL: CPT

## 2020-08-11 PROCEDURE — 80053 COMPREHEN METABOLIC PANEL: CPT

## 2020-08-11 PROCEDURE — 82306 VITAMIN D 25 HYDROXY: CPT

## 2020-08-11 PROCEDURE — 86803 HEPATITIS C AB TEST: CPT

## 2020-08-12 LAB
ENDOMYSIUM IGA SER QL: NEGATIVE
GLIADIN PEPTIDE IGA SER-ACNC: 9 UNITS (ref 0–19)
GLIADIN PEPTIDE IGG SER-ACNC: 11 UNITS (ref 0–19)
IGA SERPL-MCNC: 376 MG/DL (ref 64–422)
TTG IGA SER-ACNC: <2 U/ML (ref 0–3)
TTG IGG SER-ACNC: 14 U/ML (ref 0–5)

## 2020-12-10 ENCOUNTER — OFFICE VISIT (OUTPATIENT)
Dept: INTERNAL MEDICINE CLINIC | Facility: CLINIC | Age: 85
End: 2020-12-10
Payer: COMMERCIAL

## 2020-12-10 VITALS
HEIGHT: 57 IN | TEMPERATURE: 96.8 F | DIASTOLIC BLOOD PRESSURE: 78 MMHG | SYSTOLIC BLOOD PRESSURE: 124 MMHG | BODY MASS INDEX: 21.44 KG/M2 | WEIGHT: 99.38 LBS

## 2020-12-10 DIAGNOSIS — E78.2 MIXED HYPERLIPIDEMIA: ICD-10-CM

## 2020-12-10 DIAGNOSIS — F41.9 ANXIETY: ICD-10-CM

## 2020-12-10 DIAGNOSIS — K52.9 COLITIS: Primary | ICD-10-CM

## 2020-12-10 DIAGNOSIS — K59.04 CHRONIC IDIOPATHIC CONSTIPATION: ICD-10-CM

## 2020-12-10 PROCEDURE — 1036F TOBACCO NON-USER: CPT | Performed by: INTERNAL MEDICINE

## 2020-12-10 PROCEDURE — 1160F RVW MEDS BY RX/DR IN RCRD: CPT | Performed by: INTERNAL MEDICINE

## 2020-12-10 PROCEDURE — 99214 OFFICE O/P EST MOD 30 MIN: CPT | Performed by: INTERNAL MEDICINE

## 2021-02-26 ENCOUNTER — TELEPHONE (OUTPATIENT)
Dept: INTERNAL MEDICINE CLINIC | Facility: CLINIC | Age: 86
End: 2021-02-26

## 2021-02-26 DIAGNOSIS — J32.9 SINUSITIS, UNSPECIFIED CHRONICITY, UNSPECIFIED LOCATION: Primary | ICD-10-CM

## 2021-02-26 RX ORDER — AMOXICILLIN 500 MG/1
500 CAPSULE ORAL EVERY 8 HOURS SCHEDULED
Qty: 21 CAPSULE | Refills: 0 | Status: SHIPPED | OUTPATIENT
Start: 2021-02-26 | End: 2021-03-05

## 2021-02-26 RX ORDER — PROMETHAZINE HYDROCHLORIDE AND CODEINE PHOSPHATE 6.25; 1 MG/5ML; MG/5ML
5 SYRUP ORAL EVERY 6 HOURS PRN
Qty: 118 ML | Refills: 0 | Status: SHIPPED | OUTPATIENT
Start: 2021-02-26 | End: 2021-09-01 | Stop reason: ALTCHOICE

## 2021-02-26 NOTE — TELEPHONE ENCOUNTER
Patient has a cough, states she always get this and you give her phenergan with codeine  Denies any other symptoms, Afebrile, no exposure       clint

## 2021-04-08 ENCOUNTER — OFFICE VISIT (OUTPATIENT)
Dept: INTERNAL MEDICINE CLINIC | Facility: CLINIC | Age: 86
End: 2021-04-08
Payer: COMMERCIAL

## 2021-04-08 VITALS
DIASTOLIC BLOOD PRESSURE: 70 MMHG | SYSTOLIC BLOOD PRESSURE: 128 MMHG | TEMPERATURE: 97.2 F | WEIGHT: 105.25 LBS | HEIGHT: 57 IN | BODY MASS INDEX: 22.71 KG/M2

## 2021-04-08 DIAGNOSIS — K44.9 HIATAL HERNIA: ICD-10-CM

## 2021-04-08 DIAGNOSIS — H40.9 GLAUCOMA OF LEFT EYE, UNSPECIFIED GLAUCOMA TYPE: ICD-10-CM

## 2021-04-08 DIAGNOSIS — K59.04 CHRONIC IDIOPATHIC CONSTIPATION: Primary | ICD-10-CM

## 2021-04-08 DIAGNOSIS — F41.9 ANXIETY: ICD-10-CM

## 2021-04-08 PROCEDURE — 3725F SCREEN DEPRESSION PERFORMED: CPT | Performed by: INTERNAL MEDICINE

## 2021-04-08 PROCEDURE — 99214 OFFICE O/P EST MOD 30 MIN: CPT | Performed by: INTERNAL MEDICINE

## 2021-04-08 PROCEDURE — 1160F RVW MEDS BY RX/DR IN RCRD: CPT | Performed by: INTERNAL MEDICINE

## 2021-04-08 PROCEDURE — 1036F TOBACCO NON-USER: CPT | Performed by: INTERNAL MEDICINE

## 2021-04-08 RX ORDER — CITALOPRAM 10 MG/1
10 TABLET ORAL DAILY
Qty: 30 TABLET | Refills: 5 | Status: SHIPPED | OUTPATIENT
Start: 2021-04-08 | End: 2021-09-01 | Stop reason: ALTCHOICE

## 2021-04-08 NOTE — PROGRESS NOTES
Assessment/Plan:         Diagnoses and all orders for this visit:    Chronic idiopathic constipation  Pt has a sytem and seems to be managing with that at this time  Continue current bowel regimen (prune juice, colace, align)    Anxiety  -     citalopram (CeleXA) 10 mg tablet; Take 1 tablet (10 mg total) by mouth daily  Pt feels she should resume rx and refill sent to pharmacy    Hiatal hernia  No acute sxs She is trying to eat small portions thru the day and managing her bowels with present regimen seems effective       Pt does not want covid vaccine    Rto 4 months/awv     Patient ID: Jairo Still is a 82 y o  female  HPI  Pt bowels are ok with current regimen - colace, prune juice and align She is drinking more fluids and keeps busy She feels more overwhlemed and wants to restart citalopram She feels alone most of the time and has considered relocating but her daughter's horses are on her property so she could not sell it  She had an issue with glaucoma rx and was not pleased with Dr Mariaa Yap so is asking for another referral locally She seemed ot have rxn to drops he prescribed but he did not setup followup for 2 months and that concerned pt No acute vision sxs today       Review of Systems   Constitutional: Negative for activity change, chills and fever  HENT: Positive for postnasal drip  Respiratory: Negative for cough, chest tightness and shortness of breath  Cardiovascular: Negative for chest pain, palpitations and leg swelling  Gastrointestinal: Positive for constipation  Genitourinary: Negative for difficulty urinating and dysuria  Musculoskeletal: Positive for arthralgias  Neurological: Negative for dizziness, light-headedness and headaches  Psychiatric/Behavioral: Negative for sleep disturbance  The patient is not nervous/anxious          Past Medical History:   Diagnosis Date    Breast cancer ([facility] [address] ) 07/19/2007    Cellulitis of left upper arm     History of anxiety     History of appendicitis     History of gastroesophageal reflux (GERD)      Past Surgical History:   Procedure Laterality Date    APPENDECTOMY      Date Unknown    CATARACT EXTRACTION Bilateral     Left 2008 // Right 2008     SECTION      Date Unknown    HYSTERECTOMY      MASTECTOMY Right 2007    PARTIAL HYSTERECTOMY      fallopian tube removed, ? side    TUBAL LIGATION      Date Unknown     Social History     Socioeconomic History    Marital status:       Spouse name: Not on file    Number of children: Not on file    Years of education: Not on file    Highest education level: Not on file   Occupational History    Not on file   Social Needs    Financial resource strain: Not on file    Food insecurity     Worry: Not on file     Inability: Not on file    Transportation needs     Medical: Not on file     Non-medical: Not on file   Tobacco Use    Smoking status: Never Smoker    Smokeless tobacco: Never Used   Substance and Sexual Activity    Alcohol use: No    Drug use: No    Sexual activity: Not on file   Lifestyle    Physical activity     Days per week: Not on file     Minutes per session: Not on file    Stress: Not on file   Relationships    Social connections     Talks on phone: Not on file     Gets together: Not on file     Attends Confucianist service: Not on file     Active member of club or organization: Not on file     Attends meetings of clubs or organizations: Not on file     Relationship status: Not on file    Intimate partner violence     Fear of current or ex partner: Not on file     Emotionally abused: Not on file     Physically abused: Not on file     Forced sexual activity: Not on file   Other Topics Concern    Not on file   Social History Narrative    Caffeine Use    Dental Care, Regularly    Good Dental Hygiene    Uses Safety Equipment: Seatbelts         Allergies   Allergen Reactions    Medical Tape     Other Itching     Adhesive Tape            BP 128/70   Temp (!) 97 2 °F (36 2 °C) (Temporal)   Ht 4' 9" (1 448 m)   Wt 47 7 kg (105 lb 4 oz)   BMI 22 78 kg/m²          Physical Exam  Vitals signs reviewed  Constitutional:       General: She is not in acute distress  Appearance: Normal appearance  She is not ill-appearing, toxic-appearing or diaphoretic  HENT:      Head: Normocephalic and atraumatic  Right Ear: Tympanic membrane, ear canal and external ear normal  There is no impacted cerumen  Left Ear: Tympanic membrane, ear canal and external ear normal  There is no impacted cerumen  Nose: Nose normal       Mouth/Throat:      Mouth: Mucous membranes are dry  Eyes:      General: No scleral icterus  Extraocular Movements: Extraocular movements intact  Conjunctiva/sclera: Conjunctivae normal       Pupils: Pupils are equal, round, and reactive to light  Neck:      Musculoskeletal: Normal range of motion and neck supple  No neck rigidity  Cardiovascular:      Rate and Rhythm: Normal rate and regular rhythm  Pulses: Normal pulses  Heart sounds: Normal heart sounds  No murmur  Pulmonary:      Effort: Pulmonary effort is normal  No respiratory distress  Breath sounds: Normal breath sounds  No wheezing  Abdominal:      General: Bowel sounds are normal  There is no distension  Palpations: Abdomen is soft  Tenderness: There is no abdominal tenderness  Musculoskeletal: Normal range of motion  General: No swelling or tenderness  Lymphadenopathy:      Cervical: No cervical adenopathy  Skin:     General: Skin is dry  Coloration: Skin is not jaundiced or pale  Findings: No erythema  Neurological:      General: No focal deficit present  Mental Status: She is alert and oriented to person, place, and time  Mental status is at baseline  Cranial Nerves: No cranial nerve deficit  Sensory: No sensory deficit     Psychiatric:         Mood and Affect: Mood normal  Behavior: Behavior normal          Thought Content:  Thought content normal          Judgment: Judgment normal

## 2021-04-15 ENCOUNTER — APPOINTMENT (OUTPATIENT)
Dept: LAB | Facility: MEDICAL CENTER | Age: 86
End: 2021-04-15
Payer: COMMERCIAL

## 2021-04-15 DIAGNOSIS — E78.2 MIXED HYPERLIPIDEMIA: ICD-10-CM

## 2021-04-15 DIAGNOSIS — K59.04 CHRONIC IDIOPATHIC CONSTIPATION: ICD-10-CM

## 2021-04-15 DIAGNOSIS — K52.9 COLITIS: ICD-10-CM

## 2021-04-15 LAB
ALBUMIN SERPL BCP-MCNC: 3.7 G/DL (ref 3.5–5)
ALP SERPL-CCNC: 123 U/L (ref 46–116)
ALT SERPL W P-5'-P-CCNC: 24 U/L (ref 12–78)
ANION GAP SERPL CALCULATED.3IONS-SCNC: 3 MMOL/L (ref 4–13)
AST SERPL W P-5'-P-CCNC: 14 U/L (ref 5–45)
BILIRUB SERPL-MCNC: 0.4 MG/DL (ref 0.2–1)
BUN SERPL-MCNC: 16 MG/DL (ref 5–25)
CALCIUM SERPL-MCNC: 9.1 MG/DL (ref 8.3–10.1)
CHLORIDE SERPL-SCNC: 106 MMOL/L (ref 100–108)
CHOLEST SERPL-MCNC: 245 MG/DL (ref 50–200)
CO2 SERPL-SCNC: 29 MMOL/L (ref 21–32)
CREAT SERPL-MCNC: 1.01 MG/DL (ref 0.6–1.3)
GFR SERPL CREATININE-BSD FRML MDRD: 52 ML/MIN/1.73SQ M
GLUCOSE P FAST SERPL-MCNC: 91 MG/DL (ref 65–99)
HDLC SERPL-MCNC: 66 MG/DL
LDLC SERPL CALC-MCNC: 152 MG/DL (ref 0–100)
NONHDLC SERPL-MCNC: 179 MG/DL
POTASSIUM SERPL-SCNC: 4.6 MMOL/L (ref 3.5–5.3)
PROT SERPL-MCNC: 7.3 G/DL (ref 6.4–8.2)
SODIUM SERPL-SCNC: 138 MMOL/L (ref 136–145)
TRIGL SERPL-MCNC: 134 MG/DL

## 2021-04-15 PROCEDURE — 36415 COLL VENOUS BLD VENIPUNCTURE: CPT

## 2021-04-15 PROCEDURE — 80053 COMPREHEN METABOLIC PANEL: CPT

## 2021-04-15 PROCEDURE — 80061 LIPID PANEL: CPT

## 2021-04-27 ENCOUNTER — RX ONLY (RX ONLY)
Age: 86
End: 2021-04-27

## 2021-04-27 ENCOUNTER — DOCTOR'S OFFICE (OUTPATIENT)
Dept: URBAN - NONMETROPOLITAN AREA CLINIC 1 | Facility: CLINIC | Age: 86
Setting detail: OPHTHALMOLOGY
End: 2021-04-27
Payer: MEDICARE

## 2021-04-27 DIAGNOSIS — H40.1134: ICD-10-CM

## 2021-04-27 DIAGNOSIS — Z96.1: ICD-10-CM

## 2021-04-27 DIAGNOSIS — H35.373: ICD-10-CM

## 2021-04-27 PROCEDURE — 92134 CPTRZ OPH DX IMG PST SGM RTA: CPT | Performed by: OPHTHALMOLOGY

## 2021-04-27 PROCEDURE — 92004 COMPRE OPH EXAM NEW PT 1/>: CPT | Performed by: OPHTHALMOLOGY

## 2021-04-27 PROCEDURE — 76514 ECHO EXAM OF EYE THICKNESS: CPT | Performed by: OPHTHALMOLOGY

## 2021-04-27 PROCEDURE — 92250 FUNDUS PHOTOGRAPHY W/I&R: CPT | Performed by: OPHTHALMOLOGY

## 2021-04-27 ASSESSMENT — CORNEAL DYSTROPHY - POSTERIOR
OS_POSTERIORDYSTROPHY: GUTTATA
OD_POSTERIORDYSTROPHY: GUTTATA

## 2021-04-27 ASSESSMENT — REFRACTION_CURRENTRX
OD_AXIS: 180
OD_OVR_VA: 20/
OS_AXIS: 018
OD_CYLINDER: 0.00
OS_OVR_VA: 20/
OS_SPHERE: -2.50
OD_SPHERE: -1.50
OS_CYLINDER: -1.75

## 2021-04-27 ASSESSMENT — REFRACTION_AUTOREFRACTION
OS_AXIS: 076
OD_SPHERE: -0.25
OS_SPHERE: -1.00
OD_CYLINDER: -4.00
OD_AXIS: 115
OS_CYLINDER: -4.25

## 2021-04-27 ASSESSMENT — CONFRONTATIONAL VISUAL FIELD TEST (CVF)
OS_FINDINGS: FULL
OD_FINDINGS: FULL

## 2021-04-27 ASSESSMENT — DRY EYES - PHYSICIAN NOTES
OD_GENERALCOMMENTS: KSICCA
OS_GENERALCOMMENTS: KSICCA

## 2021-04-27 ASSESSMENT — PACHYMETRY
OS_CT_UM: 541
OD_CT_UM: 540
OS_CT_CORRECTION: 0
OD_CT_CORRECTION: 0

## 2021-04-27 ASSESSMENT — SPHEQUIV_DERIVED
OS_SPHEQUIV: -3.125
OD_SPHEQUIV: -2.25

## 2021-04-27 ASSESSMENT — VISUAL ACUITY
OD_BCVA: 20/40-2
OS_BCVA: 20/30-2

## 2021-04-27 ASSESSMENT — TONOMETRY: OD_IOP_MMHG: 21

## 2021-07-12 ENCOUNTER — RX ONLY (RX ONLY)
Age: 86
End: 2021-07-12

## 2021-07-12 ENCOUNTER — HOSPITAL ENCOUNTER (OUTPATIENT)
Dept: MAMMOGRAPHY | Facility: HOSPITAL | Age: 86
Discharge: HOME | End: 2021-07-12
Payer: COMMERCIAL

## 2021-07-12 ENCOUNTER — DOCTOR'S OFFICE (OUTPATIENT)
Dept: URBAN - NONMETROPOLITAN AREA CLINIC 1 | Facility: CLINIC | Age: 86
Setting detail: OPHTHALMOLOGY
End: 2021-07-12
Payer: MEDICARE

## 2021-07-12 VITALS — BODY MASS INDEX: 22.65 KG/M2 | WEIGHT: 105 LBS | HEIGHT: 57 IN

## 2021-07-12 DIAGNOSIS — Z96.1: ICD-10-CM

## 2021-07-12 DIAGNOSIS — H40.1122: ICD-10-CM

## 2021-07-12 DIAGNOSIS — H40.1111: ICD-10-CM

## 2021-07-12 DIAGNOSIS — Z12.31 VISIT FOR SCREENING MAMMOGRAM: ICD-10-CM

## 2021-07-12 PROCEDURE — 92014 COMPRE OPH EXAM EST PT 1/>: CPT | Performed by: OPHTHALMOLOGY

## 2021-07-12 PROCEDURE — 77063 BREAST TOMOSYNTHESIS BI: CPT

## 2021-07-12 PROCEDURE — 92020 GONIOSCOPY: CPT | Performed by: OPHTHALMOLOGY

## 2021-07-12 PROCEDURE — 77067 SCR MAMMO BI INCL CAD: CPT

## 2021-07-12 RX ORDER — DORZOLAMIDE HYDROCHLORIDE 20 MG/ML
SOLUTION/ DROPS OPHTHALMIC
Qty: 2 | Refills: 3 | Status: ACTIVE | OUTPATIENT

## 2021-07-12 RX ORDER — BIMATOPROST 0.1 MG/ML
SOLUTION/ DROPS OPHTHALMIC
Qty: 7.5 | Refills: 3 | Status: ACTIVE | OUTPATIENT

## 2021-07-12 ASSESSMENT — PACHYMETRY
OD_CT_UM: 540
OS_CT_CORRECTION: 0
OS_CT_UM: 541
OD_CT_CORRECTION: 0

## 2021-07-12 ASSESSMENT — CONFRONTATIONAL VISUAL FIELD TEST (CVF)
OS_FINDINGS: FULL
OD_FINDINGS: FULL

## 2021-07-12 ASSESSMENT — TONOMETRY: OD_IOP_MMHG: 21

## 2021-07-25 ASSESSMENT — REFRACTION_CURRENTRX
OD_AXIS: 180
OD_SPHERE: -1.50
OS_AXIS: 018
OD_OVR_VA: 20/
OS_OVR_VA: 20/
OS_SPHERE: -2.50
OD_CYLINDER: 0.00
OS_CYLINDER: -1.75

## 2021-07-25 ASSESSMENT — REFRACTION_AUTOREFRACTION
OD_SPHERE: -0.25
OS_SPHERE: -1.00
OS_AXIS: 076
OS_CYLINDER: -4.25
OD_AXIS: 115
OD_CYLINDER: -4.00

## 2021-07-25 ASSESSMENT — SPHEQUIV_DERIVED
OS_SPHEQUIV: -3.125
OD_SPHEQUIV: -2.25

## 2021-07-25 ASSESSMENT — VISUAL ACUITY
OD_BCVA: 20/40-1
OS_BCVA: 20/30-2

## 2021-07-30 ENCOUNTER — RA CDI HCC (OUTPATIENT)
Dept: OTHER | Facility: HOSPITAL | Age: 86
End: 2021-07-30

## 2021-07-30 NOTE — PROGRESS NOTES
[facility]  coding opportunities          Chart reviewed, no opportunity found: CHART REVIEWED, NO OPPORTUNITY FOUND                     Patients insurance company: [facility] [address]  (Medicare Advantage and Commercial)

## 2021-08-05 ENCOUNTER — OFFICE VISIT (OUTPATIENT)
Dept: INTERNAL MEDICINE CLINIC | Facility: CLINIC | Age: 86
End: 2021-08-05
Payer: COMMERCIAL

## 2021-08-05 VITALS
BODY MASS INDEX: 22.49 KG/M2 | HEIGHT: 57 IN | DIASTOLIC BLOOD PRESSURE: 70 MMHG | WEIGHT: 104.25 LBS | SYSTOLIC BLOOD PRESSURE: 124 MMHG | TEMPERATURE: 96.6 F

## 2021-08-05 DIAGNOSIS — Z00.00 MEDICARE ANNUAL WELLNESS VISIT, SUBSEQUENT: Primary | ICD-10-CM

## 2021-08-05 PROCEDURE — 1125F AMNT PAIN NOTED PAIN PRSNT: CPT | Performed by: INTERNAL MEDICINE

## 2021-08-05 PROCEDURE — 1160F RVW MEDS BY RX/DR IN RCRD: CPT | Performed by: INTERNAL MEDICINE

## 2021-08-05 PROCEDURE — 3725F SCREEN DEPRESSION PERFORMED: CPT | Performed by: INTERNAL MEDICINE

## 2021-08-05 PROCEDURE — G0439 PPPS, SUBSEQ VISIT: HCPCS | Performed by: INTERNAL MEDICINE

## 2021-08-05 PROCEDURE — 1170F FXNL STATUS ASSESSED: CPT | Performed by: INTERNAL MEDICINE

## 2021-08-05 PROCEDURE — 1036F TOBACCO NON-USER: CPT | Performed by: INTERNAL MEDICINE

## 2021-08-05 PROCEDURE — 3288F FALL RISK ASSESSMENT DOCD: CPT | Performed by: INTERNAL MEDICINE

## 2021-08-05 RX ORDER — DORZOLAMIDE HCL 20 MG/ML
1 SOLUTION/ DROPS OPHTHALMIC
COMMUNITY
Start: 2021-07-12

## 2021-08-05 NOTE — PATIENT INSTRUCTIONS

## 2021-08-05 NOTE — PROGRESS NOTES
Assessment and Plan:     Problem List Items Addressed This Visit        Other    Medicare annual wellness visit, subsequent - Primary      Increase hydration  Exercise encouraged   Reviewed recent labs - not interested in statin  Increase fiber   Rto 6 months      Preventive health issues were discussed with patient, and age appropriate screening tests were ordered as noted in patient's After Visit Summary  Personalized health advice and appropriate referrals for health education or preventive services given if needed, as noted in patient's After Visit Summary       History of Present Illness:     Patient presents for Medicare Annual Wellness visit    Patient Care Team:  Rafael Smith DO as PCP - MD Leslie Milner MD Martin Sauer, DO     Problem List:     Patient Active Problem List   Diagnosis    Anxiety    Esophageal reflux    Hiatal hernia    Hyperlipidemia    Post-menopausal osteoporosis    Refused influenza vaccine    Medicare annual wellness visit, subsequent    Refusal of statin medication by patient    Colitis    Chronic idiopathic constipation      Past Medical and Surgical History:     Past Medical History:   Diagnosis Date    Breast cancer ([facility] [address] ) 2007    Cellulitis of left upper arm     History of anxiety     History of appendicitis     History of gastroesophageal reflux (GERD)      Past Surgical History:   Procedure Laterality Date    APPENDECTOMY      Date Unknown    CATARACT EXTRACTION Bilateral     Left 2008 // Right 2008     SECTION      Date Unknown    HYSTERECTOMY      MASTECTOMY Right 2007    PARTIAL HYSTERECTOMY      fallopian tube removed, ? side    TUBAL LIGATION      Date Unknown      Family History:     Family History   Problem Relation Age of Onset    Lung cancer Mother     Colon cancer Sister     Hypertension Family     No Known Problems Father     No Known Problems Daughter     No Known Problems Maternal Grandmother     No Known Problems Maternal Grandfather     No Known Problems Paternal Grandmother     No Known Problems Paternal Grandfather     No Known Problems Sister     Drug abuse Neg Hx       Social History:     Social History     Socioeconomic History    Marital status:      Spouse name: None    Number of children: None    Years of education: None    Highest education level: None   Occupational History    None   Tobacco Use    Smoking status: Never Smoker    Smokeless tobacco: Never Used   Vaping Use    Vaping Use: Never used   Substance and Sexual Activity    Alcohol use: No    Drug use: No    Sexual activity: None   Other Topics Concern    None   Social History Narrative    Caffeine Use    Dental Care, Regularly    Good Dental Hygiene    Uses Safety Equipment: Seatbelts         Social Determinants of Health     Financial Resource Strain:     Difficulty of Paying Living Expenses:    Food Insecurity:     Worried About Running Out of Food in the Last Year:     Ran Out of Food in the Last Year:    Transportation Needs:     Lack of Transportation (Medical):      Lack of Transportation (Non-Medical):    Physical Activity:     Days of Exercise per Week:     Minutes of Exercise per Session:    Stress:     Feeling of Stress :    Social Connections:     Frequency of Communication with Friends and Family:     Frequency of Social Gatherings with Friends and Family:     Attends Islam Services:     Active Member of Clubs or Organizations:     Attends Club or Organization Meetings:     Marital Status:    Intimate Partner Violence:     Fear of Current or Ex-Partner:     Emotionally Abused:     Physically Abused:     Sexually Abused:       Medications and Allergies:     Current Outpatient Medications   Medication Sig Dispense Refill    bimatoprost (LUMIGAN) 0 01 % ophthalmic drops Administer 1 drop to both eyes daily at bedtime      citalopram (CeleXA) 10 mg tablet Take 1 tablet (10 mg total) by mouth daily 30 tablet 5    Docusate Sodium (COLACE PO) Take by mouth      dorzolamide (TRUSOPT) 2 % ophthalmic solution Administer 1 drop to both eyes       Probiotic Product (PROBIOTIC DAILY PO) Take by mouth      promethazine-codeine (PHENERGAN WITH CODEINE) 6 25-10 mg/5 mL syrup Take 5 mL by mouth every 6 (six) hours as needed for cough (Patient not taking: Reported on 4/8/2021) 118 mL 0     No current facility-administered medications for this visit  Allergies   Allergen Reactions    Medical Tape     Other Itching     Adhesive Tape      Immunizations: There is no immunization history for the selected administration types on file for this patient  Health Maintenance:         Topic Date Due    Hepatitis C Screening  Completed         Topic Date Due    COVID-19 Vaccine (1) Never done    DTaP,Tdap,and Td Vaccines (1 - Tdap) Never done    Influenza Vaccine (1) 09/01/2021      Medicare Health Risk Assessment:     /70   Temp (!) 96 6 °F (35 9 °C) (Temporal)   Ht 4' 9" (1 448 m)   Wt 47 3 kg (104 lb 4 oz)   BMI 22 56 kg/m²      [facility] is here for her Subsequent Wellness visit  Last Medicare Wellness visit information reviewed, patient interviewed and updates made to the record today  Health Risk Assessment:   Patient rates overall health as good  Patient feels that their physical health rating is same  Patient is satisfied with their life  Eyesight was rated as slightly worse  Hearing was rated as same  Patient feels that their emotional and mental health rating is same  Patients states they are never, rarely angry  Patient states they are never, rarely unusually tired/fatigued  Pain experienced in the last 7 days has been none  Patient states that she has experienced no weight loss or gain in last 6 months  Depression Screening:   PHQ-2 Score: 0      Fall Risk Screening:    In the past year, patient has experienced: no history of falling in past year      Urinary Incontinence Screening:   Patient has not leaked urine accidently in the last six months  Home Safety:  Patient has trouble with stairs inside or outside of their home  Patient has working smoke alarms and has working carbon monoxide detector  Home safety hazards include: none  Nutrition:   Current diet is Regular  Medications:   Patient is currently taking over-the-counter supplements  OTC medications include: see medication list  Patient is able to manage medications  Activities of Daily Living (ADLs)/Instrumental Activities of Daily Living (IADLs):   Walk and transfer into and out of bed and chair?: Yes  Dress and groom yourself?: Yes    Bathe or shower yourself?: Yes    Feed yourself? Yes  Do your laundry/housekeeping?: Yes  Manage your money, pay your bills and track your expenses?: Yes  Make your own meals?: Yes    Do your own shopping?: Yes    Previous Hospitalizations:   Any hospitalizations or ED visits within the last 12 months?: No      Advance Care Planning:   Living will: Yes    Durable POA for healthcare:  Yes    Advanced directive: Yes    End of Life Decisions reviewed with patient: Yes    Provider agrees with end of life decisions: Yes      Cognitive Screening:   Provider or family/friend/caregiver concerned regarding cognition?: No    PREVENTIVE SCREENINGS      Cardiovascular Screening:    General: Screening Not Indicated and History Lipid Disorder      Diabetes Screening:     General: Screening Current      Colorectal Cancer Screening:     General: Screening Not Indicated      Breast Cancer Screening:     General: History Breast Cancer      Cervical Cancer Screening:    General: Screening Not Indicated      Osteoporosis Screening:    General: Screening Not Indicated and History Osteoporosis      Abdominal Aortic Aneurysm (AAA) Screening:        General: Screening Current      Lung Cancer Screening:     General: Screening Not Indicated      Hepatitis C Screening:    General: Screening Current    Screening, Brief Intervention, and Referral to Treatment (SBIRT)    Screening  Typical number of drinks in a day: 0  Typical number of drinks in a week: 0  Interpretation: Low risk drinking behavior  AUDIT-C Screenin) How often did you have a drink containing alcohol in the past year? never  2) How many drinks did you have on a typical day when you were drinking in the past year? 0  3) How often did you have 6 or more drinks on one occasion in the past year? never    AUDIT-C Score: 0  Interpretation: Score 0-2 (female): Negative screen for alcohol misuse    Single Item Drug Screening:  How often have you used an illegal drug (including marijuana) or a prescription medication for non-medical reasons in the past year? never    Single Item Drug Screen Score: 0  Interpretation: Negative screen for possible drug use disorder    Brief Intervention  Alcohol & drug use screenings were reviewed  No concerns regarding substance use disorder identified  Other Counseling Topics:   Regular weightbearing exercise and calcium and vitamin D intake         Anselmo Thomas, DO

## 2021-08-18 ENCOUNTER — OFFICE VISIT (OUTPATIENT)
Dept: FAMILY MEDICINE CLINIC | Facility: CLINIC | Age: 86
End: 2021-08-18
Payer: COMMERCIAL

## 2021-08-18 ENCOUNTER — APPOINTMENT (OUTPATIENT)
Dept: RADIOLOGY | Facility: MEDICAL CENTER | Age: 86
End: 2021-08-18
Payer: COMMERCIAL

## 2021-08-18 ENCOUNTER — APPOINTMENT (OUTPATIENT)
Dept: LAB | Facility: MEDICAL CENTER | Age: 86
End: 2021-08-18
Payer: COMMERCIAL

## 2021-08-18 VITALS
HEIGHT: 57 IN | TEMPERATURE: 97.8 F | DIASTOLIC BLOOD PRESSURE: 70 MMHG | SYSTOLIC BLOOD PRESSURE: 118 MMHG | WEIGHT: 100.5 LBS | BODY MASS INDEX: 21.68 KG/M2

## 2021-08-18 DIAGNOSIS — R10.84 GENERALIZED ABDOMINAL PAIN: Primary | ICD-10-CM

## 2021-08-18 DIAGNOSIS — R10.84 GENERALIZED ABDOMINAL PAIN: ICD-10-CM

## 2021-08-18 LAB
ALBUMIN SERPL BCP-MCNC: 3.8 G/DL (ref 3.5–5)
ALP SERPL-CCNC: 124 U/L (ref 46–116)
ALT SERPL W P-5'-P-CCNC: 15 U/L (ref 12–78)
ANION GAP SERPL CALCULATED.3IONS-SCNC: 5 MMOL/L (ref 4–13)
AST SERPL W P-5'-P-CCNC: 17 U/L (ref 5–45)
BASOPHILS # BLD AUTO: 0.05 THOUSANDS/ΜL (ref 0–0.1)
BASOPHILS NFR BLD AUTO: 1 % (ref 0–1)
BILIRUB SERPL-MCNC: 0.37 MG/DL (ref 0.2–1)
BILIRUB UR QL STRIP: NEGATIVE
BUN SERPL-MCNC: 14 MG/DL (ref 5–25)
CALCIUM SERPL-MCNC: 9.2 MG/DL (ref 8.3–10.1)
CHLORIDE SERPL-SCNC: 109 MMOL/L (ref 100–108)
CLARITY UR: CLEAR
CO2 SERPL-SCNC: 24 MMOL/L (ref 21–32)
COLOR UR: YELLOW
CREAT SERPL-MCNC: 1.01 MG/DL (ref 0.6–1.3)
EOSINOPHIL # BLD AUTO: 0.08 THOUSAND/ΜL (ref 0–0.61)
EOSINOPHIL NFR BLD AUTO: 2 % (ref 0–6)
ERYTHROCYTE [DISTWIDTH] IN BLOOD BY AUTOMATED COUNT: 12.1 % (ref 11.6–15.1)
GFR SERPL CREATININE-BSD FRML MDRD: 52 ML/MIN/1.73SQ M
GLUCOSE P FAST SERPL-MCNC: 97 MG/DL (ref 65–99)
GLUCOSE UR STRIP-MCNC: NEGATIVE MG/DL
HCT VFR BLD AUTO: 38.6 % (ref 34.8–46.1)
HGB BLD-MCNC: 12.4 G/DL (ref 11.5–15.4)
HGB UR QL STRIP.AUTO: NEGATIVE
IMM GRANULOCYTES # BLD AUTO: 0.01 THOUSAND/UL (ref 0–0.2)
IMM GRANULOCYTES NFR BLD AUTO: 0 % (ref 0–2)
KETONES UR STRIP-MCNC: NEGATIVE MG/DL
LEUKOCYTE ESTERASE UR QL STRIP: NEGATIVE
LYMPHOCYTES # BLD AUTO: 1.07 THOUSANDS/ΜL (ref 0.6–4.47)
LYMPHOCYTES NFR BLD AUTO: 21 % (ref 14–44)
MCH RBC QN AUTO: 30.5 PG (ref 26.8–34.3)
MCHC RBC AUTO-ENTMCNC: 32.1 G/DL (ref 31.4–37.4)
MCV RBC AUTO: 95 FL (ref 82–98)
MONOCYTES # BLD AUTO: 0.47 THOUSAND/ΜL (ref 0.17–1.22)
MONOCYTES NFR BLD AUTO: 9 % (ref 4–12)
NEUTROPHILS # BLD AUTO: 3.55 THOUSANDS/ΜL (ref 1.85–7.62)
NEUTS SEG NFR BLD AUTO: 67 % (ref 43–75)
NITRITE UR QL STRIP: NEGATIVE
NRBC BLD AUTO-RTO: 0 /100 WBCS
PH UR STRIP.AUTO: 6 [PH]
PLATELET # BLD AUTO: 316 THOUSANDS/UL (ref 149–390)
PMV BLD AUTO: 10.7 FL (ref 8.9–12.7)
POTASSIUM SERPL-SCNC: 4 MMOL/L (ref 3.5–5.3)
PROT SERPL-MCNC: 7.9 G/DL (ref 6.4–8.2)
PROT UR STRIP-MCNC: NEGATIVE MG/DL
RBC # BLD AUTO: 4.06 MILLION/UL (ref 3.81–5.12)
SODIUM SERPL-SCNC: 138 MMOL/L (ref 136–145)
SP GR UR STRIP.AUTO: 1.01 (ref 1–1.03)
UROBILINOGEN UR QL STRIP.AUTO: 0.2 E.U./DL
WBC # BLD AUTO: 5.23 THOUSAND/UL (ref 4.31–10.16)

## 2021-08-18 PROCEDURE — 74022 RADEX COMPL AQT ABD SERIES: CPT

## 2021-08-18 PROCEDURE — 81003 URINALYSIS AUTO W/O SCOPE: CPT | Performed by: INTERNAL MEDICINE

## 2021-08-18 PROCEDURE — 99213 OFFICE O/P EST LOW 20 MIN: CPT | Performed by: INTERNAL MEDICINE

## 2021-08-18 PROCEDURE — 36415 COLL VENOUS BLD VENIPUNCTURE: CPT

## 2021-08-18 PROCEDURE — 85025 COMPLETE CBC W/AUTO DIFF WBC: CPT

## 2021-08-18 PROCEDURE — 80053 COMPREHEN METABOLIC PANEL: CPT

## 2021-08-18 RX ORDER — FAMOTIDINE 20 MG/1
20 TABLET, FILM COATED ORAL DAILY
Qty: 5 TABLET | Refills: 0 | Status: SHIPPED | OUTPATIENT
Start: 2021-08-18 | End: 2021-09-01 | Stop reason: SDUPTHER

## 2021-08-18 RX ORDER — PREDNISONE 10 MG/1
10 TABLET ORAL DAILY
Qty: 5 TABLET | Refills: 0 | Status: SHIPPED | OUTPATIENT
Start: 2021-08-18 | End: 2021-09-01 | Stop reason: ALTCHOICE

## 2021-08-18 NOTE — PROGRESS NOTES
Assessment/Plan:      Diagnoses and all orders for this visit:    Generalized abdominal pain  -     CBC and differential; Future  -     Comprehensive metabolic panel; Future  -     UA (URINE) with reflex to Scope  -     XR abdomen obstruction series; Future  -     predniSONE 10 mg tablet; Take 1 tablet (10 mg total) by mouth daily  -     famotidine (PEPCID) 20 mg tablet; Take 1 tablet (20 mg total) by mouth daily      Followup prn Has GI appt in October as of now - call for cancel list        Patient ID: Ashley Miller is a 82 y o  female  HPI   Pt had grilled ham and cheese and dessert last week and had 24 hours of diarrhea and now lingering abdominal pain Has belching No bm since last week No fever or chills No dizziness or lightheadedness   No falls Has GI appt in October    Review of Systems   Constitutional: Negative for chills and fever  Respiratory: Negative for shortness of breath  Cardiovascular: Negative for chest pain, palpitations and leg swelling  Gastrointestinal: Positive for abdominal pain  Negative for abdominal distention  Belching   Skin: Negative for pallor  Neurological: Negative for dizziness, numbness and headaches  Psychiatric/Behavioral: The patient is not nervous/anxious  Past Medical History:   Diagnosis Date    Breast cancer ([facility] [address] ) 2007    Cellulitis of left upper arm     History of anxiety     History of appendicitis     History of gastroesophageal reflux (GERD)      Past Surgical History:   Procedure Laterality Date    APPENDECTOMY      Date Unknown    CATARACT EXTRACTION Bilateral     Left 2008 // Right 2008     SECTION      Date Unknown    HYSTERECTOMY      MASTECTOMY Right 2007    PARTIAL HYSTERECTOMY      fallopian tube removed, ? side    TUBAL LIGATION      Date Unknown     Social History     Socioeconomic History    Marital status:       Spouse name: Not on file    Number of children: Not on file    Years of education: Not on file    Highest education level: Not on file   Occupational History    Not on file   Tobacco Use    Smoking status: Never Smoker    Smokeless tobacco: Never Used   Vaping Use    Vaping Use: Never used   Substance and Sexual Activity    Alcohol use: No    Drug use: No    Sexual activity: Not on file   Other Topics Concern    Not on file   Social History Narrative    Caffeine Use    Dental Care, Regularly    Good Dental Hygiene    Uses Safety Equipment: Seatbelts         Social Determinants of Health     Financial Resource Strain:     Difficulty of Paying Living Expenses:    Food Insecurity:     Worried About Running Out of Food in the Last Year:     [address] in the Last Year:    Transportation Needs:     Lack of Transportation (Medical):  Lack of Transportation (Non-Medical):    Physical Activity:     Days of Exercise per Week:     Minutes of Exercise per Session:    Stress:     Feeling of Stress :    Social Connections:     Frequency of Communication with Friends and Family:     Frequency of Social Gatherings with Friends and Family:     Attends Protestant Services:     Active Member of Clubs or Organizations:     Attends Club or Organization Meetings:     Marital Status:    Intimate Partner Violence:     Fear of Current or Ex-Partner:     Emotionally Abused:     Physically Abused:     Sexually Abused: Allergies   Allergen Reactions    Medical Tape     Other Itching     Adhesive Tape         Visit Vitals  /70   Temp 97 8 °F (36 6 °C) (Temporal)   Ht 4' 9" (1 448 m)   Wt 45 6 kg (100 lb 8 oz)   BMI 21 75 kg/m²   OB Status Postmenopausal   Smoking Status Never Smoker   BSA 1 34 m²          Physical Exam  Constitutional:       General: She is not in acute distress  Appearance: She is well-developed  She is not ill-appearing, toxic-appearing or diaphoretic  HENT:      Head: Normocephalic and atraumatic     Eyes:      Extraocular Movements: Extraocular movements intact  Pupils: Pupils are equal, round, and reactive to light  Cardiovascular:      Rate and Rhythm: Normal rate  Heart sounds: Normal heart sounds  No murmur heard  Pulmonary:      Effort: Pulmonary effort is normal       Breath sounds: Normal breath sounds  Abdominal:      General: Bowel sounds are normal  There is distension  Palpations: Abdomen is rigid  There is no shifting dullness  Tenderness: There is no guarding  Skin:     General: Skin is dry  Coloration: Skin is pale  Skin is not jaundiced  Neurological:      Mental Status: She is alert

## 2021-08-19 DIAGNOSIS — R10.84 GENERALIZED ABDOMINAL PAIN: Primary | ICD-10-CM

## 2021-08-19 RX ORDER — DICYCLOMINE HYDROCHLORIDE 10 MG/1
10 CAPSULE ORAL 2 TIMES DAILY PRN
Qty: 20 CAPSULE | Refills: 0 | Status: SHIPPED | OUTPATIENT
Start: 2021-08-19 | End: 2021-09-01 | Stop reason: SDUPTHER

## 2021-08-19 NOTE — TELEPHONE ENCOUNTER
----- Message from Ed Gill DO sent at 8/19/2021  6:05 AM EDT -----  Labs wnl  Can take probiotic and rx Bentyl 10mg every 12 hours as needed for bloating or camps #20 no refill

## 2021-08-23 ENCOUNTER — DOCTOR'S OFFICE (OUTPATIENT)
Dept: URBAN - NONMETROPOLITAN AREA CLINIC 1 | Facility: CLINIC | Age: 86
Setting detail: OPHTHALMOLOGY
End: 2021-08-23
Payer: MEDICARE

## 2021-08-23 ENCOUNTER — RX ONLY (RX ONLY)
Age: 86
End: 2021-08-23

## 2021-08-23 DIAGNOSIS — H04.122: ICD-10-CM

## 2021-08-23 DIAGNOSIS — H40.1111: ICD-10-CM

## 2021-08-23 DIAGNOSIS — H04.121: ICD-10-CM

## 2021-08-23 DIAGNOSIS — H40.1122: ICD-10-CM

## 2021-08-23 DIAGNOSIS — Z96.1: ICD-10-CM

## 2021-08-23 PROCEDURE — 92133 CPTRZD OPH DX IMG PST SGM ON: CPT | Performed by: OPTOMETRIST

## 2021-08-23 PROCEDURE — 92014 COMPRE OPH EXAM EST PT 1/>: CPT | Performed by: OPTOMETRIST

## 2021-08-23 ASSESSMENT — PACHYMETRY
OS_CT_CORRECTION: 0
OS_CT_UM: 541
OD_CT_CORRECTION: 0
OD_CT_UM: 540

## 2021-08-23 ASSESSMENT — REFRACTION_CURRENTRX
OS_CYLINDER: -1.75
OD_CYLINDER: 0.00
OD_OVR_VA: 20/
OD_SPHERE: -1.50
OS_AXIS: 018
OD_AXIS: 181
OS_SPHERE: -2.50
OS_OVR_VA: 20/

## 2021-08-23 ASSESSMENT — REFRACTION_AUTOREFRACTION
OS_CYLINDER: -4.25
OD_SPHERE: -0.25
OS_AXIS: 076
OD_CYLINDER: -4.00
OD_AXIS: 115
OS_SPHERE: -1.00

## 2021-08-23 ASSESSMENT — CONFRONTATIONAL VISUAL FIELD TEST (CVF)
OD_FINDINGS: FULL
OS_FINDINGS: FULL

## 2021-08-23 ASSESSMENT — TONOMETRY
OD_IOP_MMHG: 18
OS_IOP_MMHG: 20

## 2021-08-23 ASSESSMENT — VISUAL ACUITY
OS_BCVA: 20/40+2
OD_BCVA: 20/40+2

## 2021-08-23 ASSESSMENT — SPHEQUIV_DERIVED
OD_SPHEQUIV: -2.25
OS_SPHEQUIV: -3.125

## 2021-08-27 ENCOUNTER — TELEPHONE (OUTPATIENT)
Dept: FAMILY MEDICINE CLINIC | Facility: CLINIC | Age: 86
End: 2021-08-27

## 2021-08-27 ENCOUNTER — HOSPITAL ENCOUNTER (EMERGENCY)
Facility: HOSPITAL | Age: 86
Discharge: HOME | End: 2021-08-27
Attending: EMERGENCY MEDICINE
Payer: COMMERCIAL

## 2021-08-27 ENCOUNTER — APPOINTMENT (EMERGENCY)
Dept: CT IMAGING | Facility: HOSPITAL | Age: 86
End: 2021-08-27
Payer: COMMERCIAL

## 2021-08-27 VITALS
WEIGHT: 101 LBS | HEIGHT: 57 IN | OXYGEN SATURATION: 95 % | HEART RATE: 65 BPM | TEMPERATURE: 98.6 F | DIASTOLIC BLOOD PRESSURE: 70 MMHG | RESPIRATION RATE: 15 BRPM | SYSTOLIC BLOOD PRESSURE: 161 MMHG | BODY MASS INDEX: 21.79 KG/M2

## 2021-08-27 DIAGNOSIS — R10.9 ABDOMINAL PAIN: Primary | ICD-10-CM

## 2021-08-27 DIAGNOSIS — K57.90 DIVERTICULOSIS: ICD-10-CM

## 2021-08-27 LAB
ALBUMIN SERPL BCP-MCNC: 3.9 G/DL (ref 3.5–5)
ALP SERPL-CCNC: 130 U/L (ref 46–116)
ALT SERPL W P-5'-P-CCNC: 18 U/L (ref 12–78)
ANION GAP SERPL CALCULATED.3IONS-SCNC: 10 MMOL/L (ref 4–13)
AST SERPL W P-5'-P-CCNC: 15 U/L (ref 5–45)
BASOPHILS # BLD AUTO: 0.03 THOUSANDS/ΜL (ref 0–0.1)
BASOPHILS NFR BLD AUTO: 1 % (ref 0–1)
BILIRUB SERPL-MCNC: 0.38 MG/DL (ref 0.2–1)
BILIRUB UR QL STRIP: NEGATIVE
BUN SERPL-MCNC: 18 MG/DL (ref 5–25)
CALCIUM SERPL-MCNC: 9.2 MG/DL (ref 8.3–10.1)
CHLORIDE SERPL-SCNC: 106 MMOL/L (ref 100–108)
CLARITY UR: CLEAR
CO2 SERPL-SCNC: 25 MMOL/L (ref 21–32)
COLOR UR: NORMAL
CREAT SERPL-MCNC: 1.09 MG/DL (ref 0.6–1.3)
EOSINOPHIL # BLD AUTO: 0.06 THOUSAND/ΜL (ref 0–0.61)
EOSINOPHIL NFR BLD AUTO: 1 % (ref 0–6)
ERYTHROCYTE [DISTWIDTH] IN BLOOD BY AUTOMATED COUNT: 12.2 % (ref 11.6–15.1)
GFR SERPL CREATININE-BSD FRML MDRD: 47 ML/MIN/1.73SQ M
GLUCOSE SERPL-MCNC: 101 MG/DL (ref 65–140)
GLUCOSE UR STRIP-MCNC: NEGATIVE MG/DL
HCT VFR BLD AUTO: 39.2 % (ref 34.8–46.1)
HGB BLD-MCNC: 12.6 G/DL (ref 11.5–15.4)
HGB UR QL STRIP.AUTO: NEGATIVE
IMM GRANULOCYTES # BLD AUTO: 0.01 THOUSAND/UL (ref 0–0.2)
IMM GRANULOCYTES NFR BLD AUTO: 0 % (ref 0–2)
KETONES UR STRIP-MCNC: NEGATIVE MG/DL
LACTATE SERPL-SCNC: 1.1 MMOL/L (ref 0.5–2)
LEUKOCYTE ESTERASE UR QL STRIP: NEGATIVE
LIPASE SERPL-CCNC: 133 U/L (ref 73–393)
LYMPHOCYTES # BLD AUTO: 1.15 THOUSANDS/ΜL (ref 0.6–4.47)
LYMPHOCYTES NFR BLD AUTO: 21 % (ref 14–44)
MCH RBC QN AUTO: 30.4 PG (ref 26.8–34.3)
MCHC RBC AUTO-ENTMCNC: 32.1 G/DL (ref 31.4–37.4)
MCV RBC AUTO: 95 FL (ref 82–98)
MONOCYTES # BLD AUTO: 0.43 THOUSAND/ΜL (ref 0.17–1.22)
MONOCYTES NFR BLD AUTO: 8 % (ref 4–12)
NEUTROPHILS # BLD AUTO: 3.79 THOUSANDS/ΜL (ref 1.85–7.62)
NEUTS SEG NFR BLD AUTO: 69 % (ref 43–75)
NITRITE UR QL STRIP: NEGATIVE
NRBC BLD AUTO-RTO: 0 /100 WBCS
PH UR STRIP.AUTO: 5 [PH]
PLATELET # BLD AUTO: 272 THOUSANDS/UL (ref 149–390)
PMV BLD AUTO: 10 FL (ref 8.9–12.7)
POTASSIUM SERPL-SCNC: 4.2 MMOL/L (ref 3.5–5.3)
PROT SERPL-MCNC: 7.6 G/DL (ref 6.4–8.2)
PROT UR STRIP-MCNC: NEGATIVE MG/DL
RBC # BLD AUTO: 4.15 MILLION/UL (ref 3.81–5.12)
SODIUM SERPL-SCNC: 141 MMOL/L (ref 136–145)
SP GR UR STRIP.AUTO: 1.01 (ref 1–1.03)
UROBILINOGEN UR QL STRIP.AUTO: 0.2 E.U./DL
WBC # BLD AUTO: 5.47 THOUSAND/UL (ref 4.31–10.16)

## 2021-08-27 PROCEDURE — 85025 COMPLETE CBC W/AUTO DIFF WBC: CPT | Performed by: PHYSICIAN ASSISTANT

## 2021-08-27 PROCEDURE — 99284 EMERGENCY DEPT VISIT MOD MDM: CPT | Performed by: PHYSICIAN ASSISTANT

## 2021-08-27 PROCEDURE — 96374 THER/PROPH/DIAG INJ IV PUSH: CPT

## 2021-08-27 PROCEDURE — 36415 COLL VENOUS BLD VENIPUNCTURE: CPT | Performed by: PHYSICIAN ASSISTANT

## 2021-08-27 PROCEDURE — G1004 CDSM NDSC: HCPCS

## 2021-08-27 PROCEDURE — 74177 CT ABD & PELVIS W/CONTRAST: CPT

## 2021-08-27 PROCEDURE — 96361 HYDRATE IV INFUSION ADD-ON: CPT

## 2021-08-27 PROCEDURE — 80053 COMPREHEN METABOLIC PANEL: CPT | Performed by: PHYSICIAN ASSISTANT

## 2021-08-27 PROCEDURE — 83690 ASSAY OF LIPASE: CPT | Performed by: PHYSICIAN ASSISTANT

## 2021-08-27 PROCEDURE — 81003 URINALYSIS AUTO W/O SCOPE: CPT | Performed by: PHYSICIAN ASSISTANT

## 2021-08-27 PROCEDURE — 99284 EMERGENCY DEPT VISIT MOD MDM: CPT

## 2021-08-27 PROCEDURE — 83605 ASSAY OF LACTIC ACID: CPT | Performed by: PHYSICIAN ASSISTANT

## 2021-08-27 RX ORDER — ONDANSETRON 2 MG/ML
4 INJECTION INTRAMUSCULAR; INTRAVENOUS ONCE
Status: COMPLETED | OUTPATIENT
Start: 2021-08-27 | End: 2021-08-27

## 2021-08-27 RX ORDER — DICYCLOMINE HCL 20 MG
20 TABLET ORAL 2 TIMES DAILY
Qty: 20 TABLET | Refills: 0 | Status: SHIPPED | OUTPATIENT
Start: 2021-08-27

## 2021-08-27 RX ADMIN — IOHEXOL 100 ML: 350 INJECTION, SOLUTION INTRAVENOUS at 12:36

## 2021-08-27 RX ADMIN — SODIUM CHLORIDE 1000 ML: 0.9 INJECTION, SOLUTION INTRAVENOUS at 11:46

## 2021-08-27 RX ADMIN — ONDANSETRON 4 MG: 2 INJECTION INTRAMUSCULAR; INTRAVENOUS at 11:46

## 2021-08-27 NOTE — TELEPHONE ENCOUNTER
I already ordered the bentyl she really needs to go to ER to see if colitis recurring and that would be thru CT scan

## 2021-08-27 NOTE — TELEPHONE ENCOUNTER
Daughter called to state her mom is still c/o lower abd pain  Unable to eat much  Not moving her bowels, last BM 5 days ago  Drinking fluids  C/o nausea as well, denies vomiting  Afebrile

## 2021-08-27 NOTE — ED NOTES
Patient ambulating to bathroom at this time     Lilli Wing [facility]  08/27/21 9841

## 2021-08-27 NOTE — DISCHARGE INSTRUCTIONS
BOWEL REGIMEN    MiraLAX (Polyethylene)  ----------------  3 times a day for 3 days, then once daily  Colace (docusate sodium) 100 mg ----1 pill twice a day  Citrucel (methylcellulose) ---------------- As directed on the bottle  Enemas or suppositories  ----------------ONLY As Directed on the package  Drink plenty of water    If you have been taking   narcotic pain medication you should add  Senokot: --------------------------------------2 tablets at bedtime

## 2021-08-27 NOTE — ED PROVIDER NOTES
History  Chief Complaint   Patient presents with    Abdominal Pain     bilateral lower abdominal pain started few days ago with nausea     Patient presents to the emergency department today via private vehicle with her daughter  She is a very pleasant 82-year-old female complains of lower abdominal pain  Pain actually commenced 17 days ago, bilateral low abdomen associated with some nausea  She has some which she believes concentrated urine which is foul-smelling  She denies urinary urgency frequency or burning however  She also admits upon review of systems questioning to left lower back pain as well  Patient's pain is primarily concentrated in the left lower quadrant of the abdomen  Patient did see primary care at the onset of the symptoms had an obstruction series which did not note any acute abnormalities a laboratory workup which included CBC CMP and urine which was normal as well she was started on Pepcid  She states about 2 years ago and this was confirmed via chart review 2019 CT scan here noted proctocolitis, she was treated with Cipro, 2 month follow-up with what sounds to be a flexible sigmoidoscopy revealed resolution of the symptoms  This is potentially a exacerbation this condition  She is well-appearing at bedside  Decreased oral intake is taking liquids does feel nauseous no vomiting  Past surgical history positive for mastectomy as well  section  Prior to Admission Medications   Prescriptions Last Dose Informant Patient Reported? Taking?    Docusate Sodium (COLACE PO)   Yes No   Sig: Take by mouth   Probiotic Product (PROBIOTIC DAILY PO)  Self Yes No   Sig: Take by mouth   bimatoprost (LUMIGAN) 0 01 % ophthalmic drops   Yes No   Sig: Administer 1 drop to both eyes daily at bedtime   citalopram (CeleXA) 10 mg tablet   No No   Sig: Take 1 tablet (10 mg total) by mouth daily   Patient not taking: Reported on 2021   dicyclomine (BENTYL) 10 mg capsule   No No   Sig: Take 1 capsule (10 mg total) by mouth 2 (two) times a day as needed (bloating)   dorzolamide (TRUSOPT) 2 % ophthalmic solution   Yes No   Sig: Administer 1 drop to both eyes    famotidine (PEPCID) 20 mg tablet   No No   Sig: Take 1 tablet (20 mg total) by mouth daily   predniSONE 10 mg tablet   No No   Sig: Take 1 tablet (10 mg total) by mouth daily   promethazine-codeine (PHENERGAN WITH CODEINE) 6 25-10 mg/5 mL syrup   No No   Sig: Take 5 mL by mouth every 6 (six) hours as needed for cough      Facility-Administered Medications: None       Past Medical History:   Diagnosis Date    Breast cancer ([facility] [address] ) 2007    Cellulitis of left upper arm     History of anxiety     History of appendicitis     History of gastroesophageal reflux (GERD)        Past Surgical History:   Procedure Laterality Date    APPENDECTOMY      Date Unknown    CATARACT EXTRACTION Bilateral     Left 2008 // Right 2008     SECTION      Date Unknown    HYSTERECTOMY      MASTECTOMY Right 2007    PARTIAL HYSTERECTOMY      fallopian tube removed, ? side    TUBAL LIGATION      Date Unknown       Family History   Problem Relation Age of Onset    Lung cancer Mother     Colon cancer Sister     Hypertension Family     No Known Problems Father     No Known Problems Daughter     No Known Problems Maternal Grandmother     No Known Problems Maternal Grandfather     No Known Problems Paternal Grandmother     No Known Problems Paternal Grandfather     No Known Problems Sister     Drug abuse Neg Hx      I have reviewed and agree with the history as documented      E-Cigarette/Vaping    E-Cigarette Use Never User      E-Cigarette/Vaping Substances    Nicotine No     THC No     CBD No     Flavoring No     Other No     Unknown No      Social History     Tobacco Use    Smoking status: Never Smoker    Smokeless tobacco: Never Used   Vaping Use    Vaping Use: Never used   Substance Use Topics    Alcohol use: No    Drug use: No       Review of Systems   Constitutional: Negative  Negative for chills and fever  HENT: Negative  Negative for sore throat and trouble swallowing  Eyes: Negative  Respiratory: Negative  Negative for cough, shortness of breath and wheezing  Cardiovascular: Negative  Negative for chest pain and leg swelling  Gastrointestinal: Positive for abdominal pain and nausea  Negative for blood in stool and vomiting  Endocrine: Negative  Genitourinary: Negative  Musculoskeletal: Negative  Negative for neck stiffness  Skin: Negative  Allergic/Immunologic: Negative  Neurological: Negative  Negative for dizziness, seizures, speech difficulty, weakness, light-headedness, numbness and headaches  Hematological: Negative  Psychiatric/Behavioral: Negative  All other systems reviewed and are negative  Physical Exam  Physical Exam  Vitals reviewed  Constitutional:       General: She is not in acute distress  Appearance: She is well-developed  She is not ill-appearing or diaphoretic  HENT:      Head: Normocephalic and atraumatic  Right Ear: External ear normal  No swelling  Tympanic membrane is not bulging  Left Ear: External ear normal  No swelling  Tympanic membrane is not bulging  Nose: Nose normal       Mouth/Throat:      Pharynx: No oropharyngeal exudate  Eyes:      General: Lids are normal       Conjunctiva/sclera: Conjunctivae normal       Pupils: Pupils are equal, round, and reactive to light  Neck:      Thyroid: No thyromegaly  Vascular: No JVD  Trachea: No tracheal deviation  Cardiovascular:      Rate and Rhythm: Normal rate and regular rhythm  Pulses: Normal pulses  Heart sounds: Normal heart sounds  No murmur heard  No friction rub  No gallop  Pulmonary:      Effort: Pulmonary effort is normal  No respiratory distress  Breath sounds: Normal breath sounds  No stridor  No wheezing or rales     Chest: Chest wall: No tenderness  Abdominal:      General: Bowel sounds are normal  There is no distension or abdominal bruit  There are no signs of injury  Palpations: Abdomen is soft  There is no mass  Tenderness: There is abdominal tenderness in the right lower quadrant and left lower quadrant  There is no right CVA tenderness, guarding or rebound  Negative signs include Worthy's sign, Rovsing's sign, McBurney's sign, psoas sign and obturator sign  Hernia: No hernia is present  There is no hernia in the umbilical area, ventral area, left inguinal area, right femoral area, left femoral area or right inguinal area  Musculoskeletal:         General: No tenderness  Normal range of motion  Cervical back: Normal range of motion and neck supple  No edema  Normal range of motion  Lymphadenopathy:      Cervical: No cervical adenopathy  Skin:     General: Skin is warm and dry  Capillary Refill: Capillary refill takes less than 2 seconds  Coloration: Skin is not pale  Findings: No erythema or rash  Neurological:      Mental Status: She is alert and oriented to person, place, and time  GCS: GCS eye subscore is 4  GCS verbal subscore is 5  GCS motor subscore is 6  Cranial Nerves: No cranial nerve deficit  Sensory: No sensory deficit  Deep Tendon Reflexes: Reflexes are normal and symmetric     Psychiatric:         Speech: Speech normal          Behavior: Behavior normal          Vital Signs  ED Triage Vitals   Temperature Pulse Respirations Blood Pressure SpO2   08/27/21 1101 08/27/21 1101 08/27/21 1101 08/27/21 1400 08/27/21 1101   98 6 °F (37 °C) 68 18 161/70 98 %      Temp Source Heart Rate Source Patient Position - Orthostatic VS BP Location FiO2 (%)   08/27/21 1101 08/27/21 1101 08/27/21 1101 08/27/21 1101 --   Temporal Monitor Sitting Right arm       Pain Score       08/27/21 1101       5           Vitals:    08/27/21 1101 08/27/21 1400   BP:  161/70   Pulse: 68 65   Patient Position - Orthostatic VS: Sitting Sitting         Visual Acuity      ED Medications  Medications   sodium chloride 0 9 % bolus 1,000 mL (0 mL Intravenous Stopped 8/27/21 1246)   ondansetron (ZOFRAN) injection 4 mg (4 mg Intravenous Given 8/27/21 1146)   iohexol (OMNIPAQUE) 350 MG/ML injection (SINGLE-DOSE) 100 mL (100 mL Intravenous Given 8/27/21 1236)       Diagnostic Studies  Results Reviewed     Procedure Component Value Units Date/Time    Lactic acid [[id number]]  (Normal) Collected: 08/27/21 1144    Lab Status: Final result Specimen: Blood from Arm, Left Updated: 08/27/21 1213     LACTIC ACID 1 1 mmol/L     Narrative:      Result may be elevated if tourniquet was used during collection      Comprehensive metabolic panel [[id number]]  (Abnormal) Collected: 08/27/21 1144    Lab Status: Final result Specimen: Blood from Arm, Left Updated: 08/27/21 1211     Sodium 141 mmol/L      Potassium 4 2 mmol/L      Chloride 106 mmol/L      CO2 25 mmol/L      ANION GAP 10 mmol/L      BUN 18 mg/dL      Creatinine 1 09 mg/dL      Glucose 101 mg/dL      Calcium 9 2 mg/dL      AST 15 U/L      ALT 18 U/L      Alkaline Phosphatase 130 U/L      Total Protein 7 6 g/dL      Albumin 3 9 g/dL      Total Bilirubin 0 38 mg/dL      eGFR 47 ml/min/1 73sq m     Narrative:      [place] guidelines for Chronic Kidney Disease (CKD):     Stage 1 with normal or high GFR (GFR > 90 mL/min/1 73 square meters)    Stage 2 Mild CKD (GFR = 60-89 mL/min/1 73 square meters)    Stage 3A Moderate CKD (GFR = 45-59 mL/min/1 73 square meters)    Stage 3B Moderate CKD (GFR = 30-44 mL/min/1 73 square meters)    Stage 4 Severe CKD (GFR = 15-29 mL/min/1 73 square meters)    Stage 5 End Stage CKD (GFR <15 mL/min/1 73 square meters)  Note: GFR calculation is accurate only with a steady state creatinine    Lipase [[id number]]  (Normal) Collected: 08/27/21 1144    Lab Status: Final result Specimen: Blood from Arm, Left Updated: 08/27/21 1206     Lipase 133 u/L     UA (URINE) with reflex to Scope [[id number]] Collected: 08/27/21 1144    Lab Status: Final result Specimen: Urine, Clean Catch Updated: 08/27/21 1158     Color, UA Light Yellow     Clarity, UA Clear     Specific Gravity, UA 1 010     pH, UA 5 0     Leukocytes, UA Negative     Nitrite, UA Negative     Protein, UA Negative mg/dl      Glucose, UA Negative mg/dl      Ketones, UA Negative mg/dl      Urobilinogen, UA 0 2 E U /dl      Bilirubin, UA Negative     Blood, UA Negative    CBC and differential [[id number]] Collected: 08/27/21 1144    Lab Status: Final result Specimen: Blood from Arm, Left Updated: 08/27/21 1155     WBC 5 47 Thousand/uL      RBC 4 15 Million/uL      Hemoglobin 12 6 g/dL      Hematocrit 39 2 %      MCV 95 fL      MCH 30 4 pg      MCHC 32 1 g/dL      RDW 12 2 %      MPV 10 0 fL      Platelets 327 Thousands/uL      nRBC 0 /100 WBCs      Neutrophils Relative 69 %      Immat GRANS % 0 %      Lymphocytes Relative 21 %      Monocytes Relative 8 %      Eosinophils Relative 1 %      Basophils Relative 1 %      Neutrophils Absolute 3 79 Thousands/µL      Immature Grans Absolute 0 01 Thousand/uL      Lymphocytes Absolute 1 15 Thousands/µL      Monocytes Absolute 0 43 Thousand/µL      Eosinophils Absolute 0 06 Thousand/µL      Basophils Absolute 0 03 Thousands/µL                  CT abdomen pelvis with contrast   Final Result by Sudarshan Deleon MD (08/27 1425)      No acute pathology  Colonic diverticulosis without evidence of acute diverticulitis              Workstation performed: [id number]                    Procedures  Procedures         ED Course  ED Course as of Aug 27 1448   Fri Aug 27, 2021   1116 Temperature: 98 6 °F (37 °C)   1117 Pulse: 68   1117 Respirations: 18   1117 SpO2: 98 %   1205 Blood, UA: Negative   1205 Bilirubin, UA: Negative   1205 Clarity, UA: Clear   1205 Color, UA: Light Yellow   1205 WBC: 5 47   1205 Hemoglobin: 12 6   1205 Platelet Count: 272   1206 Lipase: 133   1218 LACTIC ACID: 1 1   1218 Sodium: 141   1218 Chloride: 106   1218 TOTAL BILIRUBIN: 0 38   1218 eGFR: 47   1218 Awaiting ct abdomen      1245 Awaiting ct intrp      1423 Still awaiting CT interp      1435 IMPRESSION:     No acute pathology  Colonic diverticulosis without evidence of acute diverticulitis  SBIRT 20yo+      Most Recent Value   SBIRT (22 yo +)   In order to provide better care to our patients, we are screening all of our patients for alcohol and drug use  Would it be okay to ask you these screening questions? Yes Filed at: 08/27/2021 1107   Initial Alcohol Screen: US AUDIT-C    1  How often do you have a drink containing alcohol?  0 Filed at: 08/27/2021 1107   2  How many drinks containing alcohol do you have on a typical day you are drinking? 0 Filed at: 08/27/2021 1107   3a  Male UNDER 65: How often do you have five or more drinks on one occasion? 0 Filed at: 08/27/2021 1107   3b  FEMALE Any Age, or MALE 65+: How often do you have 4 or more drinks on one occassion? 0 Filed at: 08/27/2021 1107   Audit-C Score  0 Filed at: 08/27/2021 1107   JOHANA: How many times in the past year have you    Used an illegal drug or used a prescription medication for non-medical reasons? Never Filed at: 08/27/2021 1107                    MDM    Disposition  Final diagnoses:   Abdominal pain   Diverticulosis     Time reflects when diagnosis was documented in both MDM as applicable and the Disposition within this note     Time User Action Codes Description Comment    8/27/2021  2:46 PM Karin RODNEY Add [R10 9] Abdominal pain     8/27/2021  2:46 PM Chris Tenorio Add [K57 90] Diverticulosis       ED Disposition     ED Disposition Condition Date/Time Comment    Discharge Stable Fri Aug 27, 2021  2:46 PM Satya Lowery discharge to home/self care              Follow-up Information     Follow up With Specialties Details Why [address] [place] [address] Luigi Joseph, [place] [facility] Schedule an appointment as soon as possible for a visit  As needed [address]  Kyara Mar Saqib 1490 [facility] 226      Lyudmila Ballard MD Gastroenterology   [address], [place] [facility]   [address] [address]  629 [facility]  [phone]            Patient's Medications   Discharge Prescriptions    DICYCLOMINE (BENTYL) 20 MG TABLET    Take 1 tablet (20 mg total) by mouth 2 (two) times a day       Start Date: 8/27/2021 End Date: --       Order Dose: 20 mg       Quantity: 20 tablet    Refills: 0         PDMP Review       Value Time User    PDMP Reviewed  Yes 2/26/2021  3:35 PM Annette Dooley DO          ED Provider  Electronically Signed by           Moy Crowe PA-C  08/27/21 3147

## 2021-08-30 ENCOUNTER — TELEPHONE (OUTPATIENT)
Dept: FAMILY MEDICINE CLINIC | Facility: CLINIC | Age: 86
End: 2021-08-30

## 2021-08-30 NOTE — TELEPHONE ENCOUNTER
Beth Dukes was sent to the ER the other day for testing  She called and is still not feeling any better  She would like and appt for sometime this week to discuss with you      Please advise  Thank you

## 2021-08-30 NOTE — TELEPHONE ENCOUNTER
Joan Nicolas did not schedule with any GI yet, she wanted to discuss it with you  She did want the 9/1 appt with you to do so

## 2021-08-30 NOTE — TELEPHONE ENCOUNTER
Can offer Wednesday at 1:15 but she really needs GI followup which I see referral placed to [facility] from ER but last visit here she told me she had appt somewhere - please check status of that and if she plans to follow with [facility] instead I will reach out to them to try and coordinate

## 2021-08-31 ENCOUNTER — RA CDI HCC (OUTPATIENT)
Dept: OTHER | Facility: HOSPITAL | Age: 86
End: 2021-08-31

## 2021-08-31 NOTE — PROGRESS NOTES
[facility]  coding opportunities       Chart reviewed, no opportunity found: CHART REVIEWED, NO OPPORTUNITY FOUND                        Patients insurance company: [facility] [address]  (Medicare Advantage and Commercial)

## 2021-09-01 ENCOUNTER — OFFICE VISIT (OUTPATIENT)
Dept: FAMILY MEDICINE CLINIC | Facility: CLINIC | Age: 86
End: 2021-09-01
Payer: COMMERCIAL

## 2021-09-01 VITALS
SYSTOLIC BLOOD PRESSURE: 124 MMHG | WEIGHT: 100.13 LBS | TEMPERATURE: 98.2 F | BODY MASS INDEX: 21.6 KG/M2 | HEIGHT: 57 IN | DIASTOLIC BLOOD PRESSURE: 78 MMHG

## 2021-09-01 DIAGNOSIS — R10.84 GENERALIZED ABDOMINAL PAIN: Primary | ICD-10-CM

## 2021-09-01 PROCEDURE — 1036F TOBACCO NON-USER: CPT | Performed by: INTERNAL MEDICINE

## 2021-09-01 PROCEDURE — 1160F RVW MEDS BY RX/DR IN RCRD: CPT | Performed by: INTERNAL MEDICINE

## 2021-09-01 PROCEDURE — 99214 OFFICE O/P EST MOD 30 MIN: CPT | Performed by: INTERNAL MEDICINE

## 2021-09-01 RX ORDER — FAMOTIDINE 20 MG/1
20 TABLET, FILM COATED ORAL DAILY
Qty: 30 TABLET | Refills: 0 | Status: SHIPPED | OUTPATIENT
Start: 2021-09-01

## 2021-09-01 NOTE — PROGRESS NOTES
Assessment/Plan:         Diagnoses and all orders for this visit:    Generalized abdominal pain  -     Ambulatory referral to Gastroenterology; Future Will reachout to Dr Alana Tsai for followup  Start cittacel Increase water Gluten free diet  Bentyl prn   Colace now can be prn wince loose stools  -     famotidine (PEPCID) 20 mg tablet; Take 1 tablet (20 mg total) by mouth daily    Rto prn        Patient ID: Broderick Billingsley is a 82 y o  female  HPI   Seen in R CT negative for acute issue She is starting to feel better and bowels are moving She is eating but her diet is not great to begin with She did not use miralax but bowels moving with colace She did not start fiber yet Has some upper abdominal queasiness She finished pepcid and steroid No melena No vomiting No fever or chills         Review of Systems   Constitutional: Negative for activity change, chills and fever  Eyes: Negative for visual disturbance  Respiratory: Negative for shortness of breath  Cardiovascular: Negative for chest pain, palpitations and leg swelling  Gastrointestinal: Positive for abdominal distention and abdominal pain  Genitourinary: Negative for difficulty urinating  Neurological: Negative for dizziness, light-headedness and headaches  Psychiatric/Behavioral: Negative for sleep disturbance  The patient is not nervous/anxious          Past Medical History:   Diagnosis Date    Breast cancer ([facility] [address] ) 2007    Cellulitis of left upper arm     History of anxiety     History of appendicitis     History of gastroesophageal reflux (GERD)      Past Surgical History:   Procedure Laterality Date    APPENDECTOMY      Date Unknown    CATARACT EXTRACTION Bilateral     Left 2008 // Right 2008     SECTION      Date Unknown    HYSTERECTOMY      MASTECTOMY Right 2007    PARTIAL HYSTERECTOMY      fallopian tube removed, ? side    TUBAL LIGATION      Date Unknown     Social History     Socioeconomic History  Marital status:      Spouse name: Not on file    Number of children: Not on file    Years of education: Not on file    Highest education level: Not on file   Occupational History    Not on file   Tobacco Use    Smoking status: Never Smoker    Smokeless tobacco: Never Used   Vaping Use    Vaping Use: Never used   Substance and Sexual Activity    Alcohol use: No    Drug use: No    Sexual activity: Not on file   Other Topics Concern    Not on file   Social History Narrative    Caffeine Use    Dental Care, Regularly    Good Dental Hygiene    Uses Safety Equipment: Seatbelts         Social Determinants of Health     Financial Resource Strain:     Difficulty of Paying Living Expenses:    Food Insecurity:     Worried About Running Out of Food in the Last Year:     [address] in the Last Year:    Transportation Needs:     Lack of Transportation (Medical):  Lack of Transportation (Non-Medical):    Physical Activity:     Days of Exercise per Week:     Minutes of Exercise per Session:    Stress:     Feeling of Stress :    Social Connections:     Frequency of Communication with Friends and Family:     Frequency of Social Gatherings with Friends and Family:     Attends Rastafarian Services:     Active Member of Clubs or Organizations:     Attends Club or Organization Meetings:     Marital Status:    Intimate Partner Violence:     Fear of Current or Ex-Partner:     Emotionally Abused:     Physically Abused:     Sexually Abused: Allergies   Allergen Reactions    Medical Tape     Other Itching     Adhesive Tape            /78   Temp 98 2 °F (36 8 °C) (Temporal)   Ht 4' 9" (1 448 m)   Wt 45 4 kg (100 lb 2 oz)   BMI 21 67 kg/m²          Physical Exam  Vitals reviewed  Constitutional:       General: She is not in acute distress  Appearance: Normal appearance  She is not ill-appearing, toxic-appearing or diaphoretic     HENT:      Head: Normocephalic and atraumatic  Right Ear: Tympanic membrane, ear canal and external ear normal  There is no impacted cerumen  Left Ear: Tympanic membrane, ear canal and external ear normal  There is no impacted cerumen  Nose: Nose normal       Mouth/Throat:      Mouth: Mucous membranes are dry  Eyes:      General: No scleral icterus  Extraocular Movements: Extraocular movements intact  Conjunctiva/sclera: Conjunctivae normal       Pupils: Pupils are equal, round, and reactive to light  Cardiovascular:      Rate and Rhythm: Normal rate and regular rhythm  Pulses: Normal pulses  Heart sounds: Normal heart sounds  Pulmonary:      Effort: Pulmonary effort is normal  No respiratory distress  Breath sounds: Normal breath sounds  No wheezing  Abdominal:      General: Bowel sounds are normal  There is no distension  Palpations: Abdomen is soft  Tenderness: There is no abdominal tenderness  Skin:     General: Skin is dry  Coloration: Skin is not jaundiced or pale  Neurological:      General: No focal deficit present  Mental Status: She is alert and oriented to person, place, and time  Mental status is at baseline  Psychiatric:         Mood and Affect: Mood normal          Behavior: Behavior normal          Thought Content:  Thought content normal          Judgment: Judgment normal

## 2021-09-02 ENCOUNTER — TELEPHONE (OUTPATIENT)
Dept: GASTROENTEROLOGY | Facility: CLINIC | Age: 86
End: 2021-09-02

## 2021-09-02 NOTE — TELEPHONE ENCOUNTER
----- Message from Annette Dooley DO sent at 9/2/2021 12:06 PM EDT -----  Regarding: referral  Good Morning! I was hoping you could evaluate this patient for me  She has a hx in past of colitis and was told celiac disease (she saw GI out of the network a couple years ago)  She had a "flare" about 2-3weeks but it was incited by heavy load of foods she generally does not eat and technically should not  I saw her and gave Bentyl and subsequently short course of prednisone  She subsequently went to ER with pain and CT was unremarkable for acute infection  Her sxs have improved and initially part was due to constipation which is a bit better  I saw her again yesterday and although improving she still has unclear discomfort and seemed now more upper abdominal  I put her on pepcid and she is using stool softener/ bentyl prn now  She needs reeval as it seems unclear exact underlying dx  I did mention she may need egd/colon (had lower endoscopy by Kari James 2018) which I hope she would agree to if you feel warranted  I would appreciate your input as I think IBS, possible celiac but her sxs have changed each visit so not exactly sure      Flor Willett

## 2021-09-02 NOTE — TELEPHONE ENCOUNTER
Hi,    Is it possible to schedule an office visit with her sometime within the next 1-2 weeks?     Thank you

## 2021-09-08 ENCOUNTER — OFFICE VISIT (OUTPATIENT)
Dept: GASTROENTEROLOGY | Facility: CLINIC | Age: 86
End: 2021-09-08
Payer: COMMERCIAL

## 2021-09-08 VITALS
WEIGHT: 103 LBS | HEART RATE: 76 BPM | BODY MASS INDEX: 22.22 KG/M2 | HEIGHT: 57 IN | TEMPERATURE: 98.2 F | DIASTOLIC BLOOD PRESSURE: 83 MMHG | SYSTOLIC BLOOD PRESSURE: 180 MMHG

## 2021-09-08 DIAGNOSIS — R10.30 LOWER ABDOMINAL PAIN: Primary | ICD-10-CM

## 2021-09-08 DIAGNOSIS — R89.4 ABNORMAL CELIAC ANTIBODY PANEL: ICD-10-CM

## 2021-09-08 DIAGNOSIS — K59.00 CONSTIPATION, UNSPECIFIED CONSTIPATION TYPE: ICD-10-CM

## 2021-09-08 PROCEDURE — 99214 OFFICE O/P EST MOD 30 MIN: CPT | Performed by: INTERNAL MEDICINE

## 2021-09-08 PROCEDURE — 1160F RVW MEDS BY RX/DR IN RCRD: CPT | Performed by: INTERNAL MEDICINE

## 2021-09-08 PROCEDURE — 1036F TOBACCO NON-USER: CPT | Performed by: INTERNAL MEDICINE

## 2021-09-08 NOTE — PATIENT INSTRUCTIONS
2-3 month follow up appointment in 11/12/2021 @ 11:30 am with Jovanna Jessica  Patient expressed understanding

## 2021-09-08 NOTE — PROGRESS NOTES
[facility] - Outpatient Follow-up Note  Victor M Conway 82 y o  female MRN: [id number]  Encounter: [id number]          ASSESSMENT AND PLAN:  45-year-old female with history of GERD, breast status post mastectomy who presents for evaluation  1  Lower abdominal pain  2  Constipation, unspecified constipation type  She was seen In 2019 for new onset of diarrhea and low abdominal discomfort had a CT scan showing acute proctocolitis  Her symptoms resolved with antibiotics  She reports undergoing a flexible sigmoid in outside institution which was normal     She re-presented to the emergency room 2 weeks ago with constipation not having a bowel movement for 7-8 days and lower abdominal discomfort  CT showed diverticulosis but was otherwise unremarkable  Her laboratory data was also within normal limits she was then started on MiraLax and Colace is now having a bowel movement 1 to 2 times a day  She has complete resolution of her lower obtain and prior symptoms  We discussed that her symptoms may been related to constipation given her unremarkable CT and labwork  I reviewed her CT scan she did have significant stool burden particularly in the left side in the rectum  Given that she has no red flag symptoms including weight loss, rectal bleeding and she has resolution of her symptoms with common management she does not require endoscopic evaluation at this time  I recommend that she add Metamucil daily to her regimen  If she has recurrent symptoms despite improvement of the constipation she may require flexible sigmoidoscopy or colonoscopy for workup  3  Abnormal celiac antibody panel    She was previously found to have a very mildly elevated tTG IgG on celiac panel in 2020  Her IgA and tTG IgA were negative  We discussed that  this unlikely represents celiac disease  I recommend that she can reincorporate gluten back into her diet   At her follow up visit we will resend the celiac panel and if still abnormal can discuss EGD At that time pending her symptoms  Follow-up in 2-3 months      ______________________________________________________________________    SUBJECTIVE:  51-year-old female with history of GERD, breast status post mastectomy who presents for evaluation  She was last seen in the GI office in 2019  At that time she has to the emergency room with abdominal pain and diarrhea  She underwent a CT scan showing acute proctocolitis  She was given a 7 day course of Cipro and discharge with improvement of her symptoms  Interval history:  She re-presented to the emergency room August 27th of this year with recurrent abdominal pain  She reports acute onset of bilateral lower quadrant discomfort which was nonradiating it lasted more than several minutes and was moderate to severe  She had no nausea or vomiting during that time  She has had significant constipation and no bowel movement up to 7-8 days prior to this time  She has struggled patient had not used anything as far as over-the-counter laxatives or stool softeners to aid in her symptoms  She denies melena or hematochezia  Her appetite is good her weight is stable  Obstructions series week prior had shown a nonobstructive gas pattern and CT scan in the emergency room showed no acute pathology, with diverticulosis no evidence of diverticulitis  She was started on MiraLax Colace daily and is now having bowel movements 1 to 2 times a day which are soft and easy to pass  She previously had straining in this has resolved as reports some smaller caliber stools ranging with formed stools  She continues to 9 mm head  Lipase and liver function tests were within normal limits other than a mildly elevated alkaline phosphatase at 124  She previously had a celiac serology and August 2020 showing elevated tTG IgG at 14 with remainder of celiac panel unremarkable      She reports having a flexible sigmoidoscopy 2 years ago which was normal   She reports her last full colonoscopy was 10 15 years ago and she has no prior history of polyps in the past     She reports a family history with colorectal cancer diagnosed in her 76s to [de-identified]  REVIEW OF SYSTEMS IS OTHERWISE NEGATIVE    Ten point review of systems is negative other than stated as per HPI    Historical Information   Past Medical History:   Diagnosis Date    Breast cancer ([facility] [address] ) 2007    Cellulitis of left upper arm     History of anxiety     History of appendicitis     History of gastroesophageal reflux (GERD)      Past Surgical History:   Procedure Laterality Date    APPENDECTOMY      Date Unknown    CATARACT EXTRACTION Bilateral     Left 2008 // Right 2008     SECTION      Date Unknown    HYSTERECTOMY      MASTECTOMY Right 2007    PARTIAL HYSTERECTOMY      fallopian tube removed, ? side    TUBAL LIGATION      Date Unknown     Social History   Social History     Substance and Sexual Activity   Alcohol Use No     Social History     Substance and Sexual Activity   Drug Use No     Social History     Tobacco Use   Smoking Status Never Smoker   Smokeless Tobacco Never Used     Family History   Problem Relation Age of Onset    Lung cancer Mother     Colon cancer Sister     Hypertension Family     No Known Problems Father     No Known Problems Daughter     No Known Problems Maternal Grandmother     No Known Problems Maternal Grandfather     No Known Problems Paternal Grandmother     No Known Problems Paternal Grandfather     No Known Problems Sister     Drug abuse Neg Hx        Meds/Allergies       Current Outpatient Medications:     bimatoprost (LUMIGAN) 0 01 % ophthalmic drops    dicyclomine (BENTYL) 20 mg tablet    Docusate Sodium (COLACE PO)    dorzolamide (TRUSOPT) 2 % ophthalmic solution    famotidine (PEPCID) 20 mg tablet    Probiotic Product (PROBIOTIC DAILY PO)    Allergies   Allergen Reactions    Medical Tape     Other Itching     Adhesive Tape           Objective     Blood pressure (!) 180/83, pulse 76, temperature 98 2 °F (36 8 °C), height 4' 9" (1 448 m), weight 46 7 kg (103 lb)  Body mass index is 22 29 kg/m²  PHYSICAL EXAM:      General Appearance:   Alert, cooperative, no distress   HEENT:   Normocephalic, atraumatic, anicteric  Neck:  Supple, symmetrical, trachea midline   Lungs:   Clear to auscultation bilaterally; no rales, rhonchi or wheezing; respirations unlabored    Heart[de-identified]   Regular rate and rhythm; no murmur, rub, or gallop  Abdomen:   Soft, mild lower abdominal tenderness to deep palpation without rebound or guarding, non-distended; normal bowel sounds; no masses, no organomegaly    Genitalia:   Deferred    Rectal:   Deferred    Extremities:  No cyanosis, clubbing or edema    Pulses:  2+ and symmetric    Skin:  No jaundice, rashes, or lesions    Lymph nodes:  No palpable cervical lymphadenopathy        Lab Results:   No visits with results within 1 Day(s) from this visit     Latest known visit with results is:   Admission on 08/27/2021, Discharged on 08/27/2021   Component Date Value    WBC 08/27/2021 5 47     RBC 08/27/2021 4 15     Hemoglobin 08/27/2021 12 6     Hematocrit 08/27/2021 39 2     MCV 08/27/2021 95     MCH 08/27/2021 30 4     MCHC 08/27/2021 32 1     RDW 08/27/2021 12 2     MPV 08/27/2021 10 0     Platelets 22/46/7261 272     nRBC 08/27/2021 0     Neutrophils Relative 08/27/2021 69     Immat GRANS % 08/27/2021 0     Lymphocytes Relative 08/27/2021 21     Monocytes Relative 08/27/2021 8     Eosinophils Relative 08/27/2021 1     Basophils Relative 08/27/2021 1     Neutrophils Absolute 08/27/2021 3 79     Immature Grans Absolute 08/27/2021 0 01     Lymphocytes Absolute 08/27/2021 1 15     Monocytes Absolute 08/27/2021 0 43     Eosinophils Absolute 08/27/2021 0 06     Basophils Absolute 08/27/2021 0 03     Sodium 08/27/2021 141     Potassium 08/27/2021 4 2     Chloride 08/27/2021 106     CO2 08/27/2021 25     ANION GAP 08/27/2021 10     BUN 08/27/2021 18     Creatinine 08/27/2021 1 09     Glucose 08/27/2021 101     Calcium 08/27/2021 9 2     AST 08/27/2021 15     ALT 08/27/2021 18     Alkaline Phosphatase 08/27/2021 130*    Total Protein 08/27/2021 7 6     Albumin 08/27/2021 3 9     Total Bilirubin 08/27/2021 0 38     eGFR 08/27/2021 47     Lipase 08/27/2021 133     LACTIC ACID 08/27/2021 1 1     Color, UA 08/27/2021 Light Yellow     Clarity, UA 08/27/2021 Clear     Specific Gravity, UA 08/27/2021 1 010     pH, UA 08/27/2021 5 0     Leukocytes, UA 08/27/2021 Negative     Nitrite, UA 08/27/2021 Negative     Protein, UA 08/27/2021 Negative     Glucose, UA 08/27/2021 Negative     Ketones, UA 08/27/2021 Negative     Urobilinogen, UA 08/27/2021 0 2     Bilirubin, UA 08/27/2021 Negative     Blood, UA 08/27/2021 Negative          Radiology Results:   XR abdomen obstruction series    Result Date: 8/19/2021  Narrative: OBSTRUCTION SERIES INDICATION:   R10 84: Generalized abdominal pain  COMPARISON:  None EXAM PERFORMED/VIEWS:  XR ABDOMEN OBSTRUCTION SERIES Images: 3 FINDINGS: Multiple surgical clips in the right axilla  Absence of the right breast shadow  There is a nonobstructive bowel gas pattern  No free air beneath the hemidiaphragms  Rounded stippled calcification in the right side of the pelvis consistent with a fibroid  No evidence for radiopaque renal or ureteral calculi  No acute osseous abnormalities  Examination of the chest reveals calcification of the aorta  No cardiomegaly is noted  The lungs are hyperinflated without evidence for consolidation or effusion  Impression: Nonobstructive bowel gas pattern  Prior right mastectomy and right axillary node dissection  Pulmonary hyperinflation consistent with emphysema  Calcified uterine fibroid   Workstation performed: [id number]     CT abdomen pelvis with contrast    Result Date: 8/27/2021  Narrative: CT ABDOMEN AND PELVIS WITH IV CONTRAST INDICATION:   LLQ abdominal pain, diverticulitis suspected LLQ tenderness  COMPARISON:  7/22/2019 TECHNIQUE:  CT examination of the abdomen and pelvis was performed  Axial, sagittal, and coronal 2D reformatted images were created from the source data and submitted for interpretation  Radiation dose length product (DLP) for this visit:  805 55 mGy-cm   This examination, like all CT scans performed in the [facility], was performed utilizing techniques to minimize radiation dose exposure, including the use of iterative  reconstruction and automated exposure control  IV Contrast:  100 mL of iohexol (OMNIPAQUE) Enteric Contrast:  Enteric contrast was not administered  FINDINGS: ABDOMEN LOWER CHEST:  Stable moderate hiatal hernia  No other clinically significant abnormality identified in the visualized lower chest  LIVER/BILIARY TREE:  One or more subcentimeter sharply circumscribed low-density hepatic lesion(s) are noted, too small to accurately characterize, but statistically most likely to represent subcentimeter hepatic cysts  No suspicious solid hepatic lesion is identified  Hepatic contours are normal   No biliary dilatation  GALLBLADDER:  No calcified gallstones  No pericholecystic inflammatory change  SPLEEN:  Unremarkable  PANCREAS:  Unremarkable  ADRENAL GLANDS:  Unremarkable  KIDNEYS/URETERS:  No hydronephrosis or urinary tract calculus  Exophytic simple cyst left midpole measuring 2 2 x 1 8 cm  One or more sharply circumscribed subcentimeter renal hypodensities are present, too small to accurately characterize, and statistically most likely benign findings  According to recent literature (Radiology 2019) no further workup of these findings is recommended  STOMACH AND BOWEL:  There is colonic diverticulosis without evidence of acute diverticulitis  APPENDIX:  No findings to suggest appendicitis  ABDOMINOPELVIC CAVITY:  No ascites  No pneumoperitoneum  No lymphadenopathy  VESSELS:  Atherosclerotic changes are present  No evidence of aneurysm  PELVIS REPRODUCTIVE ORGANS:  Unchanged densely calcified right adnexal nodule since 4/6/2009 measuring 2 4 x 2 4 cm, presumably a pedunculated fibroid  URINARY BLADDER:  Unremarkable  ABDOMINAL WALL/INGUINAL REGIONS:  Unremarkable  OSSEOUS STRUCTURES:  No acute fracture or destructive osseous lesion  Impression: No acute pathology  Colonic diverticulosis without evidence of acute diverticulitis   Workstation performed: [id number]

## 2021-11-02 ENCOUNTER — TELEPHONE (OUTPATIENT)
Dept: FAMILY MEDICINE CLINIC | Facility: CLINIC | Age: 86
End: 2021-11-02

## 2021-11-02 DIAGNOSIS — B34.9 VIRAL INFECTION, UNSPECIFIED: Primary | ICD-10-CM

## 2021-11-03 ENCOUNTER — APPOINTMENT (EMERGENCY)
Dept: CT IMAGING | Facility: HOSPITAL | Age: 86
End: 2021-11-03
Payer: COMMERCIAL

## 2021-11-03 ENCOUNTER — HOSPITAL ENCOUNTER (EMERGENCY)
Facility: HOSPITAL | Age: 86
Discharge: HOME | End: 2021-11-03
Attending: EMERGENCY MEDICINE
Payer: COMMERCIAL

## 2021-11-03 VITALS
WEIGHT: 96.12 LBS | BODY MASS INDEX: 20.8 KG/M2 | HEART RATE: 68 BPM | SYSTOLIC BLOOD PRESSURE: 167 MMHG | RESPIRATION RATE: 16 BRPM | DIASTOLIC BLOOD PRESSURE: 72 MMHG | TEMPERATURE: 97.9 F | OXYGEN SATURATION: 100 %

## 2021-11-03 DIAGNOSIS — S22.32XA LEFT RIB FRACTURE: Primary | ICD-10-CM

## 2021-11-03 LAB
ANION GAP SERPL CALCULATED.3IONS-SCNC: 8 MMOL/L (ref 4–13)
ANISOCYTOSIS BLD QL SMEAR: PRESENT
ATRIAL RATE: 71 BPM
BASOPHILS # BLD MANUAL: 0 THOUSAND/UL (ref 0–0.1)
BASOPHILS NFR MAR MANUAL: 0 % (ref 0–1)
BUN SERPL-MCNC: 23 MG/DL (ref 5–25)
CALCIUM SERPL-MCNC: 8.6 MG/DL (ref 8.3–10.1)
CHLORIDE SERPL-SCNC: 104 MMOL/L (ref 100–108)
CO2 SERPL-SCNC: 26 MMOL/L (ref 21–32)
CREAT SERPL-MCNC: 0.96 MG/DL (ref 0.6–1.3)
EOSINOPHIL # BLD MANUAL: 0 THOUSAND/UL (ref 0–0.4)
EOSINOPHIL NFR BLD MANUAL: 0 % (ref 0–6)
ERYTHROCYTE [DISTWIDTH] IN BLOOD BY AUTOMATED COUNT: 12.3 % (ref 11.6–15.1)
GFR SERPL CREATININE-BSD FRML MDRD: 54 ML/MIN/1.73SQ M
GLUCOSE SERPL-MCNC: 108 MG/DL (ref 65–140)
HCT VFR BLD AUTO: 34.6 % (ref 34.8–46.1)
HGB BLD-MCNC: 11 G/DL (ref 11.5–15.4)
LYMPHOCYTES # BLD AUTO: 1.14 THOUSAND/UL (ref 0.6–4.47)
LYMPHOCYTES # BLD AUTO: 20 % (ref 14–44)
MCH RBC QN AUTO: 30.1 PG (ref 26.8–34.3)
MCHC RBC AUTO-ENTMCNC: 31.8 G/DL (ref 31.4–37.4)
MCV RBC AUTO: 95 FL (ref 82–98)
MONOCYTES # BLD AUTO: 0.51 THOUSAND/UL (ref 0–1.22)
MONOCYTES NFR BLD: 9 % (ref 4–12)
NEUTROPHILS # BLD MANUAL: 3.98 THOUSAND/UL (ref 1.85–7.62)
NEUTS BAND NFR BLD MANUAL: 1 % (ref 0–8)
NEUTS SEG NFR BLD AUTO: 69 % (ref 43–75)
P AXIS: 72 DEGREES
PLATELET # BLD AUTO: 313 THOUSANDS/UL (ref 149–390)
PLATELET BLD QL SMEAR: ADEQUATE
PMV BLD AUTO: 9.9 FL (ref 8.9–12.7)
POTASSIUM SERPL-SCNC: 4 MMOL/L (ref 3.5–5.3)
PR INTERVAL: 138 MS
QRS AXIS: 79 DEGREES
QRSD INTERVAL: 82 MS
QT INTERVAL: 384 MS
QTC INTERVAL: 417 MS
RBC # BLD AUTO: 3.65 MILLION/UL (ref 3.81–5.12)
SODIUM SERPL-SCNC: 138 MMOL/L (ref 136–145)
T WAVE AXIS: 69 DEGREES
TROPONIN I SERPL-MCNC: <0.02 NG/ML
VARIANT LYMPHS # BLD AUTO: 1 %
VENTRICULAR RATE: 71 BPM
WBC # BLD AUTO: 5.69 THOUSAND/UL (ref 4.31–10.16)

## 2021-11-03 PROCEDURE — 36415 COLL VENOUS BLD VENIPUNCTURE: CPT | Performed by: EMERGENCY MEDICINE

## 2021-11-03 PROCEDURE — 99284 EMERGENCY DEPT VISIT MOD MDM: CPT

## 2021-11-03 PROCEDURE — 99285 EMERGENCY DEPT VISIT HI MDM: CPT | Performed by: EMERGENCY MEDICINE

## 2021-11-03 PROCEDURE — 74177 CT ABD & PELVIS W/CONTRAST: CPT

## 2021-11-03 PROCEDURE — 85027 COMPLETE CBC AUTOMATED: CPT | Performed by: EMERGENCY MEDICINE

## 2021-11-03 PROCEDURE — 93010 ELECTROCARDIOGRAM REPORT: CPT | Performed by: INTERNAL MEDICINE

## 2021-11-03 PROCEDURE — 70450 CT HEAD/BRAIN W/O DYE: CPT

## 2021-11-03 PROCEDURE — 85007 BL SMEAR W/DIFF WBC COUNT: CPT | Performed by: EMERGENCY MEDICINE

## 2021-11-03 PROCEDURE — 71260 CT THORAX DX C+: CPT

## 2021-11-03 PROCEDURE — 84484 ASSAY OF TROPONIN QUANT: CPT | Performed by: EMERGENCY MEDICINE

## 2021-11-03 PROCEDURE — 80048 BASIC METABOLIC PNL TOTAL CA: CPT | Performed by: EMERGENCY MEDICINE

## 2021-11-03 PROCEDURE — 93005 ELECTROCARDIOGRAM TRACING: CPT

## 2021-11-03 RX ORDER — LIDOCAINE 50 MG/G
1 PATCH TOPICAL ONCE
Status: DISCONTINUED | OUTPATIENT
Start: 2021-11-03 | End: 2021-11-03 | Stop reason: HOSPADM

## 2021-11-03 RX ADMIN — LIDOCAINE 5% 1 PATCH: 700 PATCH TOPICAL at 13:48

## 2021-11-03 RX ADMIN — SODIUM CHLORIDE 1000 ML: 0.9 INJECTION, SOLUTION INTRAVENOUS at 10:57

## 2021-11-03 RX ADMIN — IOHEXOL 100 ML: 350 INJECTION, SOLUTION INTRAVENOUS at 11:49

## 2021-11-09 ENCOUNTER — TELEPHONE (OUTPATIENT)
Dept: GASTROENTEROLOGY | Facility: CLINIC | Age: 86
End: 2021-11-09

## 2021-11-30 ENCOUNTER — DOCTOR'S OFFICE (OUTPATIENT)
Dept: URBAN - NONMETROPOLITAN AREA CLINIC 1 | Facility: CLINIC | Age: 86
Setting detail: OPHTHALMOLOGY
End: 2021-11-30
Payer: MEDICARE

## 2021-11-30 DIAGNOSIS — H40.1122: ICD-10-CM

## 2021-11-30 DIAGNOSIS — H40.1111: ICD-10-CM

## 2021-11-30 DIAGNOSIS — H04.123: ICD-10-CM

## 2021-11-30 DIAGNOSIS — Z96.1: ICD-10-CM

## 2021-11-30 PROCEDURE — 92133 CPTRZD OPH DX IMG PST SGM ON: CPT | Performed by: OPHTHALMOLOGY

## 2021-11-30 PROCEDURE — 99213 OFFICE O/P EST LOW 20 MIN: CPT | Performed by: OPHTHALMOLOGY

## 2021-11-30 ASSESSMENT — PACHYMETRY
OS_CT_CORRECTION: 0
OD_CT_CORRECTION: 0
OS_CT_UM: 541
OD_CT_UM: 540

## 2021-11-30 ASSESSMENT — REFRACTION_CURRENTRX
OD_SPHERE: -1.50
OS_CYLINDER: -1.75
OS_AXIS: 018
OD_AXIS: 181
OS_OVR_VA: 20/
OD_CYLINDER: 0.00
OS_SPHERE: -2.50
OD_OVR_VA: 20/

## 2021-11-30 ASSESSMENT — VISUAL ACUITY
OD_BCVA: 20/40+2
OS_BCVA: 20/40-2

## 2021-11-30 ASSESSMENT — CONFRONTATIONAL VISUAL FIELD TEST (CVF)
OS_FINDINGS: FULL
OD_FINDINGS: FULL

## 2021-11-30 ASSESSMENT — REFRACTION_AUTOREFRACTION
OD_AXIS: 115
OD_CYLINDER: -4.00
OS_CYLINDER: -4.25
OD_SPHERE: -0.25
OS_SPHERE: -1.00
OS_AXIS: 076

## 2021-11-30 ASSESSMENT — SPHEQUIV_DERIVED
OS_SPHEQUIV: -3.125
OD_SPHEQUIV: -2.25

## 2021-12-07 ENCOUNTER — AMBUL SURGICAL CARE (OUTPATIENT)
Dept: URBAN - NONMETROPOLITAN AREA SURGERY 1 | Facility: SURGERY | Age: 86
Setting detail: OPHTHALMOLOGY
End: 2021-12-07
Payer: MEDICARE

## 2021-12-07 DIAGNOSIS — H40.1111: ICD-10-CM

## 2021-12-07 PROCEDURE — G8918 PT W/O PREOP ORDER IV AB PRO: HCPCS | Performed by: OPHTHALMOLOGY

## 2021-12-07 PROCEDURE — G8907 PT DOC NO EVENTS ON DISCHARG: HCPCS | Performed by: OPHTHALMOLOGY

## 2021-12-07 PROCEDURE — 65855 TRABECULOPLASTY LASER SURG: CPT | Performed by: OPHTHALMOLOGY

## 2021-12-13 ENCOUNTER — DOCTOR'S OFFICE (OUTPATIENT)
Dept: URBAN - NONMETROPOLITAN AREA CLINIC 1 | Facility: CLINIC | Age: 86
Setting detail: OPHTHALMOLOGY
End: 2021-12-13
Payer: MEDICARE

## 2021-12-13 DIAGNOSIS — H40.1122: ICD-10-CM

## 2021-12-13 DIAGNOSIS — H40.1111: ICD-10-CM

## 2021-12-13 DIAGNOSIS — Z96.1: ICD-10-CM

## 2021-12-13 DIAGNOSIS — H04.123: ICD-10-CM

## 2021-12-13 PROCEDURE — 92083 EXTENDED VISUAL FIELD XM: CPT | Performed by: OPHTHALMOLOGY

## 2021-12-13 PROCEDURE — 99024 POSTOP FOLLOW-UP VISIT: CPT | Performed by: OPHTHALMOLOGY

## 2021-12-13 ASSESSMENT — PACHYMETRY
OS_CT_UM: 541
OS_CT_CORRECTION: 0
OD_CT_CORRECTION: 0
OD_CT_UM: 540

## 2021-12-13 ASSESSMENT — REFRACTION_CURRENTRX
OD_CYLINDER: 0.00
OD_AXIS: 181
OS_OVR_VA: 20/
OS_AXIS: 018
OS_SPHERE: -2.50
OS_CYLINDER: -1.75
OD_SPHERE: -1.50
OD_OVR_VA: 20/

## 2021-12-13 ASSESSMENT — REFRACTION_AUTOREFRACTION
OS_SPHERE: -1.00
OD_CYLINDER: -4.00
OD_SPHERE: -0.25
OS_CYLINDER: -4.25
OD_AXIS: 115
OS_AXIS: 076

## 2021-12-13 ASSESSMENT — TONOMETRY
OD_IOP_MMHG: 15
OS_IOP_MMHG: 16

## 2021-12-13 ASSESSMENT — VISUAL ACUITY
OS_BCVA: 20/30-2
OD_BCVA: 20/60+2

## 2021-12-13 ASSESSMENT — SPHEQUIV_DERIVED
OS_SPHEQUIV: -3.125
OD_SPHEQUIV: -2.25

## 2022-01-04 ENCOUNTER — AMBUL SURGICAL CARE (OUTPATIENT)
Dept: URBAN - NONMETROPOLITAN AREA SURGERY 1 | Facility: SURGERY | Age: 87
Setting detail: OPHTHALMOLOGY
End: 2022-01-04
Payer: MEDICARE

## 2022-01-04 DIAGNOSIS — H40.1122: ICD-10-CM

## 2022-01-04 PROCEDURE — G8907 PT DOC NO EVENTS ON DISCHARG: HCPCS | Performed by: OPHTHALMOLOGY

## 2022-01-04 PROCEDURE — 65855 TRABECULOPLASTY LASER SURG: CPT | Performed by: OPHTHALMOLOGY

## 2022-01-04 PROCEDURE — G8918 PT W/O PREOP ORDER IV AB PRO: HCPCS | Performed by: OPHTHALMOLOGY

## 2022-01-13 ENCOUNTER — HOSPITAL ENCOUNTER (OUTPATIENT)
Dept: NON INVASIVE DIAGNOSTICS | Facility: HOSPITAL | Age: 87
Discharge: HOME | End: 2022-01-13
Attending: FAMILY MEDICINE
Payer: COMMERCIAL

## 2022-01-13 VITALS
HEART RATE: 80 BPM | BODY MASS INDEX: 20.71 KG/M2 | DIASTOLIC BLOOD PRESSURE: 70 MMHG | HEIGHT: 57 IN | WEIGHT: 96 LBS | SYSTOLIC BLOOD PRESSURE: 160 MMHG

## 2022-01-13 DIAGNOSIS — R07.9 CHEST PAIN, UNSPECIFIED TYPE: ICD-10-CM

## 2022-01-13 PROCEDURE — 93880 EXTRACRANIAL BILAT STUDY: CPT | Performed by: SURGERY

## 2022-01-13 PROCEDURE — 93306 TTE W/DOPPLER COMPLETE: CPT | Performed by: INTERNAL MEDICINE

## 2022-01-13 PROCEDURE — 93880 EXTRACRANIAL BILAT STUDY: CPT

## 2022-01-13 PROCEDURE — 93306 TTE W/DOPPLER COMPLETE: CPT

## 2022-01-14 LAB
AORTIC ROOT: 2.6 CM
APICAL FOUR CHAMBER EJECTION FRACTION: 64 %
AV PEAK GRADIENT: 11 MMHG
AV REGURGITATION PRESSURE HALF TIME: 0.36 MS
E WAVE DECELERATION TIME: 173 MS
FRACTIONAL SHORTENING: 38 % (ref 28–44)
GLOBAL LONGITUIDAL STRAIN: 65 %
INTERVENTRICULAR SEPTUM IN DIASTOLE (PARASTERNAL SHORT AXIS VIEW): 0.8 CM
LEFT ATRIUM AREA SYSTOLE SINGLE PLANE A4C: 15 CM2
LEFT ATRIUM SIZE: 3.2 CM
LEFT INTERNAL DIMENSION IN SYSTOLE: 2.1 CM (ref 2.1–4)
LEFT VENTRICULAR INTERNAL DIMENSION IN DIASTOLE: 3.4 CM (ref 3.52–5.24)
LEFT VENTRICULAR POSTERIOR WALL IN END DIASTOLE: 0.8 CM
LEFT VENTRICULAR STROKE VOLUME: 33 ML
MV E'TISSUE VEL-SEP: 10 CM/S
MV PEAK A VEL: 1.17 M/S
MV PEAK E VEL: 106 CM/S
PA SYSTOLIC PRESSURE: 55 MMHG
RA PRESSURE ESTIMATED: 10 MMHG
RIGHT ATRIUM AREA SYSTOLE A4C: 11.6 CM2
RIGHT VENTRICLE ID DIMENSION: 1.7 CM
SL CV AV DECELERATION TIME RETROGRADE: 1227 MS
SL CV AV PEAK GRADIENT RETROGRADE: 96 MMHG
SL CV PED ECHO LEFT VENTRICLE DIASTOLIC VOLUME (MOD BIPLANE) 2D: 48 ML
SL CV PED ECHO LEFT VENTRICLE SYSTOLIC VOLUME (MOD BIPLANE) 2D: 15 ML
TRICUSPID VALVE PEAK REGURGITATION VELOCITY: 3.29 M/S
TV PEAK GRADIENT: 43 MMHG
Z-SCORE OF LEFT VENTRICULAR DIMENSION IN END SYSTOLE: -2.34

## 2022-02-08 ENCOUNTER — DOCTOR'S OFFICE (OUTPATIENT)
Dept: URBAN - NONMETROPOLITAN AREA CLINIC 1 | Facility: CLINIC | Age: 87
Setting detail: OPHTHALMOLOGY
End: 2022-02-08
Payer: MEDICARE

## 2022-02-08 ENCOUNTER — RX ONLY (RX ONLY)
Age: 87
End: 2022-02-08

## 2022-02-08 DIAGNOSIS — H40.1122: ICD-10-CM

## 2022-02-08 DIAGNOSIS — H04.123: ICD-10-CM

## 2022-02-08 DIAGNOSIS — Z96.1: ICD-10-CM

## 2022-02-08 DIAGNOSIS — H10.12: ICD-10-CM

## 2022-02-08 DIAGNOSIS — H40.1111: ICD-10-CM

## 2022-02-08 PROCEDURE — 68761 CLOSE TEAR DUCT OPENING: CPT | Performed by: OPHTHALMOLOGY

## 2022-02-08 PROCEDURE — 99214 OFFICE O/P EST MOD 30 MIN: CPT | Performed by: OPHTHALMOLOGY

## 2022-02-08 RX ORDER — OLOPATADINE HYDROCHLORIDE 1.11 MG/ML
SOLUTION/ DROPS OPHTHALMIC
Qty: 5 | Refills: 6 | Status: ACTIVE | OUTPATIENT

## 2022-02-08 RX ORDER — BETAXOLOL HYDROCHLORIDE 2.8 MG/ML
SUSPENSION/ DROPS OPHTHALMIC
Qty: 5 | Refills: 5 | Status: ACTIVE | OUTPATIENT

## 2022-02-08 ASSESSMENT — CONFRONTATIONAL VISUAL FIELD TEST (CVF)
OD_FINDINGS: FULL
OS_FINDINGS: FULL

## 2022-02-08 ASSESSMENT — PACHYMETRY
OS_CT_UM: 541
OD_CT_CORRECTION: 0
OD_CT_UM: 540
OS_CT_CORRECTION: 0

## 2022-02-08 ASSESSMENT — REFRACTION_AUTOREFRACTION
OS_AXIS: 78
OD_CYLINDER: -4.50
OS_CYLINDER: -3.00
OS_SPHERE: -1.50
OD_SPHERE: 0.00
OD_AXIS: 110

## 2022-02-08 ASSESSMENT — REFRACTION_CURRENTRX
OS_CYLINDER: -1.75
OD_CYLINDER: 0.00
OS_OVR_VA: 20/
OS_AXIS: 018
OD_AXIS: 181
OS_SPHERE: -2.50
OD_OVR_VA: 20/
OD_SPHERE: -1.50

## 2022-02-08 ASSESSMENT — VISUAL ACUITY
OS_BCVA: 20/40-2
OD_BCVA: 20/30-1

## 2022-02-08 ASSESSMENT — SPHEQUIV_DERIVED
OS_SPHEQUIV: -3
OD_SPHEQUIV: -2.25

## 2022-02-08 ASSESSMENT — LID EXAM ASSESSMENTS
OS_BLEPHARITIS: T
OD_BLEPHARITIS: T

## 2022-02-15 ENCOUNTER — DOCTOR'S OFFICE (OUTPATIENT)
Dept: URBAN - NONMETROPOLITAN AREA CLINIC 1 | Facility: CLINIC | Age: 87
Setting detail: OPHTHALMOLOGY
End: 2022-02-15
Payer: MEDICARE

## 2022-02-15 ENCOUNTER — RX ONLY (RX ONLY)
Age: 87
End: 2022-02-15

## 2022-02-15 DIAGNOSIS — H04.122: ICD-10-CM

## 2022-02-15 DIAGNOSIS — H04.121: ICD-10-CM

## 2022-02-15 PROCEDURE — 99024 POSTOP FOLLOW-UP VISIT: CPT | Performed by: OPHTHALMOLOGY

## 2022-02-15 RX ORDER — TIMOLOL MALEATE 5 MG/ML
SOLUTION OPHTHALMIC
Qty: 15 | Refills: 5 | Status: ACTIVE | OUTPATIENT

## 2022-02-15 ASSESSMENT — REFRACTION_AUTOREFRACTION
OD_AXIS: 110
OS_CYLINDER: -3.00
OD_SPHERE: 0.00
OS_SPHERE: -1.50
OS_AXIS: 78
OD_CYLINDER: -4.50

## 2022-02-15 ASSESSMENT — VISUAL ACUITY
OS_BCVA: 20/30-2
OD_BCVA: 20/30+2

## 2022-02-15 ASSESSMENT — TONOMETRY: OD_IOP_MMHG: 20

## 2022-02-15 ASSESSMENT — REFRACTION_CURRENTRX
OD_CYLINDER: 0.00
OS_CYLINDER: -1.75
OD_OVR_VA: 20/
OS_AXIS: 018
OD_AXIS: 181
OS_SPHERE: -2.50
OS_OVR_VA: 20/
OD_SPHERE: -1.50

## 2022-02-15 ASSESSMENT — CONFRONTATIONAL VISUAL FIELD TEST (CVF)
OS_FINDINGS: FULL
OD_FINDINGS: FULL

## 2022-02-15 ASSESSMENT — SPHEQUIV_DERIVED
OS_SPHEQUIV: -3
OD_SPHEQUIV: -2.25

## 2022-02-15 ASSESSMENT — LID EXAM ASSESSMENTS
OS_BLEPHARITIS: T
OD_BLEPHARITIS: T

## 2022-03-22 ENCOUNTER — RX ONLY (RX ONLY)
Age: 87
End: 2022-03-22

## 2022-03-22 ENCOUNTER — DOCTOR'S OFFICE (OUTPATIENT)
Dept: URBAN - NONMETROPOLITAN AREA CLINIC 1 | Facility: CLINIC | Age: 87
Setting detail: OPHTHALMOLOGY
End: 2022-03-22
Payer: MEDICARE

## 2022-03-22 DIAGNOSIS — H04.123: ICD-10-CM

## 2022-03-22 DIAGNOSIS — H40.1122: ICD-10-CM

## 2022-03-22 DIAGNOSIS — H40.1111: ICD-10-CM

## 2022-03-22 DIAGNOSIS — H10.12: ICD-10-CM

## 2022-03-22 PROBLEM — H35.373 ERM; BOTH EYES: Status: ACTIVE | Noted: 2021-04-27

## 2022-03-22 PROBLEM — H04.121 DRY EYE SYNDROME OF LACRIMAL GLAND; RIGHT EYE, LEFT EYE: Status: ACTIVE | Noted: 2021-07-12

## 2022-03-22 PROBLEM — H04.122 DRY EYE SYNDROME OF LACRIMAL GLAND; RIGHT EYE, LEFT EYE: Status: ACTIVE | Noted: 2021-07-12

## 2022-03-22 PROBLEM — Z96.1 PRESENCE OF INTRAOCULAR LENS , BOTH EYES: Status: ACTIVE | Noted: 2021-04-27

## 2022-03-22 PROCEDURE — 99214 OFFICE O/P EST MOD 30 MIN: CPT | Performed by: OPHTHALMOLOGY

## 2022-03-22 ASSESSMENT — PACHYMETRY
OS_CT_UM: 541
OS_CT_CORRECTION: 0
OD_CT_UM: 540
OD_CT_CORRECTION: 0

## 2022-03-22 ASSESSMENT — CONFRONTATIONAL VISUAL FIELD TEST (CVF)
OD_FINDINGS: FULL
OS_FINDINGS: FULL

## 2022-03-22 ASSESSMENT — REFRACTION_CURRENTRX
OS_SPHERE: -2.50
OD_CYLINDER: 0.00
OD_AXIS: 181
OS_CYLINDER: -1.75
OS_OVR_VA: 20/
OD_OVR_VA: 20/
OS_AXIS: 018
OD_SPHERE: -1.50

## 2022-03-22 ASSESSMENT — REFRACTION_AUTOREFRACTION
OD_CYLINDER: -4.25
OS_AXIS: 69
OD_AXIS: 101
OD_SPHERE: -0.25
OS_CYLINDER: -5.25
OS_SPHERE: 0.00

## 2022-03-22 ASSESSMENT — VISUAL ACUITY
OD_BCVA: 20/30-2
OS_BCVA: 20/30+1

## 2022-03-22 ASSESSMENT — SPHEQUIV_DERIVED
OD_SPHEQUIV: -2.375
OS_SPHEQUIV: -2.625

## 2022-03-22 ASSESSMENT — LID EXAM ASSESSMENTS
OS_BLEPHARITIS: T
OD_BLEPHARITIS: T

## 2022-07-13 ENCOUNTER — HOSPITAL ENCOUNTER (OUTPATIENT)
Dept: MAMMOGRAPHY | Facility: HOSPITAL | Age: 87
Discharge: HOME | End: 2022-07-13
Payer: COMMERCIAL

## 2022-07-13 VITALS — HEIGHT: 57 IN | WEIGHT: 96 LBS | BODY MASS INDEX: 20.71 KG/M2

## 2022-07-13 DIAGNOSIS — Z12.31 OTHER SCREENING MAMMOGRAM: ICD-10-CM

## 2022-07-13 PROCEDURE — 77063 BREAST TOMOSYNTHESIS BI: CPT

## 2022-07-13 PROCEDURE — 77067 SCR MAMMO BI INCL CAD: CPT

## 2023-03-02 ENCOUNTER — APPOINTMENT (OUTPATIENT)
Dept: LAB | Facility: HOSPITAL | Age: 88
End: 2023-03-02
Attending: FAMILY MEDICINE

## 2023-03-02 DIAGNOSIS — R03.0 ELEVATED BLOOD PRESSURE READING WITHOUT DIAGNOSIS OF HYPERTENSION: ICD-10-CM

## 2023-03-02 LAB
ALBUMIN SERPL BCP-MCNC: 4.4 G/DL (ref 3.5–5)
ALP SERPL-CCNC: 104 U/L (ref 34–104)
ALT SERPL W P-5'-P-CCNC: 15 U/L (ref 7–52)
ANION GAP SERPL CALCULATED.3IONS-SCNC: 12 MMOL/L (ref 4–13)
AST SERPL W P-5'-P-CCNC: 19 U/L (ref 13–39)
BILIRUB SERPL-MCNC: 0.46 MG/DL (ref 0.2–1)
BUN SERPL-MCNC: 32 MG/DL (ref 5–25)
CALCIUM SERPL-MCNC: 9.9 MG/DL (ref 8.4–10.2)
CHLORIDE SERPL-SCNC: 98 MMOL/L (ref 96–108)
CO2 SERPL-SCNC: 24 MMOL/L (ref 21–32)
CREAT SERPL-MCNC: 0.98 MG/DL (ref 0.6–1.3)
ERYTHROCYTE [DISTWIDTH] IN BLOOD BY AUTOMATED COUNT: 11.9 % (ref 11.6–15.1)
GFR SERPL CREATININE-BSD FRML MDRD: 52 ML/MIN/1.73SQ M
GLUCOSE P FAST SERPL-MCNC: 96 MG/DL (ref 65–99)
HCT VFR BLD AUTO: 40.9 % (ref 34.8–46.1)
HGB BLD-MCNC: 13.5 G/DL (ref 11.5–15.4)
MCH RBC QN AUTO: 31.8 PG (ref 26.8–34.3)
MCHC RBC AUTO-ENTMCNC: 33 G/DL (ref 31.4–37.4)
MCV RBC AUTO: 96 FL (ref 82–98)
PLATELET # BLD AUTO: 250 THOUSANDS/UL (ref 149–390)
PMV BLD AUTO: 9.8 FL (ref 8.9–12.7)
POTASSIUM SERPL-SCNC: 4.8 MMOL/L (ref 3.5–5.3)
PREALB SERPL-MCNC: 16.7 MG/DL (ref 18–40)
PROT SERPL-MCNC: 7.5 G/DL (ref 6.4–8.4)
RBC # BLD AUTO: 4.25 MILLION/UL (ref 3.81–5.12)
SODIUM SERPL-SCNC: 134 MMOL/L (ref 135–147)
WBC # BLD AUTO: 5.62 THOUSAND/UL (ref 4.31–10.16)

## 2023-03-08 ENCOUNTER — APPOINTMENT (EMERGENCY)
Dept: RADIOLOGY | Facility: HOSPITAL | Age: 88
End: 2023-03-08

## 2023-03-08 ENCOUNTER — HOSPITAL ENCOUNTER (EMERGENCY)
Facility: HOSPITAL | Age: 88
Discharge: STILL ADMITTED OR ADMINISTRATIVE | End: 2023-03-08
Attending: EMERGENCY MEDICINE

## 2023-03-08 ENCOUNTER — HOSPITAL ENCOUNTER (INPATIENT)
Facility: HOSPITAL | Age: 88
LOS: 6 days | Discharge: NURSING, REHAB OR LONG-TERM CARE | End: 2023-03-14
Attending: SURGERY | Admitting: SURGERY

## 2023-03-08 ENCOUNTER — APPOINTMENT (EMERGENCY)
Dept: CT IMAGING | Facility: HOSPITAL | Age: 88
End: 2023-03-08

## 2023-03-08 ENCOUNTER — APPOINTMENT (INPATIENT)
Dept: RADIOLOGY | Facility: HOSPITAL | Age: 88
End: 2023-03-08

## 2023-03-08 VITALS
RESPIRATION RATE: 16 BRPM | BODY MASS INDEX: 20.71 KG/M2 | SYSTOLIC BLOOD PRESSURE: 139 MMHG | TEMPERATURE: 99.9 F | HEART RATE: 76 BPM | DIASTOLIC BLOOD PRESSURE: 60 MMHG | OXYGEN SATURATION: 94 % | HEIGHT: 57 IN | WEIGHT: 96 LBS

## 2023-03-08 DIAGNOSIS — R74.8 ELEVATED CK: ICD-10-CM

## 2023-03-08 DIAGNOSIS — R93.0 ABNORMAL CT OF THE HEAD: ICD-10-CM

## 2023-03-08 DIAGNOSIS — G93.89 BRAIN MASS: ICD-10-CM

## 2023-03-08 DIAGNOSIS — I61.9 INTRAPARENCHYMAL HEMORRHAGE OF BRAIN (HCC): ICD-10-CM

## 2023-03-08 DIAGNOSIS — S42.309A HUMERUS FRACTURE: Primary | ICD-10-CM

## 2023-03-08 DIAGNOSIS — S42.212A CLOSED DISPLACED FRACTURE OF SURGICAL NECK OF LEFT HUMERUS, UNSPECIFIED FRACTURE MORPHOLOGY, INITIAL ENCOUNTER: Primary | ICD-10-CM

## 2023-03-08 PROBLEM — M84.422A PATHOLOGICAL FRACTURE, LEFT HUMERUS, INITIAL ENCOUNTER FOR FRACTURE: Status: ACTIVE | Noted: 2023-03-08

## 2023-03-08 LAB
ABO GROUP BLD: NORMAL
ALBUMIN SERPL BCP-MCNC: 4.1 G/DL (ref 3.5–5)
ALP SERPL-CCNC: 97 U/L (ref 34–104)
ALT SERPL W P-5'-P-CCNC: 16 U/L (ref 7–52)
ANION GAP SERPL CALCULATED.3IONS-SCNC: 6 MMOL/L (ref 4–13)
APTT PPP: 24 SECONDS (ref 23–37)
AST SERPL W P-5'-P-CCNC: 24 U/L (ref 13–39)
BASOPHILS # BLD AUTO: 0.01 THOUSANDS/ÂΜL (ref 0–0.1)
BASOPHILS NFR BLD AUTO: 0 % (ref 0–1)
BILIRUB SERPL-MCNC: 0.61 MG/DL (ref 0.2–1)
BLD GP AB SCN SERPL QL: NEGATIVE
BUN SERPL-MCNC: 29 MG/DL (ref 5–25)
CALCIUM SERPL-MCNC: 9.7 MG/DL (ref 8.4–10.2)
CARDIAC TROPONIN I PNL SERPL HS: 8 NG/L
CHLORIDE SERPL-SCNC: 98 MMOL/L (ref 96–108)
CK MB SERPL-MCNC: 12.8 NG/ML (ref 0.6–6.3)
CK MB SERPL-MCNC: 3.4 % (ref 0–2.5)
CK SERPL-CCNC: 373 U/L (ref 26–192)
CO2 SERPL-SCNC: 30 MMOL/L (ref 21–32)
CREAT SERPL-MCNC: 1.12 MG/DL (ref 0.6–1.3)
EOSINOPHIL # BLD AUTO: 0 THOUSAND/ÂΜL (ref 0–0.61)
EOSINOPHIL NFR BLD AUTO: 0 % (ref 0–6)
ERYTHROCYTE [DISTWIDTH] IN BLOOD BY AUTOMATED COUNT: 11.9 % (ref 11.6–15.1)
GFR SERPL CREATININE-BSD FRML MDRD: 44 ML/MIN/1.73SQ M
GLUCOSE SERPL-MCNC: 163 MG/DL (ref 65–140)
HCT VFR BLD AUTO: 35.4 % (ref 34.8–46.1)
HGB BLD-MCNC: 12.1 G/DL (ref 11.5–15.4)
IMM GRANULOCYTES # BLD AUTO: 0.03 THOUSAND/UL (ref 0–0.2)
IMM GRANULOCYTES NFR BLD AUTO: 0 % (ref 0–2)
INR PPP: 1.02 (ref 0.84–1.19)
LYMPHOCYTES # BLD AUTO: 0.91 THOUSANDS/ÂΜL (ref 0.6–4.47)
LYMPHOCYTES NFR BLD AUTO: 9 % (ref 14–44)
MCH RBC QN AUTO: 32 PG (ref 26.8–34.3)
MCHC RBC AUTO-ENTMCNC: 34.2 G/DL (ref 31.4–37.4)
MCV RBC AUTO: 94 FL (ref 82–98)
MONOCYTES # BLD AUTO: 1.15 THOUSAND/ÂΜL (ref 0.17–1.22)
MONOCYTES NFR BLD AUTO: 12 % (ref 4–12)
NEUTROPHILS # BLD AUTO: 7.56 THOUSANDS/ÂΜL (ref 1.85–7.62)
NEUTS SEG NFR BLD AUTO: 79 % (ref 43–75)
NRBC BLD AUTO-RTO: 0 /100 WBCS
PLATELET # BLD AUTO: 214 THOUSANDS/UL (ref 149–390)
PMV BLD AUTO: 9.8 FL (ref 8.9–12.7)
POTASSIUM SERPL-SCNC: 3.8 MMOL/L (ref 3.5–5.3)
PROT SERPL-MCNC: 6.8 G/DL (ref 6.4–8.4)
PROTHROMBIN TIME: 13.6 SECONDS (ref 11.6–14.5)
RBC # BLD AUTO: 3.78 MILLION/UL (ref 3.81–5.12)
RH BLD: POSITIVE
SODIUM SERPL-SCNC: 134 MMOL/L (ref 135–147)
SPECIMEN EXPIRATION DATE: NORMAL
WBC # BLD AUTO: 9.66 THOUSAND/UL (ref 4.31–10.16)

## 2023-03-08 RX ORDER — ONDANSETRON 2 MG/ML
4 INJECTION INTRAMUSCULAR; INTRAVENOUS EVERY 6 HOURS PRN
Status: DISCONTINUED | OUTPATIENT
Start: 2023-03-08 | End: 2023-03-14 | Stop reason: HOSPADM

## 2023-03-08 RX ORDER — DORZOLAMIDE HCL 20 MG/ML
1 SOLUTION/ DROPS OPHTHALMIC ONCE
Status: COMPLETED | OUTPATIENT
Start: 2023-03-08 | End: 2023-03-08

## 2023-03-08 RX ORDER — FAMOTIDINE 20 MG/1
20 TABLET, FILM COATED ORAL DAILY
Status: DISCONTINUED | OUTPATIENT
Start: 2023-03-09 | End: 2023-03-09

## 2023-03-08 RX ORDER — OXYCODONE HYDROCHLORIDE 5 MG/1
5 TABLET ORAL EVERY 4 HOURS PRN
Status: DISCONTINUED | OUTPATIENT
Start: 2023-03-08 | End: 2023-03-14 | Stop reason: HOSPADM

## 2023-03-08 RX ORDER — DICYCLOMINE HCL 20 MG
20 TABLET ORAL 2 TIMES DAILY
Status: DISCONTINUED | OUTPATIENT
Start: 2023-03-08 | End: 2023-03-09

## 2023-03-08 RX ORDER — ACETAMINOPHEN 325 MG/1
975 TABLET ORAL EVERY 8 HOURS SCHEDULED
Status: DISCONTINUED | OUTPATIENT
Start: 2023-03-08 | End: 2023-03-14 | Stop reason: HOSPADM

## 2023-03-08 RX ORDER — OXYCODONE HYDROCHLORIDE 5 MG/1
2.5 TABLET ORAL EVERY 4 HOURS PRN
Status: DISCONTINUED | OUTPATIENT
Start: 2023-03-08 | End: 2023-03-14 | Stop reason: HOSPADM

## 2023-03-08 RX ORDER — DOCUSATE SODIUM 100 MG/1
100 CAPSULE, LIQUID FILLED ORAL 2 TIMES DAILY
Status: DISCONTINUED | OUTPATIENT
Start: 2023-03-08 | End: 2023-03-14 | Stop reason: HOSPADM

## 2023-03-08 RX ADMIN — ACETAMINOPHEN 975 MG: 325 TABLET ORAL at 22:06

## 2023-03-08 RX ADMIN — DICYCLOMINE HYDROCHLORIDE 20 MG: 20 TABLET ORAL at 22:06

## 2023-03-08 RX ADMIN — GADOBUTROL 4 ML: 604.72 INJECTION INTRAVENOUS at 23:43

## 2023-03-08 RX ADMIN — BIMATOPROST 1 DROP: 0.1 SOLUTION/ DROPS OPHTHALMIC at 22:06

## 2023-03-08 RX ADMIN — DORZOLAMIDE HYDROCHLORIDE 1 DROP: 20 SOLUTION/ DROPS OPHTHALMIC at 22:06

## 2023-03-08 RX ADMIN — DOCUSATE SODIUM 100 MG: 100 CAPSULE, LIQUID FILLED ORAL at 18:35

## 2023-03-08 RX ADMIN — MORPHINE SULFATE 2 MG: 2 INJECTION, SOLUTION INTRAMUSCULAR; INTRAVENOUS at 14:49

## 2023-03-08 NOTE — EMTALA/ACUTE CARE TRANSFER
[facility] EMERGENCY DEPARTMENT  [address] [place] [id number]  Dept: [phone]      EMTALA TRANSFER CONSENT    NAME Otilia LOPEZ 1935                              MRN [id number]    I have been informed of my rights regarding examination, treatment, and transfer   by Dr Carlos Manuel Jaffe PA-C/ Piotr Blood,*    Benefits:  Trauma/neuro/ortho    Risks:  Transport Risks      Consent for Transfer:  I acknowledge that my medical condition has been evaluated and explained to me by the emergency department physician or other qualified medical person and/or my attending physician, who has recommended that I be transferred to the service of   Dr Liliam Richardson  at  [facility]  The above potential benefits of such transfer, the potential risks associated with such transfer, and the probable risks of not being transferred have been explained to me, and I fully understand them  The doctor has explained that, in my case, the benefits of transfer outweigh the risks  I agree to be transferred  I authorize the performance of emergency medical procedures and treatments upon me in both transit and upon arrival at the receiving facility  Additionally, I authorize the release of any and all medical records to the receiving facility and request they be transported with me, if possible  I understand that the safest mode of transportation during a medical emergency is an ambulance and that the Hospital advocates the use of this mode of transport  Risks of traveling to the receiving facility by car, including absence of medical control, life sustaining equipment, such as oxygen, and medical personnel has been explained to me and I fully understand them  (BIBIANA CORRECT BOX BELOW)  [  ]  I consent to the stated transfer and to be transported by ambulance/helicopter    [  ]  I consent to the stated transfer, but refuse transportation by ambulance and accept full responsibility for my transportation by car  I understand the risks of non-ambulance transfers and I exonerate the Hospital and its staff from any deterioration in my condition that results from this refusal     X___________________________________________    DATE  23  TIME________  Signature of patient or legally responsible individual signing on patient behalf           RELATIONSHIP TO PATIENT_________________________          Provider Certification    NAME Will Dee                                         1935                              MRN [id number]    A medical screening exam was performed on the above named patient  Based on the examination:    Condition Necessitating Transfer The primary encounter diagnosis was Humerus fracture  Diagnoses of Abnormal CT of the head and Elevated CK were also pertinent to this visit  Patient Condition:  Stable    Reason for Transfer:  Trauma/Neuro/Ortho    Transfer Requirements: Facility  [facility]   · Space available and qualified personnel available for treatment as acknowledged by  Radha Alvarado  · Agreed to accept transfer and to provide appropriate medical treatment as acknowledged by          · Appropriate medical records of the examination and treatment of the patient are provided at the time of transfer   [facility], [address] _______  · Transfer will be performed by qualified personnel from    and appropriate transfer equipment as required, including the use of necessary and appropriate life support measures      Provider Certification: I have examined the patient and explained the following risks and benefits of being transferred/refusing transfer to the patient/family:         Based on these reasonable risks and benefits to the patient and/or the unborn child(yani), and based upon the information available at the time of the patient’s examination, I certify that the medical benefits reasonably to be expected from the provision of appropriate medical treatments at another medical facility outweigh the increasing risks, if any, to the individual’s medical condition, and in the case of labor to the unborn child, from effecting the transfer      X____________________________________________ DATE 03/08/23        TIME_______      ORIGINAL - SEND TO MEDICAL RECORDS   COPY - SEND WITH PATIENT DURING TRANSFER

## 2023-03-08 NOTE — H&P
[address] [facility]  H&P- Otilia Casas 1935, 82 y o  female MRN: [id number]  Unit/Bed#: ED 07 Encounter: [id number]  Primary Care Provider: Adriana Rizvi DO   Date and time admitted to hospital: 3/8/2023  4:39 PM    Brain mass  Assessment & Plan  - CT head on 3/9 showed: 1 4 x 1 0 x 1 2 cm rounded focus of hyperdensity in the left anterior parietal lobe with extensive associated vasogenic edema worrisome for a hemorrhagic mass  MR brain without and with contrast is advised for further evaluation   - GCS 15  - Neurosurgery consulted recs appreciated  - HOT protocol   - Frequent neuro checks  - Hold AP/AC  - MRI ordered  - Stat CTH with GCS change >2    Pathological fracture, left humerus, initial encounter for fracture  Assessment & Plan  - Patient fell 3/7 onto left arm  - Xray showed acute proximal humerus fracture   - Patient currently in sling  - NV in tact  - Ortho consulted recs appreciated  - PT/OT when able       Trauma Alert: Other Transfer   Model of Arrival: Ambulance    Trauma Team: Attending Dr Jc Davila  Consultants:     Orthopedics: routine consult; Epic consult order placed; Neurosurgery: routine consult; Epic consult order placed; History of Present Illness     Chief Complaint: Humerus fracture & hemorrhagic brain mass  Mechanism:Fall     HPI:    Otilia Casas is a 82 y o  female who presents as a transfer from [place] for evaluation of hemorrhagic brain mass as well as humeral fracture  Patient reports taht she had a fall yesterday when she was walking in her driveway  Patient fell forward hitting her head and left arm  +LOC  Patient did not go to hospital at that time, returned to baseline  Earlier this morning patient was found on the ground of her bedroom  Patient was brought to the hospital for further evaluation   Patient found to have:     1 4 x 1 0 x 1 2 cm rounded focus of hyperdensity in the left anterior parietal lobe with extensive associated vasogenic edema worrisome for a hemorrhagic mass    As well as Acute left proximal humerus fracture  Transferred to [facility] for further evaluation  Review of Systems   Constitutional: Positive for activity change  Negative for diaphoresis  Eyes: Negative for photophobia  Respiratory: Negative for chest tightness and shortness of breath  Gastrointestinal: Negative for abdominal distention, constipation, diarrhea and vomiting  Genitourinary: Negative for difficulty urinating  Musculoskeletal: Negative for back pain and neck pain  Left arm pain   Skin: Negative for color change  Neurological: Negative for dizziness, tremors, seizures, weakness and headaches  Psychiatric/Behavioral: Negative for agitation and behavioral problems  All other systems reviewed and are negative  12-point, complete review of systems was reviewed and negative except as stated above  Historical Information     Past Medical History:   Diagnosis Date   • Breast cancer ([facility] [address] ) 2007   • Cellulitis of left upper arm    • History of anxiety    • History of appendicitis    • History of gastroesophageal reflux (GERD)      Past Surgical History:   Procedure Laterality Date   • APPENDECTOMY      Date Unknown   • CATARACT EXTRACTION Bilateral     Left 2008 // Right 2008   •  SECTION      Date Unknown   • HYSTERECTOMY     • MASTECTOMY Right 2007   • PARTIAL HYSTERECTOMY      fallopian tube removed, ? side   • TUBAL LIGATION      Date Unknown        Social History     Tobacco Use   • Smoking status: Never   • Smokeless tobacco: Never   Vaping Use   • Vaping Use: Never used   Substance Use Topics   • Alcohol use: No   • Drug use: No     There is no immunization history for the selected administration types on file for this patient  Last Tetanus:   Family History: Non-contributory    1   Before the illness or injury that brought you to the Emergency, did you need someone to help you on a regular basis? 1=Yes   2  Since the illness or injury that brought you to the Emergency, have you needed more help than usual to take care of yourself? 1=Yes   3  Have you been hospitalized for one or more nights during the past 6 months (excluding a stay in the Emergency Department)? 0=No   4  In general, do you see well? 0=Yes   5  In general, do you have serious problems with your memory? 1=Yes   6  Do you take more than three different medications everyday? 1=Yes   TOTAL   4     Did you order a geriatric consult if the score was 2 or greater?: yes     Meds/Allergies   all current active meds have been reviewed     Allergies   Allergen Reactions   • Medical Tape    • Other Itching     Adhesive Tape       Objective   Initial Vitals:   Temperature: 98 6 °F (37 °C) (03/08/23 1645)  Pulse: 70 (03/08/23 1645)  Respirations: 16 (03/08/23 1645)  Blood Pressure: 150/68 (03/08/23 1645)    Primary Survey:   Airway:        Status: patent;        Pre-hospital Interventions: none        Hospital Interventions: none  Breathing:        Pre-hospital Interventions: none       Effort: normal       Right breath sounds: normal       Left breath sounds: normal  Circulation:        Rhythm: regular       Rate: regular   Right Pulses Left Pulses    R radial: 2+  R femoral: 2+      R popliteal: 2+ L radial: 2+  L femoral: 2+      L popliteal: 2+   Disability:        GCS: Eye: 4; Verbal: 5 Motor: 6 Total: 15       Right Pupil: round;  reactive         Left Pupil:  round;  reactive      R Motor Strength L Motor Strength    R : 5/5  R dorsiflex: 5/5  R plantarflex: 5/5 L : 5/5  L dorsiflex: 5/5  L plantarflex: 5/5        Sensory:  No sensory deficit  Exposure:       Completed: Yes      Secondary Survey:  Physical Exam  Vitals reviewed  HENT:      Head: Normocephalic and atraumatic        Right Ear: External ear normal       Left Ear: External ear normal       Nose: Nose normal       Mouth/Throat:      Mouth: Mucous membranes are moist    Eyes:      Extraocular Movements: Extraocular movements intact  Pupils: Pupils are equal, round, and reactive to light  Cardiovascular:      Rate and Rhythm: Normal rate and regular rhythm  Pulses: Normal pulses  Heart sounds: Normal heart sounds  Pulmonary:      Breath sounds: Normal breath sounds  Abdominal:      General: Abdomen is flat  Musculoskeletal:      Right shoulder: Normal       Left shoulder: Decreased range of motion  Right upper arm: Normal       Left upper arm: No swelling or edema  Right elbow: Normal       Left elbow: No deformity  Normal range of motion  Right forearm: Normal       Left forearm: Normal       Right wrist: Normal       Left wrist: Normal       Right hand: Normal       Left hand: Normal       Cervical back: Normal range of motion  Comments: Sling in place on left arm   Skin:     General: Skin is warm  Capillary Refill: Capillary refill takes less than 2 seconds  Neurological:      General: No focal deficit present  Mental Status: She is alert and oriented to person, place, and time  Invasive Devices     None               Lab Results: Results: I have personally reviewed all pertinent laboratory/tests results    Imaging Results: I have personally reviewed pertinent reports  Chest Xray(s): N/A   FAST exam(s): N/A   CT Scan(s): N/A, positive for acute findings: see results   Additional Xray(s): positive for acute findings: See results      Other Studies:     Code Status: Level 3 - DNAR and DNI  Advance Directive and Living Will:      Power of :    POLST:    I have spent 35 minutes with Patient  today in which greater than 50% of this time was spent in counseling/coordination of care regarding Diagnostic results, Instructions for management, Impressions, Reviewing / ordering tests, medicine, procedures  , Obtaining or reviewing history   and Communicating with other healthcare professionals

## 2023-03-08 NOTE — ED PROVIDER NOTES
Emergency Department Trauma Note  Conchita Aldana 82 y o  female MRN: [id number]  Unit/Bed#: RM10/RM10 Encounter: [id number]      Trauma Alert: Trauma Acuity: Trauma Evaluation  Model of Arrival:   via    Trauma Team: Current Providers  Attending Provider: Amrita Pink DO  Unit Clerk: Ade Brewster  ED Technician: Jean-Claude Bagley  Registered Nurse: Liliam Shaver RN  Physician Assistant: Zahraa Delgado PA-C  Consultants:     None      History of Present Illness     Chief Complaint:   Chief Complaint   Patient presents with   • Arm Injury - Major     Patient fell yesterday in her driveway  Denies hitting head, loc, or use of thinners  Pain, bruising, and skin tear present  Denies taking anything for pain and would not like anything     HPI:  Conchita Aldana is a 82 y o  female who presents with fall x2, trauma, syncope  Mechanism:Details of Incident: patient fell in her driveway yesterday  bruising and limited ROM present  Injury Date: 03/07/23   Injury Occurence Location - [address] [place] [address] [facility]    Patient presents to the emergency department today via emergency medical services according to the patient's daughter when I arrived at bedside x-ray was just coming out of the room to complete left-sided humerus x-ray  Daughter was at bedside, daughter provided the history and states her mother lives with her, yesterday afternoon at approximately 1640 hrs  they were ambulating in the driveway patient sustained a fall where she fell forward on her head and left arm  Daughter states it took her about 1/2-hour to get her off the ground and into the car  Daughter states that there was a history of loss of consciousness after the initial fall  Daughter states she took her to her primary care provider however they said stated they could not see her today and again this is according to the daughter subjective history  They subsequently went home  Patient was ambulatory at that point      This morning patient was found lying on the floor of her bedroom, she evidently sustained another fall where she passed out according to her own personal history  She was brought here via EMS  EMS was already gone by the time I evaluated the patient therefore did not talk to them  Patient has complaints of left upper arm pain primarily however she did lay on the ground overnight with 2 syncopal episodes since the fall  She also has bruising of the right knee  I did make her a trauma evaluation at the time of my physical examination  Patient does not have great recollection of the event  Review of Systems   Constitutional: Negative for chills and fever  HENT: Negative for ear pain and sore throat  Eyes: Negative for pain and visual disturbance  Respiratory: Negative for cough and shortness of breath  Cardiovascular: Negative for chest pain and palpitations  Gastrointestinal: Negative for abdominal pain and vomiting  Genitourinary: Negative for dysuria and hematuria  Musculoskeletal: Negative for arthralgias and back pain  Left upper arm pain bilateral knee pain   Skin: Positive for wound  Negative for color change and rash  Contusion left upper arm, skin tear left elbow   Neurological: Negative for seizures and syncope  All other systems reviewed and are negative  Historical Information     Immunizations: There is no immunization history for the selected administration types on file for this patient      Past Medical History:   Diagnosis Date   • Breast cancer ([facility] [address] ) 07/19/2007   • Cellulitis of left upper arm    • History of anxiety    • History of appendicitis    • History of gastroesophageal reflux (GERD)        Family History   Problem Relation Age of Onset   • Lung cancer Mother    • Colon cancer Sister    • Hypertension Family    • No Known Problems Father    • No Known Problems Daughter    • No Known Problems Maternal Grandmother    • No Known Problems Maternal Grandfather    • No Known Problems Paternal Grandmother    • No Known Problems Paternal Grandfather    • No Known Problems Sister    • Drug abuse Neg Hx      Past Surgical History:   Procedure Laterality Date   • APPENDECTOMY      Date Unknown   • CATARACT EXTRACTION Bilateral     Left 2008 // Right 2008   •  SECTION      Date Unknown   • HYSTERECTOMY     • MASTECTOMY Right 2007   • PARTIAL HYSTERECTOMY      fallopian tube removed, ? side   • TUBAL LIGATION      Date Unknown     Social History     Tobacco Use   • Smoking status: Never   • Smokeless tobacco: Never   Vaping Use   • Vaping Use: Never used   Substance Use Topics   • Alcohol use: No   • Drug use: No     E-Cigarette/Vaping   • E-Cigarette Use Never User      E-Cigarette/Vaping Substances   • Nicotine No    • THC No    • CBD No    • Flavoring No    • Other No    • Unknown No        Family History: non-contributory    Meds/Allergies   Prior to Admission Medications   Prescriptions Last Dose Informant Patient Reported? Taking?    Docusate Sodium (COLACE PO)   Yes No   Sig: Take by mouth   Probiotic Product (PROBIOTIC DAILY PO)   Yes No   Sig: Take by mouth   bimatoprost (LUMIGAN) 0 01 % ophthalmic drops   Yes No   Sig: Administer 1 drop to both eyes daily at bedtime   dicyclomine (BENTYL) 20 mg tablet   No No   Sig: Take 1 tablet (20 mg total) by mouth 2 (two) times a day   dorzolamide (TRUSOPT) 2 % ophthalmic solution   Yes No   Sig: Administer 1 drop to both eyes    famotidine (PEPCID) 20 mg tablet   No No   Sig: Take 1 tablet (20 mg total) by mouth daily      Facility-Administered Medications: None       Allergies   Allergen Reactions   • Medical Tape    • Other Itching     Adhesive Tape       PHYSICAL EXAM    PE limited by: none    Objective   Vitals:   First set: Temperature: 99 9 °F (37 7 °C) (23 1154)  Pulse: 79 (23 1154)  Respirations: 18 (23 1154)  Blood Pressure: (!) 198/79 (23 1154)  SpO2: 95 % (23 1154)    Primary Survey:   (A) Airway: patent/clear  (B) Breathing: CTA b/l  (C) Circulation: Pulses:   radial  4/4  (D) Disabliity:  GCS Total:  15  (E) Expose:  Completed    Secondary Survey: (Click on Physical Exam tab above)  Physical Exam  Vitals and nursing note reviewed  Constitutional:       General: She is not in acute distress  Appearance: Normal appearance  She is well-developed  She is not ill-appearing, toxic-appearing or diaphoretic  HENT:      Head: Normocephalic and atraumatic  Right Ear: Tympanic membrane, ear canal and external ear normal  There is no impacted cerumen  Left Ear: Tympanic membrane, ear canal and external ear normal  There is no impacted cerumen  Ears:      Comments: Negative hemotympanum bilaterally  No blood in the ear canals  Nose: Nose normal  No congestion or rhinorrhea  Comments: Negative septal hematoma     Mouth/Throat:      Mouth: Mucous membranes are moist       Pharynx: Oropharynx is clear  No oropharyngeal exudate or posterior oropharyngeal erythema  Comments: Negative intraoral trauma  Eyes:      General: No scleral icterus  Right eye: No discharge  Left eye: No discharge  Extraocular Movements: Extraocular movements intact  Conjunctiva/sclera: Conjunctivae normal       Pupils: Pupils are equal, round, and reactive to light  Neck:      Vascular: No carotid bruit  Cardiovascular:      Rate and Rhythm: Normal rate and regular rhythm  Heart sounds: No murmur heard  Pulmonary:      Effort: Pulmonary effort is normal  No respiratory distress  Breath sounds: Normal breath sounds  No stridor  No wheezing, rhonchi or rales  Chest:      Chest wall: No tenderness  Abdominal:      General: There is no distension  Palpations: Abdomen is soft  Tenderness: There is no abdominal tenderness  There is no guarding or rebound     Musculoskeletal:         General: No swelling, tenderness, deformity or signs of injury  Normal range of motion  Cervical back: Neck supple  No rigidity or tenderness  Right lower leg: No edema  Left lower leg: No edema  Comments: No central spinal tenderness crepitus step-offs contusion abrasion or laceration noted  Patient with tenderness of the left shoulder left humerus region  She also has tenderness of the bilateral knee region  Lymphadenopathy:      Cervical: No cervical adenopathy  Skin:     General: Skin is warm and dry  Capillary Refill: Capillary refill takes less than 2 seconds  Comments: Patient has open wound just distal to the left elbow posteriorly consistent with a 24-hour wound, no active bleeding  It measures approximately 1-1/2 cm wide by about 5 cm long  She has extensive contusion of the left biceps region  Neurological:      General: No focal deficit present  Mental Status: She is alert and oriented to person, place, and time  Mental status is at baseline  Psychiatric:         Mood and Affect: Mood normal          Cervical spine cleared by clinical criteria? No (imaging required)    Patient's cervical spine was personally evaluated  There is no evidence of contusion, abrasion, laceration or swelling  No cervical spine tenderness  Patient has full range of motion both actively and passively against resistance without pain elicited  Cervical spine is considered clear    Invasive Devices     None                 Lab Results:   Results Reviewed     Procedure Component Value Units Date/Time    CKMB [[id number]]  (Abnormal) Collected: 03/08/23 1306    Lab Status: Final result Specimen: Blood from Arm, Right Updated: 03/08/23 1412     CK-MB Index 3 4 %      CK-MB 12 8 ng/mL     HS Troponin 0hr (reflex protocol) [[id number]]  (Normal) Collected: 03/08/23 1306    Lab Status: Final result Specimen: Blood from Arm, Right Updated: 03/08/23 1332     hs TnI 0hr 8 ng/L     HS Troponin I 2hr [[id number]]     Lab Status: No result Specimen: Blood     Comprehensive metabolic panel [[id number]]  (Abnormal) Collected: 03/08/23 1306    Lab Status: Final result Specimen: Blood from Arm, Right Updated: 03/08/23 1326     Sodium 134 mmol/L      Potassium 3 8 mmol/L      Chloride 98 mmol/L      CO2 30 mmol/L      ANION GAP 6 mmol/L      BUN 29 mg/dL      Creatinine 1 12 mg/dL      Glucose 163 mg/dL      Calcium 9 7 mg/dL      AST 24 U/L      ALT 16 U/L      Alkaline Phosphatase 97 U/L      Total Protein 6 8 g/dL      Albumin 4 1 g/dL      Total Bilirubin 0 61 mg/dL      eGFR 44 ml/min/1 73sq m     Narrative:      [place] guidelines for Chronic Kidney Disease (CKD):   •  Stage 1 with normal or high GFR (GFR > 90 mL/min/1 73 square meters)  •  Stage 2 Mild CKD (GFR = 60-89 mL/min/1 73 square meters)  •  Stage 3A Moderate CKD (GFR = 45-59 mL/min/1 73 square meters)  •  Stage 3B Moderate CKD (GFR = 30-44 mL/min/1 73 square meters)  •  Stage 4 Severe CKD (GFR = 15-29 mL/min/1 73 square meters)  •  Stage 5 End Stage CKD (GFR <15 mL/min/1 73 square meters)  Note: GFR calculation is accurate only with a steady state creatinine    CK Total with Reflex CKMB [[id number]]  (Abnormal) Collected: 03/08/23 1306    Lab Status: Final result Specimen: Blood from Arm, Right Updated: 03/08/23 1326     Total  U/L     Protime-INR [[id number]]  (Normal) Collected: 03/08/23 1306    Lab Status: Final result Specimen: Blood from Arm, Right Updated: 03/08/23 1320     Protime 13 6 seconds      INR 1 02    APTT [[id number]]  (Normal) Collected: 03/08/23 1306    Lab Status: Final result Specimen: Blood from Arm, Right Updated: 03/08/23 1320     PTT 24 seconds     CBC and differential [[id number]]  (Abnormal) Collected: 03/08/23 1306    Lab Status: Final result Specimen: Blood from Arm, Right Updated: 03/08/23 1311     WBC 9 66 Thousand/uL      RBC 3 78 Million/uL      Hemoglobin 12 1 g/dL      Hematocrit 35 4 %      MCV 94 fL      MCH 32 0 pg      MCHC 34 2 g/dL      RDW 11 9 %      MPV 9 8 fL      Platelets 687 Thousands/uL      nRBC 0 /100 WBCs      Neutrophils Relative 79 %      Immat GRANS % 0 %      Lymphocytes Relative 9 %      Monocytes Relative 12 %      Eosinophils Relative 0 %      Basophils Relative 0 %      Neutrophils Absolute 7 56 Thousands/µL      Immature Grans Absolute 0 03 Thousand/uL      Lymphocytes Absolute 0 91 Thousands/µL      Monocytes Absolute 1 15 Thousand/µL      Eosinophils Absolute 0 00 Thousand/µL      Basophils Absolute 0 01 Thousands/µL                  Imaging Studies:   Direct to CT: No  CT spine cervical without contrast   Final Result by Thi Cantrell MD (03/08 9910)      No acute cervical spine fracture or traumatic malalignment  Workstation performed: [id number]         CT head without contrast   Final Result by Thi Cantrell MD (03/08 4616)      1 4 x 1 0 x 1 2 cm rounded focus of hyperdensity in the left anterior parietal lobe with extensive associated vasogenic edema worrisome for a hemorrhagic mass  MR brain without and with contrast is advised for further evaluation        I personally discussed this study with Aleksandra Cornelius on 3/8/2023 at 2:03 PM                            Workstation performed: [id number]         XR humerus LEFT    (Results Pending)   XR chest 1 view portable    (Results Pending)   XR knee 4+ vw right injury    (Results Pending)   XR knee 4+ vw left injury    (Results Pending)   XR elbow 2 vw left    (Results Pending)         Procedures  ECG 12 Lead Documentation Only    Date/Time: 3/8/2023 1:08 PM  Performed by: Nasrin Lr PA-C  Authorized by: Nasrin Lr PA-C     Indications / Diagnosis:  Syncope  ECG reviewed by me, the ED Provider: yes    Patient location:  ED  Previous ECG:     Comparison to cardiac monitor: Yes    Interpretation:     Interpretation: normal    Rate:     ECG rate:  72    ECG rate assessment: normal    Rhythm:     Rhythm: sinus rhythm    Ectopy: Ectopy: none    QRS:     QRS axis:  Normal    QRS intervals:  Normal  Conduction:     Conduction: normal    ST segments:     ST segments:  Normal  T waves:     T waves: normal               ED Course  ED Course as of 03/08/23 1451   Wed Mar 08, 2023   1319 Wbc hgb platelet normal, reviewed   1353 Total CK(!): 373  Mildly elevated ck   1353 IMPRESSION:     No acute cervical spine fracture or traumatic malalignment               Medical Decision Making  82-year-old female presents via EMS for evaluation of fall  Initially was triaged as an arm injury however upon my evaluation trauma evaluation was called secondary to fall x2 with LOC x2 with contusion left upper arm  She will receive CT scans of the head cervical spine x-rays of the chest arm as well as bilateral knees secondary to tenderness on examination  We will rule out intracranial hemorrhage as well as acute osseous abnormality or metabolic causes  I did independently interpret all x-rays, there is a proximal fracture of the humerus  There is no elbow fracture I cannot find evidence of fractures of the knee bilaterally  I discussed the case directly with on-call trauma Dr  Gil Strickland patient to be transferred to [facility] under the trauma service  Family aware  Amount and/or Complexity of Data Reviewed  Labs: ordered  Decision-making details documented in ED Course  Radiology: ordered  Risk  Prescription drug management                    Disposition  [facility] Transfer: No  Final diagnoses:   Humerus fracture   Abnormal CT of the head - Hemorrhagic mass   Elevated CK     Time reflects when diagnosis was documented in both MDM as applicable and the Disposition within this note     Time User Action Codes Description Comment    3/8/2023  2:16 PM Galo Alejandre Add [Z29 387N] Humerus fracture     3/8/2023  2:33 PM Hi Lute D Add [R93 0] Abnormal CT of the head     3/8/2023  2:33 PM Galo Alejandre Add [R74 8] Elevated CK 3/8/2023  2:35 PM Hesham RODNEY Modify [R93 0] Abnormal CT of the head Hemorrhagic mass      ED Disposition     ED Disposition   Transfer to Another Facility-In Network    Condition   --    Date/Time   Wed Mar 8, 2023  2:16 PM    Comment   Levy Roles should be transferred out to [facility] Trauma  Follow-up Information    None       Patient's Medications   Discharge Prescriptions    No medications on file     No discharge procedures on file      PDMP Review       Value Time User    PDMP Reviewed  Yes 2/26/2021  3:35 PM Jaison Moore DO          ED Provider  Electronically Signed by         Natalia Kimbrough PA-C  03/08/23 7508

## 2023-03-08 NOTE — CONSULTS
Orthopedics   Geronimo Bowman 82 y o  female MRN: [id number]  Unit/Bed#: ED 7      Chief Complaint:   left arm and shoulder pain    HPI:   82 y o  right hand dominant female status post syncopal fall x2 complaining of left shoulder pain  Initially at 1600 hrs yesterday, patient fell while walking in daughter's driveway, per daughter striking her head and L shoulder with a brief period of LOC  Daughter reports having taken 30 minutes to get patient off the ground and into the car, where she then presented to her PCP who could not see her  This AM, patient was found unconscious on bedroom floor with unknown amount of downtime  + Headstrike, + LOC, Not taking Blood thinners  Pain is shrap in character, Located L shoulder, acute in onset, constant in duration, severe in intensity  Exacerbating factors motion, remitting factors rest  Nonradiating, no numbness, no tingling, skin abrasion noted to L shoulder  No other complaints at this time  PMH significant for breast cancer, anxiety  Occupation retired  Lives at daughter's home      Review Of Systems:   · Skin: See HPI  · Neuro: See HPI  · Musculoskeletal: See HPI  · 14 point review of systems negative except as stated above     Past Medical History:   Past Medical History:   Diagnosis Date   • Breast cancer ([facility] [address] ) 2007   • Cellulitis of left upper arm    • History of anxiety    • History of appendicitis    • History of gastroesophageal reflux (GERD)        Past Surgical History:   Past Surgical History:   Procedure Laterality Date   • APPENDECTOMY      Date Unknown   • CATARACT EXTRACTION Bilateral     Left 2008 // Right 2008   •  SECTION      Date Unknown   • HYSTERECTOMY     • MASTECTOMY Right 2007   • PARTIAL HYSTERECTOMY      fallopian tube removed, ? side   • TUBAL LIGATION      Date Unknown       Family History:  Family history reviewed and non-contributory  Family History   Problem Relation Age of Onset   • Lung cancer Mother    • Colon cancer Sister    • Hypertension Family    • No Known Problems Father    • No Known Problems Daughter    • No Known Problems Maternal Grandmother    • No Known Problems Maternal Grandfather    • No Known Problems Paternal Grandmother    • No Known Problems Paternal Grandfather    • No Known Problems Sister    • Drug abuse Neg Hx        Social History:  Social History     Socioeconomic History   • Marital status:      Spouse name: None   • Number of children: None   • Years of education: None   • Highest education level: None   Occupational History   • None   Tobacco Use   • Smoking status: Never   • Smokeless tobacco: Never   Vaping Use   • Vaping Use: Never used   Substance and Sexual Activity   • Alcohol use: No   • Drug use: No   • Sexual activity: None   Other Topics Concern   • None   Social History Narrative    Caffeine Use    Dental Care, Regularly    Good Dental Hygiene    Uses Safety Equipment: Seatbelts         Social Determinants of Health     Financial Resource Strain: Not on file   Food Insecurity: Not on file   Transportation Needs: Not on file   Physical Activity: Not on file   Stress: Not on file   Social Connections: Not on file   Intimate Partner Violence: Not on file   Housing Stability: Not on file       Allergies:    Allergies   Allergen Reactions   • Medical Tape    • Other Itching     Adhesive Tape           Labs:  0   Lab Value Date/Time    HCT 35 4 03/08/2023 1306    HCT 40 9 03/02/2023 1541    HCT 34 6 (L) 11/03/2021 1053    HCT 39 9 05/20/2015 0826    HCT 40 5 06/02/2014 0846    HGB 12 1 03/08/2023 1306    HGB 13 5 03/02/2023 1541    HGB 11 0 (L) 11/03/2021 1053    HGB 13 4 05/20/2015 0826    HGB 13 2 06/02/2014 0846    INR 1 02 03/08/2023 1306    WBC 9 66 03/08/2023 1306    WBC 5 62 03/02/2023 1541    WBC 5 69 11/03/2021 1053    WBC 5 68 05/20/2015 0826    WBC 6 10 06/02/2014 0846       Meds:    Current Facility-Administered Medications:   •  acetaminophen (TYLENOL) tablet 975 mg, 975 mg, Oral, Q8H [address], Fernando Held Palladino, DO  •  docusate sodium (COLACE) capsule 100 mg, 100 mg, Oral, BID, Fernando Held Palladino, DO  •  naloxone (NARCAN) 0 04 mg/mL syringe 0 04 mg, 0 04 mg, Intravenous, Q1MIN PRN, Fernando Held Palladino, DO  •  ondansetron (ZOFRAN) injection 4 mg, 4 mg, Intravenous, Q6H PRN, Fernando Held Palladino, DO  •  oxyCODONE (ROXICODONE) IR tablet 2 5 mg, 2 5 mg, Oral, Q4H PRN, Fernando Held Palladino, DO  •  oxyCODONE (ROXICODONE) IR tablet 5 mg, 5 mg, Oral, Q4H PRN, Conchita Fix, DO    Current Outpatient Medications:   •  bimatoprost (LUMIGAN) 0 01 % ophthalmic drops, Administer 1 drop to both eyes daily at bedtime, Disp: , Rfl:   •  dicyclomine (BENTYL) 20 mg tablet, Take 1 tablet (20 mg total) by mouth 2 (two) times a day, Disp: 20 tablet, Rfl: 0  •  Docusate Sodium (COLACE PO), Take by mouth, Disp: , Rfl:   •  dorzolamide (TRUSOPT) 2 % ophthalmic solution, Administer 1 drop to both eyes , Disp: , Rfl:   •  famotidine (PEPCID) 20 mg tablet, Take 1 tablet (20 mg total) by mouth daily, Disp: 30 tablet, Rfl: 0  •  Probiotic Product (PROBIOTIC DAILY PO), Take by mouth, Disp: , Rfl:     Blood Culture:   No results found for: BLOODCX    Wound Culture:   No results found for: WOUNDCULT    Ins and Outs:  No intake/output data recorded            Physical Exam:   /76   Pulse 72   Temp 98 6 °F (37 °C) (Oral)   Resp 18   Wt 43 5 kg (96 lb)   SpO2 99%   BMI 20 77 kg/m²   Gen: No acute distress, resting comfortably in bed  HEENT: Eyes clear, moist mucus membranes, hearing intact  Respiratory: No audible wheezing or stridor  Cardiovascular: Well Perfused peripherally, 2+ distal pulse  Abdomen: nondistended, no peritoneal signs  Musculoskeletal: left upper extremity  · Skin intact with the exception of a skin tear on L shoulder that does not probe to bone, ecchymosis and swelling noted over the shoulder  · Tender to palpation over shoulder and upper arm  · Sensation intact to radial, ulna, median, musculocutaneous, and axillary nerve distributions  · Motor intact to  radial, ulnar, median, musculocutaneous, and axillary nerve distributions  · Palpable radial and ulnar pulse, symmetric b/l  · Musculature is soft and compressible, no pain with passive stretch    Radiology:   I personally reviewed the films  X-rays AP/Lateral, scapular Y, and axillary views of left humerus shows two part proximal humerus fracture    Assessment:  82 y o  female S/P syncopal fall x 2 with left proximal humerus fracture    Plan:   · Sling placed  · Analgesics for pain  · NonOp management of left proximal humeral fracture  · NWB LUE  · Body mass index is 20 77 kg/m²    · Dispo: Ortho will follow    Judith Baez MD

## 2023-03-08 NOTE — ASSESSMENT & PLAN NOTE
- CT head on 3/9 showed: 1 4 x 1 0 x 1 2 cm rounded focus of hyperdensity in the left anterior parietal lobe with extensive associated vasogenic edema worrisome for a hemorrhagic mass  MR brain without and with contrast is advised for further evaluation   - GCS 15, non-focal  - Neurosurgery consulted recs appreciated   - MRI brain reveals SAH, unable to rule out underlying mass  - neurosurgery recommends obtaining CT C/A/P and f/u MRI brain in 6 weeks to evaluate for underlying mass lesion  This was discussed with patient and family    - Shade Rm for 7 days for seizure prophylaxis  - Cleared to start lovenox for DVT ppx per neurosurgery  - D/C HOT protocol and change neuro checks to q4h  - Tylenol prn headache  - F/u with neurosurgery in 6 weeks  Will f/u CT C/A/P

## 2023-03-08 NOTE — ASSESSMENT & PLAN NOTE
- CT head on 3/9 showed: 1 4 x 1 0 x 1 2 cm rounded focus of hyperdensity in the left anterior parietal lobe with extensive associated vasogenic edema worrisome for a hemorrhagic mass   MR brain without and with contrast is advised for further evaluation   - GCS 15  - Neurosurgery consulted recs appreciated  - Frequent neuro checks  - Hold AP/AC

## 2023-03-08 NOTE — ASSESSMENT & PLAN NOTE
- Patient fell 3/7 onto left arm  - Xray showed acute proximal humerus fracture   - Patient currently in sling  - NV in tact  - Ortho consulted recs appreciated  - PT/OT when able

## 2023-03-08 NOTE — ASSESSMENT & PLAN NOTE
- Xray showed acute proximal humerus fracture   - Patient currently in sling and NWB  - Appreciate orthopedics eval and recommendations: non-operative management recommended  - NV in tact  - PT/OT  - f/u with orthopedics in 4 weeks

## 2023-03-09 ENCOUNTER — APPOINTMENT (INPATIENT)
Dept: RADIOLOGY | Facility: HOSPITAL | Age: 88
End: 2023-03-09

## 2023-03-09 PROBLEM — I61.9 INTRAPARENCHYMAL HEMORRHAGE OF BRAIN (HCC): Status: ACTIVE | Noted: 2023-03-09

## 2023-03-09 PROBLEM — G89.11 ACUTE PAIN DUE TO TRAUMA: Status: ACTIVE | Noted: 2023-03-09
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
## 2023-03-09 LAB
ABO GROUP BLD: NORMAL
ANION GAP SERPL CALCULATED.3IONS-SCNC: 5 MMOL/L (ref 4–13)
BASOPHILS # BLD AUTO: 0.02 THOUSANDS/ÂΜL (ref 0–0.1)
BASOPHILS NFR BLD AUTO: 0 % (ref 0–1)
BUN SERPL-MCNC: 28 MG/DL (ref 5–25)
CALCIUM SERPL-MCNC: 9.7 MG/DL (ref 8.3–10.1)
CHLORIDE SERPL-SCNC: 102 MMOL/L (ref 96–108)
CHOLEST SERPL-MCNC: 191 MG/DL
CO2 SERPL-SCNC: 28 MMOL/L (ref 21–32)
CREAT SERPL-MCNC: 1.11 MG/DL (ref 0.6–1.3)
EOSINOPHIL # BLD AUTO: 0.03 THOUSAND/ÂΜL (ref 0–0.61)
EOSINOPHIL NFR BLD AUTO: 0 % (ref 0–6)
ERYTHROCYTE [DISTWIDTH] IN BLOOD BY AUTOMATED COUNT: 12.2 % (ref 11.6–15.1)
EST. AVERAGE GLUCOSE BLD GHB EST-MCNC: 123 MG/DL
GFR SERPL CREATININE-BSD FRML MDRD: 45 ML/MIN/1.73SQ M
GLUCOSE SERPL-MCNC: 94 MG/DL (ref 65–140)
HBA1C MFR BLD: 5.9 %
HCT VFR BLD AUTO: 36.3 % (ref 34.8–46.1)
HDLC SERPL-MCNC: 71 MG/DL
HGB BLD-MCNC: 11.9 G/DL (ref 11.5–15.4)
IMM GRANULOCYTES # BLD AUTO: 0.03 THOUSAND/UL (ref 0–0.2)
IMM GRANULOCYTES NFR BLD AUTO: 0 % (ref 0–2)
LDLC SERPL CALC-MCNC: 101 MG/DL (ref 0–100)
LYMPHOCYTES # BLD AUTO: 1.62 THOUSANDS/ÂΜL (ref 0.6–4.47)
LYMPHOCYTES NFR BLD AUTO: 17 % (ref 14–44)
MCH RBC QN AUTO: 31.4 PG (ref 26.8–34.3)
MCHC RBC AUTO-ENTMCNC: 32.8 G/DL (ref 31.4–37.4)
MCV RBC AUTO: 96 FL (ref 82–98)
MONOCYTES # BLD AUTO: 0.99 THOUSAND/ÂΜL (ref 0.17–1.22)
MONOCYTES NFR BLD AUTO: 10 % (ref 4–12)
NEUTROPHILS # BLD AUTO: 7.02 THOUSANDS/ÂΜL (ref 1.85–7.62)
NEUTS SEG NFR BLD AUTO: 73 % (ref 43–75)
NONHDLC SERPL-MCNC: 120 MG/DL
NRBC BLD AUTO-RTO: 0 /100 WBCS
PLATELET # BLD AUTO: 212 THOUSANDS/UL (ref 149–390)
PMV BLD AUTO: 10.5 FL (ref 8.9–12.7)
POTASSIUM SERPL-SCNC: 4.3 MMOL/L (ref 3.5–5.3)
RBC # BLD AUTO: 3.79 MILLION/UL (ref 3.81–5.12)
RH BLD: POSITIVE
SODIUM SERPL-SCNC: 135 MMOL/L (ref 135–147)
TRIGL SERPL-MCNC: 97 MG/DL
WBC # BLD AUTO: 9.71 THOUSAND/UL (ref 4.31–10.16)

## 2023-03-09 RX ORDER — LEVETIRACETAM 500 MG/1
500 TABLET ORAL EVERY 12 HOURS SCHEDULED
Status: DISCONTINUED | OUTPATIENT
Start: 2023-03-09 | End: 2023-03-09

## 2023-03-09 RX ORDER — SACCHAROMYCES BOULARDII 250 MG
250 CAPSULE ORAL 2 TIMES DAILY
Status: DISCONTINUED | OUTPATIENT
Start: 2023-03-09 | End: 2023-03-14 | Stop reason: HOSPADM

## 2023-03-09 RX ORDER — DOCUSATE SODIUM 100 MG/1
100 CAPSULE, LIQUID FILLED ORAL 2 TIMES DAILY
Status: DISCONTINUED | OUTPATIENT
Start: 2023-03-09 | End: 2023-03-09

## 2023-03-09 RX ORDER — ENOXAPARIN SODIUM 100 MG/ML
30 INJECTION SUBCUTANEOUS
Status: DISCONTINUED | OUTPATIENT
Start: 2023-03-09 | End: 2023-03-14 | Stop reason: HOSPADM

## 2023-03-09 RX ORDER — CITALOPRAM 20 MG/1
20 TABLET ORAL DAILY
Status: DISCONTINUED | OUTPATIENT
Start: 2023-03-09 | End: 2023-03-14 | Stop reason: HOSPADM

## 2023-03-09 RX ORDER — LEVETIRACETAM 500 MG/1
500 TABLET ORAL EVERY 12 HOURS SCHEDULED
Status: DISCONTINUED | OUTPATIENT
Start: 2023-03-09 | End: 2023-03-14 | Stop reason: HOSPADM

## 2023-03-09 RX ORDER — LIDOCAINE 50 MG/G
1 PATCH TOPICAL DAILY
Status: DISCONTINUED | OUTPATIENT
Start: 2023-03-09 | End: 2023-03-14 | Stop reason: HOSPADM

## 2023-03-09 RX ORDER — CITALOPRAM 20 MG/1
20 TABLET ORAL DAILY
COMMUNITY

## 2023-03-09 RX ORDER — SENNOSIDES 8.6 MG
2 TABLET ORAL
Status: DISCONTINUED | OUTPATIENT
Start: 2023-03-09 | End: 2023-03-14 | Stop reason: HOSPADM

## 2023-03-09 RX ADMIN — FAMOTIDINE 20 MG: 20 TABLET, FILM COATED ORAL at 08:49

## 2023-03-09 RX ADMIN — DIATRIZOATE MEGLUMINE AND DIATRIZOATE SODIUM 30 ML: 660; 100 LIQUID ORAL; RECTAL at 18:54

## 2023-03-09 RX ADMIN — ACETAMINOPHEN 975 MG: 325 TABLET ORAL at 21:45

## 2023-03-09 RX ADMIN — OXYCODONE HYDROCHLORIDE 5 MG: 5 TABLET ORAL at 08:49

## 2023-03-09 RX ADMIN — BIMATOPROST 1 DROP: 0.1 SOLUTION/ DROPS OPHTHALMIC at 21:44

## 2023-03-09 RX ADMIN — LIDOCAINE 5% 1 PATCH: 700 PATCH TOPICAL at 13:07

## 2023-03-09 RX ADMIN — Medication 250 MG: at 17:42

## 2023-03-09 RX ADMIN — DICYCLOMINE HYDROCHLORIDE 20 MG: 20 TABLET ORAL at 08:49

## 2023-03-09 RX ADMIN — IOHEXOL 70 ML: 350 INJECTION, SOLUTION INTRAVENOUS at 21:13

## 2023-03-09 RX ADMIN — LEVETIRACETAM 500 MG: 500 TABLET, FILM COATED ORAL at 21:45

## 2023-03-09 RX ADMIN — Medication 250 MG: at 13:07

## 2023-03-09 RX ADMIN — CITALOPRAM HYDROBROMIDE 20 MG: 20 TABLET ORAL at 13:07

## 2023-03-09 RX ADMIN — ACETAMINOPHEN 975 MG: 325 TABLET ORAL at 13:06

## 2023-03-09 RX ADMIN — SENNOSIDES 17.2 MG: 8.6 TABLET, FILM COATED ORAL at 21:45

## 2023-03-09 RX ADMIN — LEVETIRACETAM 500 MG: 500 TABLET, FILM COATED ORAL at 08:49

## 2023-03-09 RX ADMIN — ENOXAPARIN SODIUM 30 MG: 30 INJECTION SUBCUTANEOUS at 13:06

## 2023-03-09 RX ADMIN — LEVETIRACETAM 500 MG: 500 TABLET, FILM COATED ORAL at 02:35

## 2023-03-09 RX ADMIN — OXYCODONE HYDROCHLORIDE 5 MG: 5 TABLET ORAL at 17:42

## 2023-03-09 NOTE — PLAN OF CARE
Problem: Potential for Falls  Goal: Patient will remain free of falls  Description: INTERVENTIONS:  - Educate patient/family on patient safety including physical limitations  - Instruct patient to call for assistance with activity   - Consult OT/PT to assist with strengthening/mobility   - Keep Call bell within reach  - Keep bed low and locked with side rails adjusted as appropriate  - Keep care items and personal belongings within reach  - Initiate and maintain comfort rounds  - Make Fall Risk Sign visible to staff  - Offer Toileting every 2 Hours, in advance of need  - Initiate/Maintain bed alarm  - Obtain necessary fall risk management equipment: bed alarm and fall bracelet  - Apply yellow socks and bracelet for high fall risk patients  - Consider moving patient to room near nurses station  Outcome: Progressing     Problem: Nutrition/Hydration-ADULT  Goal: Nutrient/Hydration intake appropriate for improving, restoring or maintaining nutritional needs  Description: Monitor and assess patient's nutrition/hydration status for malnutrition  Collaborate with interdisciplinary team and initiate plan and interventions as ordered  Monitor patient's weight and dietary intake as ordered or per policy  Utilize nutrition screening tool and intervene as necessary  Determine patient's food preferences and provide high-protein, high-caloric foods as appropriate       INTERVENTIONS:  - Monitor oral intake, urinary output, labs, and treatment plans  - Assess nutrition and hydration status and recommend course of action  - Evaluate amount of meals eaten  - Assist patient with eating if necessary   - Allow adequate time for meals  - Recommend/ encourage appropriate diets, oral nutritional supplements, and vitamin/mineral supplements  - Order, calculate, and assess calorie counts as needed  - Recommend, monitor, and adjust tube feedings and TPN/PPN based on assessed needs  - Assess need for intravenous fluids  - Provide specific nutrition/hydration education as appropriate  - Include patient/family/caregiver in decisions related to nutrition  Outcome: Progressing     Problem: MOBILITY - ADULT  Goal: Maintain or return to baseline ADL function  Description: INTERVENTIONS:  -  Assess patient's ability to carry out ADLs; assess patient's baseline for ADL function and identify physical deficits which impact ability to perform ADLs (bathing, care of mouth/teeth, toileting, grooming, dressing, etc )  - Assess/evaluate cause of self-care deficits   - Assess range of motion  - Assess patient's mobility; develop plan if impaired  - Assess patient's need for assistive devices and provide as appropriate  - Encourage maximum independence but intervene and supervise when necessary  - Involve family in performance of ADLs  - Assess for home care needs following discharge   - Consider OT consult to assist with ADL evaluation and planning for discharge  - Provide patient education as appropriate  Outcome: Progressing  Goal: Maintains/Returns to pre admission functional level  Description: INTERVENTIONS:  - Perform BMAT or MOVE assessment daily    - Set and communicate daily mobility goal to care team and patient/family/caregiver  - Collaborate with rehabilitation services on mobility goals if consulted  - Perform Range of Motion 3 times a day  - Reposition patient every 2 hours    - Dangle patient 3 times a day  - Stand patient 3 times a day  - Ambulate patient 3 times a day  - Out of bed to chair 3 times a day   - Out of bed for meals 3 times a day  - Out of bed for toileting  - Record patient progress and toleration of activity level   Outcome: Progressing     Problem: SAFETY ADULT  Goal: Patient will remain free of falls  Description: INTERVENTIONS:  - Educate patient/family on patient safety including physical limitations  - Instruct patient to call for assistance with activity   - Consult OT/PT to assist with strengthening/mobility   - Keep Call bell within reach  - Keep bed low and locked with side rails adjusted as appropriate  - Keep care items and personal belongings within reach  - Initiate and maintain comfort rounds  - Make Fall Risk Sign visible to staff  - Offer Toileting every 2 Hours, in advance of need  - Initiate/Maintain bed alarm  - Obtain necessary fall risk management equipment:   - Apply yellow socks and bracelet for high fall risk patients  - Consider moving patient to room near nurses station  Outcome: Progressing  Goal: Maintain or return to baseline ADL function  Description: INTERVENTIONS:  -  Assess patient's ability to carry out ADLs; assess patient's baseline for ADL function and identify physical deficits which impact ability to perform ADLs (bathing, care of mouth/teeth, toileting, grooming, dressing, etc )  - Assess/evaluate cause of self-care deficits   - Assess range of motion  - Assess patient's mobility; develop plan if impaired  - Assess patient's need for assistive devices and provide as appropriate  - Encourage maximum independence but intervene and supervise when necessary  - Involve family in performance of ADLs  - Assess for home care needs following discharge   - Consider OT consult to assist with ADL evaluation and planning for discharge  - Provide patient education as appropriate  Outcome: Progressing  Goal: Maintains/Returns to pre admission functional level  Description: INTERVENTIONS:  - Perform BMAT or MOVE assessment daily    - Set and communicate daily mobility goal to care team and patient/family/caregiver  - Collaborate with rehabilitation services on mobility goals if consulted  - Perform Range of Motion 3 times a day  - Reposition patient every 2 hours    - Dangle patient 3 times a day  - Stand patient 3 times a day  - Ambulate patient 3 times a day  - Out of bed to chair 3 times a day   - Out of bed for meals 3 times a day  - Out of bed for toileting  - Record patient progress and toleration of activity level   Outcome: Progressing

## 2023-03-09 NOTE — ASSESSMENT & PLAN NOTE
• Patient presented s/p fall at home in the driveway   • H/o Breast CA but no other cancer   • No AC/AP reported   • Patient daughter reports 3 weeks of progressive memory and balance trouble     Imaging:   • CT head 3/8/2023: 1 4 x 1 x 1 2 cm rounded focus of hyperdensity in the left posterior frontal lobe with associated edema concerning for underlying mass  • MRI brain 3/8/2023: Subacute hemorrhage within the left parafalcine frontal lobe with moderate edema and local mass effect  No mass or acute ischemia identified within the adjacent brain parenchyma  Recommend repeat examination as hemorrhage continues to resolve to exclude underlying lesion  Plan:   · ongoing frequent neurological checks  · Recommend STAT CT head for decline in GCS >2 points in 1 hour  · No further cranial imaging required at this time  · Recommend MRI brain in a delayed fashion to evaluate for underlying mass  · Brain mass has not been confirmed at this time  · Keppra for seizure ppx per trauma team   · DVT ppx: SCD's only  Cleared for pharm dvt ppx from neurosurgery standpoint at this time   · Hold all AC/AP medication at this time  · PT/OT evaluation  · Ongoing medical management and pain control per primary team  · CM following for dispo planning  · CT CAP ordered to stage given concern for underlying pathology intracranially  · This is unlikely to  from neurosurgical standpoint however should be completed to evaluate remaining visceral organs in setting of patient with history of breast CA  · Neurosurgery will plan to follow up with patient in the outpatient setting in about 6 week with repeat MRI brain with and without contrast  Call with questions or concerns

## 2023-03-09 NOTE — CONSULTS
Consultation - Sanaz Shawn Dee 82 y o  female MRN: [id number]  [facility] Encounter: [id number]      Assessment/Plan    Ambulatory dysfunction  At risk for falls secondary to age, hx of fall, gait instability, visual impairment  Fall precautions  PT/OT  Increased physical exercise, recommend balance and strengthening exercises  Rehab post hospitalization     Acute pain due to trauma  Geriatric pain protocol  Scheduled acetaminophen 975 mg po Q8  Oxycodone 2 5 mg po Q 4 prn moderate pain  Oxycodone 5 mg po Q 4 prn severe pain   Monitor for constipation  Lidoderm patch      Frailty  Mild/moderately frail  Lives alone, has supportive daughter  Albumin 4 1, maintain protien in diet  Weight loss in past couple months  PT/OT  Likely will need increase home support     Cognitive screening  No reported prior memory issues  Recommend check TSH and vitamin B12  CT head (3/8/23) moderate microangiopathic changes  Keep physically, mentally and socially active     Delirium precautions  Baseline: alert and oriented x 3  Provide frequent redirection, reorientation, distraction techniques  Avoid deliriogenic medications such as tramadol, benzodiazepines, anticholinergics,  Benadryl  Treat pain, See geriatric pain protocol  Monitor for constipation and urinary retention  Encourage early and frequent moblization, OOB  Encourage Hydration/ Nutrition  Implement sleep hygiene, limit night time interuptions, group activities     Insomnia  Related to hospitalization  First line is behavioral therapy  Avoid sedative hypnotics such as benzodiazepines and benadryl  Encourage staying awake during the day  Encourage daytime activity, morning exercise  Decrease or eliminate day time naps  Avoid caffeine especially during late afternoon and evening hours  Establish a night time routine    Left proximal humerus fracture  S/p fall  Ortho on consult  MINDY  Geriatric pain protocol  PT/OT    Brain mass  Seen on CT head  Neurosurgery on consult  MRI pending  keppra started for seizure ppx    [address]  DNR/ DNI    Home medication review  [address] , spoke with pharmacist to confirm  Refills current  citalopram 20 mg po daily  Lantoprost 0 005%              History of Present Illness   Physician Requesting Consult: Clair Guevara,*  Reason for Consult / Principal Problem: fall, ISAR 4  Hx and PE limited by: NA  HPI: Fritz Godfrey is a 82y o  year old female who presents to [facility] with being found on the ground of her bedroom  She reports she was walking in her driveway when she fell  She hit her head and her left arm  She did not go to the hospital at that time  She was transferred to [facility] for further evaluation  She has brain cancer, anxiety, GERD    Prior to arrival pt lives at home alone  She drives  She is independent with IADLs and ADLs  She ambulates independently  She reports 1-2 prior falls recently  She reports weight loss since December  She denies insomnia, issues with bowel or bladder  She denies memory issues  Daughter at bedside to review HPI and medication  She reports that she has seen a change in her mom in the past 3 weeks  She has been having difficulty with memory  Upon exam pt is lying in bed  She is alert and oriented x 3, calm and cooperative  Inpatient consult to Gerontology  Consult performed by: JACQUES Dias  Consult ordered by: Bar Segundo DO          Review of Systems   Constitutional: Positive for unexpected weight change  HENT: Positive for hearing loss  Eyes: Negative for visual disturbance  Respiratory: Negative for cough  Cardiovascular: Negative for chest pain  Gastrointestinal: Negative for constipation  Genitourinary: Negative for difficulty urinating  Musculoskeletal: Positive for gait problem  Neurological: Positive for weakness     Psychiatric/Behavioral: Negative for sleep disturbance         Historical Information   Past Medical History:   Diagnosis Date   • Breast cancer ([facility] [address] ) 2007   • Cellulitis of left upper arm    • History of anxiety    • History of appendicitis    • History of gastroesophageal reflux (GERD)      Past Surgical History:   Procedure Laterality Date   • APPENDECTOMY      Date Unknown   • CATARACT EXTRACTION Bilateral     Left 2008 // Right 2008   •  SECTION      Date Unknown   • HYSTERECTOMY     • MASTECTOMY Right 2007   • PARTIAL HYSTERECTOMY      fallopian tube removed, ? side   • TUBAL LIGATION      Date Unknown     Social History   Social History     Substance and Sexual Activity   Alcohol Use No     Social History     Substance and Sexual Activity   Drug Use No     Social History     Tobacco Use   Smoking Status Never   Smokeless Tobacco Never         Family History:   Family History   Problem Relation Age of Onset   • Lung cancer Mother    • Colon cancer Sister    • Hypertension Family    • No Known Problems Father    • No Known Problems Daughter    • No Known Problems Maternal Grandmother    • No Known Problems Maternal Grandfather    • No Known Problems Paternal Grandmother    • No Known Problems Paternal Grandfather    • No Known Problems Sister    • Drug abuse Neg Hx        Meds/Allergies   Current meds:   Current Facility-Administered Medications   Medication Dose Route Frequency   • acetaminophen (TYLENOL) tablet 975 mg  975 mg Oral Q8H [facility]   • bimatoprost (LUMIGAN) 0 01 % ophthalmic solution 1 drop  1 drop Both Eyes HS   • dicyclomine (BENTYL) tablet 20 mg  20 mg Oral BID   • docusate sodium (COLACE) capsule 100 mg  100 mg Oral BID   • famotidine (PEPCID) tablet 20 mg  20 mg Oral Daily   • levETIRAcetam (KEPPRA) tablet 500 mg  500 mg Oral Q12H [facility]   • naloxone (NARCAN) 0 04 mg/mL syringe 0 04 mg  0 04 mg Intravenous Q1MIN PRN   • ondansetron (ZOFRAN) injection 4 mg  4 mg Intravenous Q6H PRN   • oxyCODONE (ROXICODONE) IR tablet 2 5 mg  2 5 mg Oral Q4H PRN   • oxyCODONE (ROXICODONE) IR tablet 5 mg  5 mg Oral Q4H PRN          Allergies   Allergen Reactions   • Medical Tape    • Other Itching     Adhesive Tape       Objective   Vitals: Blood pressure 133/90, pulse 67, temperature 98 1 °F (36 7 °C), resp  rate 16, weight 43 5 kg (96 lb), SpO2 99 %  ,Body mass index is 20 77 kg/m²  Physical Exam  Vitals and nursing note reviewed  HENT:      Head: Normocephalic  Nose: No congestion  Mouth/Throat:      Mouth: Mucous membranes are moist    Eyes:      General:         Right eye: No discharge  Left eye: No discharge  Cardiovascular:      Rate and Rhythm: Normal rate and regular rhythm  Pulses: Normal pulses  Pulmonary:      Effort: Pulmonary effort is normal       Breath sounds: Normal breath sounds  Abdominal:      General: Bowel sounds are normal       Palpations: Abdomen is soft  Musculoskeletal:         General: Normal range of motion  Cervical back: Normal range of motion  Skin:     General: Skin is warm and dry  Neurological:      Mental Status: She is alert and oriented to person, place, and time  Mental status is at baseline  Psychiatric:         Mood and Affect: Mood normal          Lab Results:   Results from last 7 days   Lab Units 03/09/23  0839   WBC Thousand/uL 9 71   HEMOGLOBIN g/dL 11 9   HEMATOCRIT % 36 3   PLATELETS Thousands/uL 212        Results from last 7 days   Lab Units 03/09/23  0839 03/08/23  1306   POTASSIUM mmol/L 4 3 3 8   CHLORIDE mmol/L 102 98   CO2 mmol/L 28 30   BUN mg/dL 28* 29*   CREATININE mg/dL 1 11 1 12   CALCIUM mg/dL 9 7 9 7   ALK PHOS U/L  --  97   ALT U/L  --  16   AST U/L  --  24       Imaging Studies: I have personally reviewed pertinent reports  EKG, Pathology, and Other Studies: I have personally reviewed pertinent reports      VTE Prophylaxis: Sequential compression device (Venodyne)     Code Status: Level 3 - DNAR and DNI

## 2023-03-09 NOTE — DISCHARGE INSTR - AVS FIRST PAGE
Discharge Instructions - Lizz Dee 82 y o  female MRN: [id number]  Unit/Bed#: [facility] 609-01    Weight Bearing Status:                                           Do not bear weight on your left arm  Keep it in a sling for 1 week  During this time, ensure your are moving your elbow and wrist periodically to prevent stiffness  After one week, you should start to see physical therapy and being shoulder range of motion exercises including pendulums  DVT prophylaxis  None    Pain:  Continue analgesics as directed  Dressing Instructions:   None  Appt Instructions: If you do not have your appointment, please call the clinic at [phone] to schedule follow up with orthopedics in 2 weeks  Otherwise followup as scheduled     Contact the office sooner if you experience any increased numbness/tingling in the extremities        Miscellaneous:  None

## 2023-03-09 NOTE — ASSESSMENT & PLAN NOTE
- mechanical fall in driveway  - sustained below stated injury  - PT/OT evaluations recommending inpatient rehab  - CM for dispo planning

## 2023-03-09 NOTE — PROGRESS NOTES
[address] [facility]  Progress Note - Mackenzie Junior 1935, 82 y o  female MRN: [id number]  [facility] Encounter: [id number]  Primary Care Provider: Angelo Lin DO   Date and time admitted to hospital: 3/8/2023  4:39 PM    Fall  Assessment & Plan  - mechanical fall in driveway  - sustained below stated injury  - PT/OT evaluations recommending inpatient rehab  - CM for dispo planning    * Pathological fracture, left humerus, initial encounter for fracture  Assessment & Plan  - Xray showed acute proximal humerus fracture   - Patient currently in sling and NWB  - Appreciate orthopedics eval and recommendations: non-operative management recommended  - NV in tact  - PT/OT  - f/u with orthopedics in 4 weeks    Brain mass  Assessment & Plan  - CT head on 3/9 showed: 1 4 x 1 0 x 1 2 cm rounded focus of hyperdensity in the left anterior parietal lobe with extensive associated vasogenic edema worrisome for a hemorrhagic mass  MR brain without and with contrast is advised for further evaluation   - GCS 15, non-focal  - Neurosurgery consulted recs appreciated   - MRI brain reveals SAH, unable to rule out underlying mass  - neurosurgery recommends obtaining CT C/A/P and f/u MRI brain in 6 weeks to evaluate for underlying mass lesion  This was discussed with patient and family    - Shade Rm for 7 days for seizure prophylaxis  - Cleared to start lovenox for DVT ppx per neurosurgery  - D/C HOT protocol and change neuro checks to q4h  - Tylenol prn headache  - F/u with neurosurgery in 6 weeks  Will f/u CT C/A/P  Acute pain due to trauma  Assessment & Plan  - Tylenol prn headache      TERTIARY TRAUMA SURVEY    Bowel Regimen: colace, senna  VTE Prophylaxis:Sequential compression device (Venodyne)  and Enoxaparin (Lovenox)     Disposition: down-grade to Medsurg and d/c HOT  F/u CT C/A/P  Rehab placement pending       Subjective   Chief Complaint: "I'm feeling well"    Subjective: Patient reports overall she is feeling well  She does have pain in the left arm  She feels the pain medications are helping  She is aware of the possible mass in her brain  She admits to having some loss of balance and intermittent confusion over the last several weeks  No new complaints on tertiary exam       Objective   Vitals:   Temp:  [97 6 °F (36 4 °C)-98 6 °F (37 °C)] 97 6 °F (36 4 °C)  HR:  [63-80] 63  Resp:  [16-21] 16  BP: (109-173)/(46-90) 109/46    I/O       03/07 0701  03/08 0700 03/08 0701  03/09 0700 03/09 0701  03/10 0700    P  O    278    Total Intake(mL/kg)   278 (6 4)    Urine (mL/kg/hr)   925 (4 1)    Stool   0    Total Output   925    Net   -647           Unmeasured Urine Occurrence   2 x    Unmeasured Stool Occurrence  1 x 1 x           Physical Exam:   GENERAL APPEARANCE: NAD  NEURO: GCS 15,non-focal  HEENT: NCAT  CV: RRR, no MGR  LUNGS: CTA bilaterally  GI: soft,non-tender,non-distended  : voiding  MSK: + LUE in sling, NVI distally  No other edema, contusion or deformities  SKIN: pink,w arm,d ry    Invasive Devices     Peripheral Intravenous Line  Duration           Peripheral IV 03/08/23 Right Wrist <1 day                      Lab Results:   Results: I have personally reviewed all pertinent laboratory/tests results, BMP/CMP:   Lab Results   Component Value Date    SODIUM 135 03/09/2023    K 4 3 03/09/2023     03/09/2023    CO2 28 03/09/2023    BUN 28 (H) 03/09/2023    CREATININE 1 11 03/09/2023    CALCIUM 9 7 03/09/2023    AST 24 03/08/2023    ALT 16 03/08/2023    ALKPHOS 97 03/08/2023    EGFR 45 03/09/2023    and CBC:   Lab Results   Component Value Date    WBC 9 71 03/09/2023    HGB 11 9 03/09/2023    HCT 36 3 03/09/2023    MCV 96 03/09/2023     03/09/2023    MCH 31 4 03/09/2023    MCHC 32 8 03/09/2023    RDW 12 2 03/09/2023    MPV 10 5 03/09/2023    NRBC 0 03/09/2023     Imaging: I have personally reviewed pertinent reports       3/8 MRI brain: Subacute hemorrhage within the left parafalcine frontal lobe with moderate surrounding vasogenic edema and localized mass effect  No mass or acute ischemia identified within the adjacent brain parenchyma  Recommend repeat examination as hemorrhage   continues to resolve to exclude occult underlying lesion      Mild chronic microangiopathic change elsewhere within the brain    Other Studies: no new

## 2023-03-09 NOTE — PROGRESS NOTES
Progress Note - Orthopedics   [facility] 82 y o  female MRN: [id number]  [facility]      Subjective:    82 y  o female w L prox humerus fx s/p syncopal fall x 2  No acute events, no new complaints  Patient stable  Pain controlled while immobilized in sling      Labs:  0   Lab Value Date/Time    HCT 35 4 03/08/2023 1306    HCT 40 9 03/02/2023 1541    HCT 34 6 (L) 11/03/2021 1053    HCT 39 9 05/20/2015 0826    HCT 40 5 06/02/2014 0846    HGB 12 1 03/08/2023 1306    HGB 13 5 03/02/2023 1541    HGB 11 0 (L) 11/03/2021 1053    HGB 13 4 05/20/2015 0826    HGB 13 2 06/02/2014 0846    INR 1 02 03/08/2023 1306    WBC 9 66 03/08/2023 1306    WBC 5 62 03/02/2023 1541    WBC 5 69 11/03/2021 1053    WBC 5 68 05/20/2015 0826    WBC 6 10 06/02/2014 0846       Meds:    Current Facility-Administered Medications:   •  acetaminophen (TYLENOL) tablet 975 mg, 975 mg, Oral, Q8H [address], Ilia Redmargarita, DO, 975 mg at 03/08/23 2206  •  bimatoprost (LUMIGAN) 0 01 % ophthalmic solution 1 drop, 1 drop, Both Eyes, HS, Ilia Reddish, DO, 1 drop at 03/08/23 2206  •  dicyclomine (BENTYL) tablet 20 mg, 20 mg, Oral, BID, [place] Briscoe Palladino, DO, 20 mg at 03/08/23 2206  •  docusate sodium (COLACE) capsule 100 mg, 100 mg, Oral, BID, [place] Briscoe Palladino, DO, 100 mg at 03/08/23 1835  •  famotidine (PEPCID) tablet 20 mg, 20 mg, Oral, Daily, [place] Briscoe Palladino, DO  •  levETIRAcetam (KEPPRA) tablet 500 mg, 500 mg, Oral, Q12H [address], Radha Prather MD, 500 mg at 03/09/23 0235  •  naloxone (NARCAN) 0 04 mg/mL syringe 0 04 mg, 0 04 mg, Intravenous, Q1MIN PRN, [place] Juana Laytonadino, DO  •  ondansetron (ZOFRAN) injection 4 mg, 4 mg, Intravenous, Q6H PRN, [place] Juana Palladino, DO  •  oxyCODONE (ROXICODONE) IR tablet 2 5 mg, 2 5 mg, Oral, Q4H PRN, [place] Juana Palladino, DO  •  oxyCODONE (ROXICODONE) IR tablet 5 mg, 5 mg, Oral, Q4H PRN, [place] Juana Palladino, DO    Blood Culture:   No results found for: BLOODCX    Wound Culture:   No results found for: WOUNDCULT    Ins and Outs:  No intake/output data recorded  Physical:  Vitals:    03/09/23 0154   BP: 134/58   Pulse: 65   Resp: 18   Temp: 97 8 °F (36 6 °C)   SpO2: 99%     Musculoskeletal: left Upper Extremity  · Skin intact  Significant ecchymosis to proximal arm medially  · Dressing - in sling  · TTP  · Sensation intact to median/radial/ulnar nerve distribution   · Motor intact anterior interosseous nerve/posterior interosseous nerve/median/radial/ulnar nerve distributions  · Palpable radial pulse, symmetric bilaterally  · Digits warm and well perfused  · Capillary refill < 2 seconds    Assessment:    82 y  o female s/p syncopal fall w L proximal humerus fx  Patient doing well       Plan:  · NWB LUE in sling  · Will monitor for ABLA - Greater than 2 gram drop which qualifies for diagnosis of acute blood loss anemia, will monitor and administer IVF/prbc as indicated   · PT/OT as appropriate  · Pain control multimodal  · DVT ppx while in hospital  · Rest of care per primary  · Can follow up in 4 weeks w orthopedics  · Dispo: Ortho will follow    Tommie Moore MD

## 2023-03-09 NOTE — PLAN OF CARE
Problem: OCCUPATIONAL THERAPY ADULT  Goal: Performs self-care activities at highest level of function for planned discharge setting  See evaluation for individualized goals  Description: Treatment Interventions: ADL retraining, Functional transfer training, UE strengthening/ROM, Patient/family training, Equipment evaluation/education, Compensatory technique education, Activityengagement          See flowsheet documentation for full assessment, interventions and recommendations  Note: Limitation: Decreased ADL status, Decreased UE ROM, Decreased Safe judgement during ADL, Decreased cognition, Decreased endurance, Decreased self-care trans, Decreased high-level ADLs, Non-func L UE  Prognosis: Good, Fair  Assessment: Pt is a 82 y o  female who was admitted to [facility] on 3/8/2023 with Pathological fracture, left humerus, initial encounter for fracture 2* fall - found to have brain mass   Pt's problem list also includes PMH of previous surgery, cancer history and breast C, anxiety, appendicitis, GERD  At baseline pt was completing adls and mobility independently w/o ad - family assists with iadls prn  Pt lives alone in 2 story home with 3 [facility] and [employer]  Currently pt requires moderate assist for overall ADLS and min assist for functional mobility/transfers  Pt currently presents with impairments in the following categories -steps to enter environment, limited home support, difficulty performing ADLS, difficulty performing IADLS , limited insight into deficits, health management  and environment activity tolerance, endurance, standing balance/tolerance, UE ROM, memory, insight, safety , judgement , attention  and sequencing    These impairments, as well as pt's fatigue, pain, orthopedic restricitions , WBS , decreased caregiver support, risk for falls and home environment  limit pt's ability to safely engage in all baseline areas of occupation, includinggrooming, bathing, dressing, toileting, functional mobility/transfers, community mobility, laundry , house maintenance, medication management, meal prep, cleaning, social participation  and leisure activities  From OT standpoint, recommend inpt rehab upon D/C  OT will continue to follow to address the below stated goals       OT Discharge Recommendation: Post acute rehabilitation services

## 2023-03-09 NOTE — CONSULTS
[address] 1935, 82 y o  female MRN: [id number]  Unit/Bed#: [facility] 609-01 Encounter: [id number]  Primary Care Provider: Mary Saucedo DO   Date and time admitted to hospital: 3/8/2023  4:39 PM    Inpatient consult to Neurosurgery  Consult performed by: Marylou Olivas PA-C  Consult ordered by: Froilan Linares DO          Intraparenchymal hemorrhage of brain [facility])  Assessment & Plan  • Patient presented s/p fall at home in the driveway   • H/o Breast CA but no other cancer   • No AC/AP reported   • Patient daughter reports 3 weeks of progressive memory and balance trouble     Imaging:   • CT head 3/8/2023: 1 4 x 1 x 1 2 cm rounded focus of hyperdensity in the left posterior frontal lobe with associated edema concerning for underlying mass  • MRI brain 3/8/2023: Subacute hemorrhage within the left parafalcine frontal lobe with moderate edema and local mass effect  No mass or acute ischemia identified within the adjacent brain parenchyma  Recommend repeat examination as hemorrhage continues to resolve to exclude underlying lesion  Plan:   · ongoing frequent neurological checks  · Recommend STAT CT head for decline in GCS >2 points in 1 hour  · No further cranial imaging required at this time  · Recommend MRI brain in a delayed fashion to evaluate for underlying mass  · Brain mass has not been confirmed at this time  · Keppra for seizure ppx per trauma team   · DVT ppx: SCD's only  Cleared for pharm dvt ppx from neurosurgery standpoint at this time   · Hold all AC/AP medication at this time  · PT/OT evaluation  · Ongoing medical management and pain control per primary team  · CM following for dispo planning  · CT CAP ordered to stage given concern for underlying pathology intracranially     · This is unlikely to  from neurosurgical standpoint however should be completed to evaluate remaining visceral organs in setting of patient with history of breast CA  · Neurosurgery will plan to follow up with patient in the outpatient setting in about 6 week with repeat MRI brain with and without contrast  Call with questions or concerns  Breast cancer ([facility] [address] )  Assessment & Plan  · H/o breast cancer   · S/p mastectomy   · No adjuvant therapy such as chemo or radiation     * Pathological fracture, left humerus, initial encounter for fracture  Assessment & Plan  · Orthopedics following   · Currently in sling       History of Present Illness   HPI: Nimesh Gilliam is a 82 y o  female with PMH including breast cancer, anxiety who presents to the hospital after a fall while ambulating in the driveway  Patient fell forward without stretched arms  She suffered a L humeral fracture  On presenting CT imaging, patient was noted to have a hemorrhage in the L frontal lobe  She has no issues or concerns at this time aside from her L arm pain  Patient daughter reports that she has seen some changes/decline over the past 3 weeks  She noted some trouble with speech, word finding trouble, balance trouble and difficulty identifying some people  It has been slowly progressive  Currently patient is alert and appears to comprehend her situation  She is oriented to person, place and pain  Patient has a history of breast cancer for which she had a mastectomy  No adjuvant therapy such as chemo or radiation  No other cancer history  No history of melanoma or abnomral skin lesions  Review of Systems   Constitutional: Negative for appetite change, fatigue and fever  HENT: Negative for hearing loss, trouble swallowing and voice change  Eyes: Negative for photophobia and visual disturbance  Respiratory: Negative for cough, chest tightness and shortness of breath  Cardiovascular: Negative for chest pain and leg swelling  Genitourinary: Negative for difficulty urinating     Musculoskeletal: Positive for arthralgias (L arm) and gait problem  Negative for back pain, neck pain and neck stiffness  Skin: Negative for rash and wound  Neurological: Negative for dizziness, tremors, weakness, light-headedness and numbness  Psychiatric/Behavioral: Positive for confusion  Negative for agitation and behavioral problems  The patient is not nervous/anxious          Historical Information   Past Medical History:   Diagnosis Date   • Breast cancer ([facility] [address] ) 2007   • Cellulitis of left upper arm    • History of anxiety    • History of appendicitis    • History of gastroesophageal reflux (GERD)      Past Surgical History:   Procedure Laterality Date   • APPENDECTOMY      Date Unknown   • CATARACT EXTRACTION Bilateral     Left 2008 // Right 2008   •  SECTION      Date Unknown   • HYSTERECTOMY     • MASTECTOMY Right 2007   • PARTIAL HYSTERECTOMY      fallopian tube removed, ? side   • TUBAL LIGATION      Date Unknown     Social History     Substance and Sexual Activity   Alcohol Use No     Social History     Substance and Sexual Activity   Drug Use No     Social History     Tobacco Use   Smoking Status Never   Smokeless Tobacco Never     Family History   Problem Relation Age of Onset   • Lung cancer Mother    • Colon cancer Sister    • Hypertension Family    • No Known Problems Father    • No Known Problems Daughter    • No Known Problems Maternal Grandmother    • No Known Problems Maternal Grandfather    • No Known Problems Paternal Grandmother    • No Known Problems Paternal Grandfather    • No Known Problems Sister    • Drug abuse Neg Hx        Meds/Allergies   all current active meds have been reviewed, current meds:   Current Facility-Administered Medications   Medication Dose Route Frequency   • acetaminophen (TYLENOL) tablet 975 mg  975 mg Oral Q8H [facility]   • bimatoprost (LUMIGAN) 0 01 % ophthalmic solution 1 drop  1 drop Both Eyes HS   • citalopram (CeleXA) tablet 20 mg  20 mg Oral Daily   • docusate sodium (COLACE) capsule 100 mg 100 mg Oral BID   • enoxaparin (LOVENOX) subcutaneous injection 30 mg  30 mg Subcutaneous Q24H TEENA   • levETIRAcetam (KEPPRA) tablet 500 mg  500 mg Oral Q12H TEENA   • lidocaine (LIDODERM) 5 % patch 1 patch  1 patch Topical Daily   • naloxone (NARCAN) 0 04 mg/mL syringe 0 04 mg  0 04 mg Intravenous Q1MIN PRN   • ondansetron (ZOFRAN) injection 4 mg  4 mg Intravenous Q6H PRN   • oxyCODONE (ROXICODONE) IR tablet 2 5 mg  2 5 mg Oral Q4H PRN   • oxyCODONE (ROXICODONE) IR tablet 5 mg  5 mg Oral Q4H PRN   • saccharomyces boulardii (FLORASTOR) capsule 250 mg  250 mg Oral BID   • senna (SENOKOT) tablet 17 2 mg  2 tablet Oral HS    and PTA meds:   Prior to Admission Medications   Prescriptions Last Dose Informant Patient Reported? Taking? Docusate Sodium (COLACE PO)   Yes No   Sig: Take by mouth   Probiotic Product (PROBIOTIC DAILY PO)   Yes No   Sig: Take by mouth   bimatoprost (LUMIGAN) 0 01 % ophthalmic drops   Yes No   Sig: Administer 1 drop to both eyes daily at bedtime   citalopram (CeleXA) 20 mg tablet   Yes Yes   Sig: Take 20 mg by mouth daily   dicyclomine (BENTYL) 20 mg tablet   No No   Sig: Take 1 tablet (20 mg total) by mouth 2 (two) times a day   dorzolamide (TRUSOPT) 2 % ophthalmic solution   Yes No   Sig: Administer 1 drop to both eyes    famotidine (PEPCID) 20 mg tablet   No No   Sig: Take 1 tablet (20 mg total) by mouth daily      Facility-Administered Medications: None     Allergies   Allergen Reactions   • Medical Tape    • Other Itching     Adhesive Tape       Objective   I/O       03/07 0701 03/08 0700 03/08 0701 03/09 0700 03/09 0701  03/10 0700    P  O    278    Total Intake(mL/kg)   278 (6 4)    Urine (mL/kg/hr)   925 (3 1)    Stool   0    Total Output   925    Net   -647           Unmeasured Urine Occurrence   2 x    Unmeasured Stool Occurrence  1 x 1 x          Physical Exam  Constitutional:       Appearance: Normal appearance  She is well-developed  HENT:      Head: Normocephalic and atraumatic  Eyes:      Extraocular Movements: Extraocular movements intact and EOM normal    Neck:      Vascular: No JVD  Trachea: No tracheal deviation  Cardiovascular:      Rate and Rhythm: Normal rate  Pulmonary:      Effort: Pulmonary effort is normal  No respiratory distress  Musculoskeletal:         General: Tenderness (L shoulder) present  No deformity  Normal range of motion  Cervical back: Normal range of motion and neck supple  Skin:     General: Skin is warm and dry  Neurological:      Mental Status: She is alert and oriented to person, place, and time  Cranial Nerves: No cranial nerve deficit  Sensory: No sensory deficit  Motor: No weakness  Gait: Gait is intact  Deep Tendon Reflexes: Reflexes are normal and symmetric  Reflex Scores:       Bicep reflexes are 2+ on the right side and 2+ on the left side  Brachioradialis reflexes are 2+ on the right side and 2+ on the left side  Patellar reflexes are 2+ on the right side and 2+ on the left side  Psychiatric:         Speech: Speech normal          Behavior: Behavior normal          Thought Content: Thought content normal        Neurologic Exam     Mental Status   Oriented to person, place, and time  Attention: normal    Speech: speech is normal   Level of consciousness: alert  Knowledge: good  Normal comprehension  Cranial Nerves     CN III, IV, VI   Extraocular motions are normal    Upgaze: normal  Downgaze: normal    CN V   Facial sensation intact  CN VII   Facial expression full, symmetric       CN VIII   CN VIII normal    Hearing: intact    Motor Exam   Muscle bulk: normal  Right arm tone: normal  Left arm tone: normal  Right leg tone: normal  Left leg tone: normal    Strength   Right deltoid: 5/5  Left deltoid: 5/5  Right biceps: 5/5  Left biceps: 5/5  Right triceps: 5/5  Left triceps: 5/5  Right wrist flexion: 5/5  Left wrist flexion: 5/5  Right wrist extension: 5/5  Left wrist extension: 5/5  Right iliopsoas: 5/5  Left iliopsoas: 5/5  Right quadriceps: 5/5  Left quadriceps: 5/5  Right hamstrin/5  Left hamstrin/5  Right anterior tibial: 5/5  Left anterior tibial: 5/5  Right gastroc: 5/5  Left gastroc: 5/5    Sensory Exam   Light touch normal      Gait, Coordination, and Reflexes     Gait  Gait: normal    Tremor   Resting tremor: absent  Action tremor: absent    Reflexes   Right brachioradialis: 2+  Left brachioradialis: 2+  Right biceps: 2+  Left biceps: 2+  Right patellar: 2+  Left patellar: 2+  Right Sanford: absent  Left Sanford: absent  Right ankle clonus: absent  Left ankle clonus: absent    Vitals:Blood pressure (!) 109/46, pulse 63, temperature 97 6 °F (36 4 °C), resp  rate 16, weight 43 5 kg (96 lb), SpO2 97 %  ,Body mass index is 20 77 kg/m²  Lab Results:   Results from last 7 days   Lab Units 23  0839 23  1306 23  1541   WBC Thousand/uL 9 71 9 66 5 62   HEMOGLOBIN g/dL 11 9 12 1 13 5   HEMATOCRIT % 36 3 35 4 40 9   PLATELETS Thousands/uL 212 214 250   NEUTROS PCT % 73 79*  --    MONOS PCT % 10 12  --      Results from last 7 days   Lab Units 23  0839 23  1306 23  1541   POTASSIUM mmol/L 4 3 3 8 4 8   CHLORIDE mmol/L 102 98 98   CO2 mmol/L 28 30 24   BUN mg/dL 28* 29* 32*   CREATININE mg/dL 1 11 1 12 0 98   CALCIUM mg/dL 9 7 9 7 9 9   ALK PHOS U/L  --  97 104   ALT U/L  --  16 15   AST U/L  --  24 19             Results from last 7 days   Lab Units 23  1306   INR  1 02   PTT seconds 24     No results found for: TROPONINT  ABG:No results found for: PHART, TIE2OJZ, PO2ART, MRV5SZO, M7HATGWH, BEART, SOURCE    Imaging Studies: I have personally reviewed pertinent reports  and I have personally reviewed pertinent films in PACS    XR chest 1 view portable    Result Date: 3/8/2023  Impression: 1  Comminuted, retracted fracture of the left femoral neck  2   No acute cardiopulmonary findings   Note: I personally discussed this study with SEE D Christyne Knife on 3/8/2023 at 3:42 PM  Workstation performed: [id number]     XR shoulder 1 vw left    Result Date: 3/9/2023  Impression: Comminuted impacted fracture of the humeral head and neck is seen  IMPRESSION: No dislocation  Workstation performed: [id number]     XR humerus LEFT    Result Date: 3/8/2023  Impression: Acute left proximal humerus fracture  Workstation performed: [id number]     XR elbow 2 vw left    Result Date: 3/9/2023  Impression: No acute osseous abnormality  Workstation performed: [id number]     XR knee 4+ vw left injury    Result Date: 3/9/2023  Impression: No acute osseous abnormality  Workstation performed: [id number]     XR knee 4+ vw right injury    Result Date: 3/9/2023  Impression: No acute osseous abnormality  Degenerative changes as described  Workstation performed: [id number]     CT head without contrast    Addendum Date: 3/9/2023    ADDENDUM: Please note the following correction to the report: The hemorrhage is in the left frontal lobe, not parietal lobe  Result Date: 3/9/2023  Impression: 1 4 x 1 0 x 1 2 cm rounded focus of hyperdensity in the left anterior parietal lobe with extensive associated vasogenic edema worrisome for a hemorrhagic mass  MR brain without and with contrast is advised for further evaluation  I personally discussed this study with Teresa Brito on 3/8/2023 at 2:03 PM  Workstation performed: [id number]     CT spine cervical without contrast    Result Date: 3/8/2023  Impression: No acute cervical spine fracture or traumatic malalignment  Workstation performed: [id number]     MRI brain w wo contrast    Result Date: 3/9/2023  Impression: Subacute hemorrhage within the left parafalcine frontal lobe with moderate surrounding vasogenic edema and localized mass effect  No mass or acute ischemia identified within the adjacent brain parenchyma  Recommend repeat examination as hemorrhage continues to resolve to exclude occult underlying lesion  Mild chronic microangiopathic change elsewhere within the brain  Workstation performed: [id number]       EKG, Pathology, and Other Studies: I have personally reviewed pertinent reports  VTE Prophylaxis: Sequential compression device Roll Anis)     Code Status: Level 3 - DNAR and DNI  Advance Directive and Living Will:      Power of :    POLST:      Counseling / Coordination of Care  I spent 30 minutes with the patient

## 2023-03-09 NOTE — PHYSICAL THERAPY NOTE
PHYSICAL THERAPY EVALUATION          Patient Name: General Kam  SMGCV'E Date: 3/9/2023       03/09/23 1024   PT Last Visit   PT Visit Date 03/09/23   Note Type   Note type Evaluation   Pain Assessment   Pain Assessment Tool 0-10   Pain Score No Pain   Patient's Stated Pain Goal No pain   Hospital Pain Intervention(s) Repositioned; Ambulation/increased activity   Restrictions/Precautions   Weight Bearing Precautions Per Order Yes   LUE Weight Bearing Per Order NWB   Braces or Orthoses Sling   Other Precautions Cognitive; Chair Alarm; Bed Alarm;WBS;Fall Risk;Pain   Home Living   Type of [address] Two level;Performs ADLs on one level; Able to live on main level with bedroom/bathroom;Stairs to enter with rails  (3 ELIUD)   Bathroom Shower/Tub Walk-in shower   Bathroom Toilet Standard   Bathroom Equipment   (None per pt report)   [address]   (None per pt report)   Additional Comments Pt reports living alone in a two story house with 3 ELIUD  Pt able to live on first floor  Has local daugher who is willing and able to assist as needed   Prior Function   Level of [place] Independent with ADLs; Independent with functional mobility; Independent with IADLS   Lives With (S)  Alone   Receives Help From Family   IADLs Family/Friend/Other provides transportation; Family/Friend/Other provides meals; Family/Friend/Other provides medication management   Falls in the last 6 months 1 to 4   Vocational Retired   Comments PTA pt amb without use of AD   General   Family/Caregiver Present Yes   Cognition   Attention Attends with cues to redirect   Orientation Level Oriented X4   Memory Decreased short term memory   Following Commands Follows one step commands with increased time or repetition   Comments Pt pleasant and cooperative throughout session   Daughter clarifies home set up information provided during subjective interview   RLE Assessment   RLE Assessment WFL   Strength RLE   RLE Overall Strength 3+/5  (functionally assessed)   LLE Assessment   LLE Assessment WFL   Strength LLE   LLE Overall Strength 3+/5  (functionally assessed)   Bed Mobility   Additional Comments PT found seated in bedside chair at start of eval   Transfers   Sit to Stand 4  Minimal assistance   Additional items Assist x 1; Increased time required;Verbal cues  (close CGA for safety)   Stand to Sit 4  Minimal assistance   Additional items Assist x 1; Increased time required;Verbal cues  (close CGA for safety)   Ambulation/Elevation   Gait pattern Narrow KAMRAN; Excessively slow;Decreased foot clearance;Circumduction; Step through pattern  (Toe out RLE  Sway observed in static stance prior to receiving HHA to RUE)   Gait Assistance 4  Minimal assist   Additional items Assist x 1;Verbal cues   Assistive Device Other (Comment)  (HHA to RUE)   Distance 30'x2   Stair Management Assistance Not tested   Ambulation/Elevation Additional Comments Stairs not appropriate at this time secondary to patient presentation   Balance   Static Sitting Fair   Dynamic Sitting Fair -   Static Standing Poor +   Dynamic Standing Poor   Ambulatory Poor   Endurance Deficit   Endurance Deficit Yes   Activity Tolerance   Activity Tolerance Patient limited by fatigue   Medical Staff [facility],  present for co-evaluation secondary to patient presentation   Nurse Made Aware clearance per RN   Assessment   Prognosis Fair   Problem List Decreased strength;Decreased endurance; Impaired balance;Decreased mobility; Decreased safety awareness;Orthopedic restrictions;Pain   Assessment Pt presented to [facility] s/p mechanical fall at home resulting in L proximal humerus fracture and positive CT scan showing brain mass  Per ortho fracture is being treated with non-operative management and is currently NWB to LUE with sling in place   PMH includes breast CA, cellulitis of LUE, anxiety, appendicitis, GERD  Pt being seen for high complexity PT evaluation due to ongoing medical management for primary diagnosis, continuous pulse oximetry monitoring, utilization of new assistive device, education on weight bearing status, and decreased activity tolerance compared to baseline  Pt reports living alone in a two story house with 3 ELIUD  Pt's daughter is present for subjective interview and is able to clarify pt report  Pt amb without use of AD PTA, and is independent with ADLs, IADLs, and functional mobility  Has supportive daughter local to area and is willing and able to help as needed  Provided pt education on NWB LUE status and performing transfers with adherence to WBS  Pt performs bed mobility with Min Ax1, transfers with Min Ax1, ambulation with Min Ax1 and use of HHA  Stairs not assessed at this time due to patient presentation  Frequent VC to allow LUE to lay into sling as pt shows a guarded presentation during amb, expressing pain throughout  At conclusion of PT evaluation, pt was seated in chair with all needs within reach and chair alarm engaged  Pt presented at PT evaluation with decreased BLE strength, impaired static and dynamic balance which may attribute to risk of falls, decreased endurance, and gait deviations, and will continue to benefit from skilled PT services during hospital stay  Due to the impairments listed above, limited caregiver support, and inaccessible home environment, PT d/c recommendation when medically cleared is post acute rehabilitation services  Barriers to Discharge Inaccessible home environment   Goals   Patient Goals None stated at time of eval   STG Expiration Date 03/19/23   Short Term Goal #1 In 10 days pt will complete: 1) Bed mobility skills with Mod I to increase safety and independence as well as decrease caregiver burden  2) Functional transfers with Mod I to promote increased independence, safety, and QOL   3) Ambulate 200' using least restrictive AD with Mod I without LOB and stable vitals so that pt can negotiate previous living environment safely and promote independence with functional mobility and return to PLOF  4) Stair training up/ down 3+ step/s using rail/s with Mod I so that pt can enter/negotiate previous living environment safely and decrease fall risk  5) Improve balance grades by 1/2 grade to increase safety with all mobility and decrease fall risk  6) Improve BLE strength by 1/2 grade to help increase overall functional mobility and decrease fall risk  Plan   Treatment/Interventions Functional transfer training;LE strengthening/ROM; Elevations; Therapeutic exercise; Endurance training;Patient/family training;Equipment eval/education; Bed mobility;Gait training;Spoke to nursing;OT   PT Frequency 3-5x/wk   Recommendation   PT Discharge Recommendation Post acute rehabilitation services   Equipment Recommended Cane   AM-PAC Basic Mobility Inpatient   Turning in Flat Bed Without Bedrails 3   Lying on Back to Sitting on Edge of Flat Bed Without Bedrails 3   Moving Bed to Chair 3   Standing Up From Chair Using Arms 3   Walk in Room 3   Climb 3-5 Stairs With Railing 1   Basic Mobility Inpatient Raw Score 16   Basic Mobility Standardized Score 38 32   Highest Level Of Mobility   -[facility] Goal 5: Stand one or more mins   -HL Achieved 7: Walk 25 feet or more   Modified [place] Scale   Modified Gus Scale 4   Barthel Index   Feeding 5   Bathing 5   Grooming Score 0   Dressing Score 5   Bladder Score 10   Bowels Score 10   Toilet Use Score 5   Transfers (Bed/Chair) Score 5   Mobility (Level Surface) Score 0   Stairs Score 0   Barthel Index Score 45   End of Consult   Patient Position at End of Consult Bedside chair;Bed/Chair alarm activated; All needs within reach     [facility]  03/09/23

## 2023-03-09 NOTE — CASE MANAGEMENT
Case Management Assessment & Discharge Planning Note    Patient name John Conley  Location [facility] 609/[facility] 766-59 MRN [id number]  : 1935 Date 3/9/2023       Current Admission Date: 3/8/2023  Current Admission Diagnosis:Pathological fracture, left humerus, initial encounter for fracture   Patient Active Problem List    Diagnosis Date Noted   • Acute pain due to trauma 2023   • Fall 2023   • Intraparenchymal hemorrhage of brain ([facility] ) 2023   • Pathological fracture, left humerus, initial encounter for fracture 2023   • Brain mass 2023   • Generalized abdominal pain 2021   • Chronic idiopathic constipation 12/10/2020   • Colitis 2019   • Refusal of statin medication by patient 2019   • Medicare annual wellness visit, subsequent 2018   • Anxiety 2018   • Breast cancer ([facility] ) 2018   • Esophageal reflux 2018   • Hiatal hernia 2018   • Hyperlipidemia 2018   • Post-menopausal osteoporosis 2018   • Refused influenza vaccine 2018      LOS (days): 1  Geometric Mean LOS (GMLOS) (days): 4 60  Days to GMLOS:3 7     OBJECTIVE:    Risk of Unplanned Readmission Score: 9 69         Current admission status: Inpatient       Preferred Pharmacy:   [place] [address]  Phone: [phone] Fax: [phone]    Primary Care Provider: Tristen Lloyd DO    Primary Insurance: [facility]  Secondary Insurance:     ASSESSMENT:  Keven Vargas Proxies    There are no active Health Care Proxies on file         Advance Directives  Does patient have a [address] [facility] [address]?: No  Was patient offered paperwork?: Yes  Does patient currently have a Health Care decision maker?: Yes, please see Health Care Proxy section  Does patient have Advance Directives?: No  Was patient offered paperwork?: Yes  Primary Contact: Janice Albert [phone] Readmission Root Cause  30 Day Readmission: No    Patient Information  Admitted from[de-identified] Home  Mental Status: Alert  During Assessment patient was accompanied by: Daughter  Assessment information provided by[de-identified] Patient, Daughter  Support Systems: Self, Spouse/significant other, Daughter, Family members  [place] of Residence: [facility] do you live in?: [address] entry access options   Select all that apply : Stairs  Number of steps to enter home : 3  Do the steps have railings?: Yes  Type of Current Residence: 2 story home  Upon entering residence, is there a bedroom on the main floor (no further steps)?: No  A bedroom is located on the following floor levels of residence (select all that apply):: 2nd Floor  Upon entering residence, is there a bathroom on the main floor (no further steps)?: No  Indicate which floors of current residence have a bathroom (select all the apply):: 2nd Floor  Number of steps to 2nd floor from main floor: One Flight  In the last 12 months, was there a time when you were not able to pay the mortgage or rent on time?: No  In the last 12 months, how many places have you lived?: 1  In the last 12 months, was there a time when you did not have a steady place to sleep or slept in a shelter (including now)?: No  Homeless/housing insecurity resource given?: N/A  Living Arrangements: Lives Alone  Is patient a ?: No    Activities of Daily Living Prior to Admission  Functional Status: Independent  Completes ADLs independently?: Yes  Ambulates independently?: Yes  Does patient use assisted devices?: No  Does patient currently own DME?: Yes  What DME does the patient currently own?: [employer]  Does patient have a history of Outpatient Therapy (PT/OT)?: No  Does the patient have a history of Short-Term Rehab?: No  Does patient have a history of HHC?: No  Does patient currently have [facility]?: No    Patient Information Continued  Does patient have prescription coverage?: Yes  Within the past 12 months, you worried that your food would run out before you got the money to buy more : Never true  Within the past 12 months, the food you bought just didn't last and you didn't have money to get more : Never true  Food insecurity resource given?: N/A  Does patient receive dialysis treatments?: No  Does patient have a history of substance abuse?: No  Does patient have a history of Mental Health Diagnosis?: No    Means of Transportation  [facility]] Family transport  In the past 12 months, has lack of transportation kept you from medical appointments or from getting medications?: No  In the past 12 months, has lack of transportation kept you from meetings, work, or from getting things needed for daily living?: No  Was application for public transport provided?: N/A    DISCHARGE DETAILS:    Discharge planning discussed with[de-identified] patient and dtr  Freedom of Choice: Yes     CM contacted family/caregiver?: Yes  Were Treatment Team discharge recommendations reviewed with patient/caregiver?: Yes  Did patient/caregiver verbalize understanding of patient care needs?: N/A- going to facility  Were patient/caregiver advised of the risks associated with not following Treatment Team discharge recommendations?: Yes    Contacts  Patient Contacts: Cherylene Corti (Child)   [phone]  Relationship to Patient[de-identified] Family  Contact Method: Phone, In Person  Phone Number: [phone]  Reason/Outcome: Emergency Contact, Continuity of [address]         Is the patient interested in [facility] at discharge?: No    Other Referral/Resources/Interventions Provided:  Interventions: Short Term Rehab, SNF    Treatment Team Recommendation: Short Term Rehab, SNF  Discharge Destination Plan[de-identified] Short Term Rehab, SNF        Cm met with pt and her dtr, to discuss d/c planning  Pt was seen by OT/PT and recommended for IP rehab     Pt is in agreement with this plan and would like referrals to [facility] [place], [address] [facility]  CM placed and will follow up  Pt doesn't have COVID vaccinations    CM reviewed d/c planning process including the following: identifying help at home, patient preference for d/c planning needs, Discharge Lounge, [facility] Meds to Bed program, availability of treatment team to discuss questions or concerns patient and/or family may have regarding understanding medications and recognizing signs and symptoms once discharged  CM also encouraged patient to follow up with all recommended appointments after discharge  Patient advised of importance for patient and family to participate in managing patient’s medical well being

## 2023-03-09 NOTE — UTILIZATION REVIEW
Initial Clinical Review    Admission: Date/Time/Statement:   Admission Orders (From admission, onward)     Ordered        03/08/23 1716  Inpatient Admission  Once                      Orders Placed This Encounter   Procedures   • Inpatient Admission     Standing Status:   Standing     Number of Occurrences:   1     Order Specific Question:   Level of Care     Answer:   Med Surg [16]     Order Specific Question:   Estimated length of stay     Answer:   More than 2 Midnights     Order Specific Question:   Certification     Answer:   I certify that inpatient services are medically necessary for this patient for a duration of greater than two midnights  See H&P and MD Progress Notes for additional information about the patient's course of treatment  ED Arrival Information     Expected   3/8/2023     Arrival   3/8/2023 16:26    Acuity   Urgent            Means of arrival   Ambulance    Escorted by   [facility]    Service   Trauma    Admission type   Emergency            Arrival complaint   trauma           Chief Complaint   Patient presents with   • Fall     Trauma transfer, fell in drive way       Initial Presentation: 82 y o  female was transferred from [address] [place] [address] to [facility] for a higher level of care  Pt initially presented to [facility]'s s/p fall yesterday  Pt reports that initial fall was when she was walking in her driveway  She fell hitting her head and L arm  +LOC  Did not to to the ED, returned to baseline  She was found on the flr of her bedroom early this am, unknown amt of downtime, +LOC, +head strike and was taken to the ED  In the ED, CT head showed 1 4 x 1 0 x 1 2 cm rounded focus of hyperdensity in the left anterior parietal lobe with extensive associated vasogenic edema worrisome for a hemorrhagic mass  Xray showed acute left proximal humerus fracture  She was transferred to [facility] for further evaluation    On arrival to [facility], pt aaox3, GCS 15, sling in place on L arm, decreased ROM on L shoulder  Admitted as Inpatient for brain mass, acute proximal humerus fx: Neurosurg consult, freq neuro checks, Hold AP/AC, MRI, Stat CTH with GCS change >2  Ortho consult  PT/OT  03/0/8 Ortho Consult: Pt w/ syncopal fall x 2 with left proximal humerus fracture: On ex, Skin tear on L shoulder, does not probe to the bone, ecchymosis and swelling noted over the shoulder, TTP over shoulder and upper arm     Plan: Sling placed  Analgesics for pain  NonOp management of left proximal humeral fracture  NWB LUE  Date: 03/09  Day 2:   Ortho Notes: No acute events  Pain controlled while immobilized in sling  NWB LUE in sling  PT/OT as appropriate  DVT ppx  Pain control multimodal  Mon for ABLA  Geriatrics Consult: Pt w/ Hemorrhagic brain mass and left humeral fracture s/p fall  Frailty, ambulatory dysfunction: most likely secondary to advanced age, and multiple co-morbidities  Supportive care, PT/OT, fall precautions  Delirium precautions: non-pharmacologic measures including reorientation, redirection, early mobilization, adequate hydration, sleep hygiene, pain management, and bowel regimen to avoid constipation  Neurosurg Consult: Intraparenchymal Hemorrhage of brain: MRI brain 3/8/2023: Subacute hemorrhage within the left parafalcine frontal lobe with moderate edema and local mass effect  No mass or acute ischemia identified within the adjacent brain parenchyma  Recommend repeat examination as hemorrhage continues to resolve to exclude underlying lesion  Plan: Freq neuro checks  No further cranial imaging required at this time  Recommend STAT CT head for decline in GCS >2 points in 1 hour  Recommend MRI brain in a delayed fashion to evaluate for underlying mass  Brain mass has not been confirmed at this time  Keppra for seizure ppx per trauma team  DVT ppx: SCD's only  Hold all AC/AP medication at this time  PT/OT eval  CT CAP ordered to stage given concern for underlying pathology intracranially  Date: 03/10   Day 3: Has surpassed a 2nd midnight with active treatments and services  Pt feeling well, denies pain  Denies sob, cp, numbness/tingling  PT/OT eval pending  Pain control prn  Cont neuro checks  Keppra  Cleared to satrt Lovenox for DVT ppx  F/u ortho OP  PE: GCS 15, LUE in sling, ecchymosis to proximal arm extending to the axilla; +2 pulses bilaterally upper & lower extremities; no edema    ED Triage Vitals [03/08/23 1645]   Temperature Pulse Respirations Blood Pressure SpO2   98 6 °F (37 °C) 70 16 150/68 98 %      Temp Source Heart Rate Source Patient Position - Orthostatic VS BP Location FiO2 (%)   Oral Monitor Lying -- --      Pain Score       3          Wt Readings from Last 1 Encounters:   03/08/23 43 5 kg (96 lb)     Additional Vital Signs:   Date/Time Temp Pulse Resp BP MAP (mmHg) SpO2 O2 Device Patient Position - Orthostatic VS   03/09/23 07:21:21 98 1 °F (36 7 °C) 67 16 133/90 104 99 % -- --   03/09/23 01:54:19 97 8 °F (36 6 °C) 65 18 134/58 83 99 % -- --   03/08/23 22:30:18 97 7 °F (36 5 °C) 69 21 138/78 98 99 % -- --   03/08/23 19:03:49 98 3 °F (36 8 °C) 80 19 119/60 80 97 % -- --   03/08/23 1700 -- 72 18 147/76 -- 99 % None (Room air) --       Pertinent Labs/Diagnostic Test Results:   CT chest abdomen pelvis w contrast   Final Result by Donald Cardona MD (03/10 1004)      No CT findings which suggest a potential primary for the lesion in the brain  A nonspecific 2 mm right upper lobe lung nodule, can be assessed for stability with follow-up at 6 months      Scattered granulomas   Proximal left humeral fracture      Workstation performed: [id number]         MRI brain w wo contrast   Final Result by Fredi Bermeo DO (03/09 9076)      Subacute hemorrhage within the left parafalcine frontal lobe with moderate surrounding vasogenic edema and localized mass effect  No mass or acute ischemia identified within the adjacent brain parenchyma    Recommend repeat examination as hemorrhage    continues to resolve to exclude occult underlying lesion  Mild chronic microangiopathic change elsewhere within the brain  Workstation performed: [id number]         XR shoulder 1 vw left   Final Result by Milo Ruiz MD (03/09 0810)   Comminuted impacted fracture of the humeral head and neck is seen  IMPRESSION:      No dislocation        Workstation performed: [id number]           03/08 EKG result: NSR    Date and Time Eye Opening Best Verbal Response Best Motor Response Negin Coma Scale Score   03/10/23 0400 4 5 6 15   03/10/23 0000 4 5 6 15   03/09/23 1943 4 5 6 15   03/09/23 1600 4 5 6 15   03/09/23 1200 4 5 6 15   03/09/23 1100 4 5 6 15   03/09/23 1000 4 5 6 15   03/09/23 0900 4 5 6 15   03/09/23 0800 4 5 6 15   03/09/23 0700 4 5 6 15   03/09/23 0500 4 5 6 15   03/09/23 0400 4 5 6 15   03/09/23 0300 4 5 6 15   03/09/23 0200 4 5 6 15   03/09/23 0100 4 5 6 15   03/09/23 0000 4 5 6 15   03/08/23 2300 4 5 6 15   03/08/23 2200 4 5 6 15   03/08/23 2100 4 5 6 15   03/08/23 2000 4 5 6 15   03/08/23 1700 4 5 6 15   03/08/23 1645 4 5 6 15   03/08/23 1500 4 5 6 15   03/08/23 1430 4 5 6 15   03/08/23 1400 4 5 6 15   03/08/23 1345 4 5 6 15   03/08/23 1330 4 5 6 15   03/08/23 1315 4 5 6 15   03/08/23 1300 4 5 6 15   03/08/23 1250 4 5 6 15           Results from last 7 days   Lab Units 03/09/23  0839 03/08/23  1306   WBC Thousand/uL 9 71 9 66   HEMOGLOBIN g/dL 11 9 12 1   HEMATOCRIT % 36 3 35 4   PLATELETS Thousands/uL 212 214   NEUTROS ABS Thousands/µL 7 02 7 56         Results from last 7 days   Lab Units 03/09/23  0839 03/08/23  1306   SODIUM mmol/L 135 134*   POTASSIUM mmol/L 4 3 3 8   CHLORIDE mmol/L 102 98   CO2 mmol/L 28 30   ANION GAP mmol/L 5 6   BUN mg/dL 28* 29*   CREATININE mg/dL 1 11 1 12   EGFR ml/min/1 73sq m 45 44   CALCIUM mg/dL 9 7 9 7     Results from last 7 days   Lab Units 03/08/23  1306   AST U/L 24   ALT U/L 16   ALK PHOS U/L 97   TOTAL PROTEIN g/dL 6 8 ALBUMIN g/dL 4 1   TOTAL BILIRUBIN mg/dL 0 61         Results from last 7 days   Lab Units 03/09/23  0839 03/08/23  1306   GLUCOSE RANDOM mg/dL 94 163*       Results from last 7 days   Lab Units 03/08/23  1306   CK TOTAL U/L 373*   CK MB INDEX % 3 4*   CK MB ng/mL 12 8*     Results from last 7 days   Lab Units 03/08/23  1306   HS TNI 0HR ng/L 8         Results from last 7 days   Lab Units 03/08/23  1306   PROTIME seconds 13 6   INR  1 02   PTT seconds 24             ED Treatment:   Medication Administration from 03/08/2023 1419 to 03/08/2023 1847       Date/Time Order Dose Route Action     03/08/2023 1835 EST docusate sodium (COLACE) capsule 100 mg 100 mg Oral Given        Past Medical History:   Diagnosis Date   • [facility]   • Cellulitis of left upper arm    • History of anxiety    • History of appendicitis    • History of gastroesophageal reflux (GERD)      Present on Admission:  **None**      Admitting Diagnosis: Brain mass [G93 89]  Closed displaced fracture of surgical neck of left humerus, unspecified fracture morphology, initial encounter [S42 212A]  Unspecified multiple injuries, initial encounter [T07  XXXA]  Age/Sex: 82 y o  female  Admission Orders:  SCD  PT/OT  Neuro checks  HOT protocol    Scheduled Medications:  acetaminophen, 975 mg, Oral, Q8H TEENA  bimatoprost, 1 drop, Both Eyes, HS  citalopram, 20 mg, Oral, Daily  docusate sodium, 100 mg, Oral, BID  enoxaparin, 30 mg, Subcutaneous, Q24H TEENA  levETIRAcetam, 500 mg, Oral, Q12H TEENA  lidocaine, 1 patch, Topical, Daily  saccharomyces boulardii, 250 mg, Oral, BID  senna, 2 tablet, Oral, HS      Continuous IV Infusions: none     PRN Meds:  naloxone, 0 04 mg, Intravenous, Q1MIN PRN  ondansetron, 4 mg, Intravenous, Q6H PRN  oxyCODONE, 2 5 mg, Oral, Q4H PRN  oxyCODONE, 5 mg, Oral, Q4H PRN 03/09 x 1        IP CONSULT TO ORTHOPEDIC SURGERY  IP CONSULT TO NEUROSURGERY  [facility]    Network Utilization Review Department  ATTENTION: Please call with any questions or concerns to [phone] and carefully listen to the prompts so that you are directed to the right person  All voicemails are confidential   Osvaldo Marquez all requests for admission clinical reviews, approved or denied determinations and any other requests to dedicated fax number below belonging to the campus where the patient is receiving treatment   List of dedicated fax numbers for the Facilities:  [address] DENIALS (Administrative/Medical Necessity) [phone]   [address] (Maternity/NICU/Pediatrics) [phone]   91 Adrianna Young [phone]   [facility] [phone]   [address] [place] [address] [facility] [phone]   1557 [facility] 134 815 [facility] [phone]

## 2023-03-09 NOTE — PLAN OF CARE
Problem: Potential for Falls  Goal: Patient will remain free of falls  Description: INTERVENTIONS:  - Educate patient/family on patient safety including physical limitations  - Instruct patient to call for assistance with activity   - Consult OT/PT to assist with strengthening/mobility   - Keep Call bell within reach  - Keep bed low and locked with side rails adjusted as appropriate  - Keep care items and personal belongings within reach  - Initiate and maintain comfort rounds  - Make Fall Risk Sign visible to staff  - Offer Toileting every  Hours, in advance of need  - Initiate/Maintain alarm  - Obtain necessary fall risk management equipment:   - Apply yellow socks and bracelet for high fall risk patients  - Consider moving patient to room near nurses station  Outcome: Progressing     Problem: Nutrition/Hydration-ADULT  Goal: Nutrient/Hydration intake appropriate for improving, restoring or maintaining nutritional needs  Description: Monitor and assess patient's nutrition/hydration status for malnutrition  Collaborate with interdisciplinary team and initiate plan and interventions as ordered  Monitor patient's weight and dietary intake as ordered or per policy  Utilize nutrition screening tool and intervene as necessary  Determine patient's food preferences and provide high-protein, high-caloric foods as appropriate       INTERVENTIONS:  - Monitor oral intake, urinary output, labs, and treatment plans  - Assess nutrition and hydration status and recommend course of action  - Evaluate amount of meals eaten  - Assist patient with eating if necessary   - Allow adequate time for meals  - Recommend/ encourage appropriate diets, oral nutritional supplements, and vitamin/mineral supplements  - Order, calculate, and assess calorie counts as needed  - Recommend, monitor, and adjust tube feedings and TPN/PPN based on assessed needs  - Assess need for intravenous fluids  - Provide specific nutrition/hydration education as appropriate  - Include patient/family/caregiver in decisions related to nutrition  Outcome: Progressing     Problem: MOBILITY - ADULT  Goal: Maintain or return to baseline ADL function  Description: INTERVENTIONS:  -  Assess patient's ability to carry out ADLs; assess patient's baseline for ADL function and identify physical deficits which impact ability to perform ADLs (bathing, care of mouth/teeth, toileting, grooming, dressing, etc )  - Assess/evaluate cause of self-care deficits   - Assess range of motion  - Assess patient's mobility; develop plan if impaired  - Assess patient's need for assistive devices and provide as appropriate  - Encourage maximum independence but intervene and supervise when necessary  - Involve family in performance of ADLs  - Assess for home care needs following discharge   - Consider OT consult to assist with ADL evaluation and planning for discharge  - Provide patient education as appropriate  Outcome: Progressing  Goal: Maintains/Returns to pre admission functional level  Description: INTERVENTIONS:  - Perform BMAT or MOVE assessment daily    - Set and communicate daily mobility goal to care team and patient/family/caregiver  - Collaborate with rehabilitation services on mobility goals if consulted  - Perform Range of Motion  times a day  - Reposition patient every  hours    - Dangle patient  times a day  - Stand patient  times a day  - Ambulate patie times a day  - Out of bed to chair  times a day   - Out of bed for meals  times a day  - Out of bed for toileting  - Record patient progress and toleration of activity level   Outcome: Progressing     Problem: SAFETY ADULT  Goal: Patient will remain free of falls  Description: INTERVENTIONS:  - Educate patient/family on patient safety including physical limitations  - Instruct patient to call for assistance with activity   - Consult OT/PT to assist with strengthening/mobility   - Keep Call bell within reach  - Keep bed low and locked with side rails adjusted as appropriate  - Keep care items and personal belongings within reach  - Initiate and maintain comfort rounds  - Make Fall Risk Sign visible to staff  - Offer Toileting every  Hours, in advance of need  - Initiate/Maintain alarm  - Obtain necessary fall risk management equipment:   - Apply yellow socks and bracelet for high fall risk patients  - Consider moving patient to room near nurses station  Outcome: Progressing  Goal: Maintain or return to baseline ADL function  Description: INTERVENTIONS:  -  Assess patient's ability to carry out ADLs; assess patient's baseline for ADL function and identify physical deficits which impact ability to perform ADLs (bathing, care of mouth/teeth, toileting, grooming, dressing, etc )  - Assess/evaluate cause of self-care deficits   - Assess range of motion  - Assess patient's mobility; develop plan if impaired  - Assess patient's need for assistive devices and provide as appropriate  - Encourage maximum independence but intervene and supervise when necessary  - Involve family in performance of ADLs  - Assess for home care needs following discharge   - Consider OT consult to assist with ADL evaluation and planning for discharge  - Provide patient education as appropriate  Outcome: Progressing  Goal: Maintains/Returns to pre admission functional level  Description: INTERVENTIONS:  - Perform BMAT or MOVE assessment daily    - Set and communicate daily mobility goal to care team and patient/family/caregiver  - Collaborate with rehabilitation services on mobility goals if consulted  - Perform Range of Motion  times a day  - Reposition patient every  hours    - Dangle patient  times a day  - Stand patient  times a day  - Ambulate patient  times a day  - Out of bed to chair  times a day   - Out of bed for meals  times a day  - Out of bed for toileting  - Record patient progress and toleration of activity level   Outcome: Progressing

## 2023-03-09 NOTE — OCCUPATIONAL THERAPY NOTE
Occupational Therapy Evaluation     Patient Name: Lily Lanza  LDZVS'X Date: 3/9/2023  Problem List  Principal Problem:    Pathological fracture, left humerus, initial encounter for fracture  Active Problems:    Brain mass    Past Medical History  Past Medical History:   Diagnosis Date    Breast cancer ([facility] [address] ) 2007    Cellulitis of left upper arm     History of anxiety     History of appendicitis     History of gastroesophageal reflux (GERD)      Past Surgical History  Past Surgical History:   Procedure Laterality Date    APPENDECTOMY      Date Unknown    CATARACT EXTRACTION Bilateral     Left 2008 // Right 2008     SECTION      Date Unknown    HYSTERECTOMY      MASTECTOMY Right 2007    PARTIAL HYSTERECTOMY      fallopian tube removed, ? side    TUBAL LIGATION      Date Unknown         23 1120   OT Last Visit   OT Visit Date 23   Note Type   Note type Evaluation   Pain Assessment   Pain Assessment Tool 0-10   Pain Score 8   Pain Location/Orientation Orientation: Left; Location: [facility]   Hospital Pain Intervention(s) Repositioned; Ambulation/increased activity; Emotional support;Relaxation technique   Restrictions/Precautions   Weight Bearing Precautions Per Order Yes   RUE Weight Bearing Per Order WBAT   LUE Weight Bearing Per Order (S)  NWB   RLE Weight Bearing Per Order WBAT   LLE Weight Bearing Per Order WBAT   Braces or Orthoses Sling   Other Precautions Cognitive; Chair Alarm; Bed Alarm;WBS;Fall Risk;Pain   Home Living   Type of [address] [place] [address] Two level; Able to live on main level with bedroom/bathroom;Stairs to enter with rails  (3 ELIUD)   Bathroom Shower/Tub Walk-in shower   Bathroom Toilet Standard   Prior Function   Level of [place] Independent with ADLs; Independent with functional mobility; Needs assistance with IADLS   Lives With (S)  Alone   Receives Help From Family   IADLs Family/Friend/Other provides transportation   Falls in the last 6 months 1 to 4 Vocational Retired   Lifestyle   Autonomy I adls and mobility w/o ad - denies additional falls - family assists with iadls PRN   Reciprocal Relationships supportive family who are able to provide assist prn   Service to Others retired   Intrinsic Gratification mostly sedentary   General   Family/Caregiver Present Yes   Subjective   Subjective offers no c/o   ADL   Eating Assistance 5  Supervision/Setup   Grooming Assistance 4  Minimal Assistance   [address] 3  Moderate Assistance   LB [address] 3  Moderate Assistance   [address] 3  Moderate Assistance   LB Dressing Assistance 3  Moderate [address]  3  Moderate Assistance   Bed Mobility   Additional [facility] in chair   Transfers   Sit to Stand 4  Minimal assistance   Stand to Sit 4  Minimal assistance   Functional Mobility   Functional Mobility 4  Minimal assistance   Additional items Hand hold assistance   Balance   Static Sitting Fair   Dynamic Sitting Fair -   Static Standing Poor +   Dynamic Standing Poor   Ambulatory Poor   Activity Tolerance   Activity Tolerance Patient limited by fatigue;Patient limited by pain;Treatment limited secondary to medical complications (Comment)   RUE Assessment   RUE Assessment WFL   LUE Assessment   LUE Assessment X  (in sling)   Cognition   Arousal/Participation Arousable; Cooperative   Attention Attends with cues to redirect   Orientation Level Oriented to person;Oriented to time;Oriented to situation;Oriented to place   Memory Decreased short term memory;Decreased recall of recent events;Decreased recall of precautions   Following Commands Follows one step commands with increased time or repetition   Assessment   Limitation Decreased ADL status; Decreased UE ROM; Decreased Safe judgement during ADL;Decreased cognition;Decreased endurance;Decreased self-care trans;Decreased high-level ADLs; Non-func L UE   Prognosis Good;Fair   Assessment Pt is a 82 y o  female who was admitted to [facility] on 3/8/2023 with Pathological fracture, left humerus, initial encounter for fracture 2* fall - found to have brain mass   Pt's problem list also includes PMH of previous surgery, cancer history and breast C, anxiety, appendicitis, GERD  At baseline pt was completing adls and mobility independently w/o ad - family assists with iadls prn  Pt lives alone in 2 story home with 3 ELIUD and FFSU PRN  Currently pt requires moderate assist for overall ADLS and min assist for functional mobility/transfers  Pt currently presents with impairments in the following categories -steps to enter environment, limited home support, difficulty performing ADLS, difficulty performing IADLS , limited insight into deficits, health management  and environment activity tolerance, endurance, standing balance/tolerance, UE ROM, memory, insight, safety , judgement , attention  and sequencing   These impairments, as well as pt's fatigue, pain, orthopedic restricitions , WBS , decreased caregiver support, risk for falls and home environment  limit pt's ability to safely engage in all baseline areas of occupation, includinggrooming, bathing, dressing, toileting, functional mobility/transfers, community mobility, laundry , house maintenance, medication management, meal prep, cleaning, social participation  and leisure activities  From OT standpoint, recommend inpt rehab upon D/C  OT will continue to follow to address the below stated goals  Goals   Patient Goals none stated at this time   LTG Time Frame 10-14   Long Term Goal #1 refer to established goals below   Plan   Treatment Interventions ADL retraining;Functional transfer training;UE strengthening/ROM; Patient/family training;Equipment evaluation/education; Compensatory technique education; Activityengagement   Goal Expiration Date 03/23/23   OT Frequency 2-3x/wk   Recommendation   OT Discharge Recommendation Post acute rehabilitation services   AM-PAC Daily Activity Inpatient   Lower Body Dressing 2   Bathing 2   Toileting 2   Upper Body Dressing 2   Grooming 3   Eating 4   Daily Activity Raw Score 15   Daily Activity Standardized Score (Calc for Raw Score >=11) 34 69   AM-PAC Applied Cognition Inpatient   Following a Speech/Presentation 3   Understanding Ordinary Conversation 4   Taking Medications 3   Remembering Where Things Are Placed or Put Away 3   Remembering List of 4-5 Errands 2   Taking Care of Complicated Tasks 2   Applied Cognition Raw Score 17   Applied Cognition Standardized Score 36 52   End of Consult   Education Provided Yes;Family or social support of family present for education by provider   Patient Position at End of Consult Bedside chair;Bed/Chair alarm activated; All needs within reach   Nurse Communication Nurse aware of consult       OCCUPATIONAL THERAPY GOALS:    *Min a adls after setup with use of AE and 1 handed techniques PRN  *SBA toileting and clothing management   *SBA functional mobility and transfers to/from all surfaces with fair to fair+ dynamic balance and safety for participation in dynamic adls and iadl tasks   *Demonstrate fair+ carryover with NWB LUE, sling management and use of 1 handed techniques during functional tasks  *Assess DME needs   *Increase activity tolerance to 35-40 minutes for participation in adls and enjoyable activities  *Pt to participate in ongoing functional cognitive assessment with good attention/participation to assist with safe d/c recommendations      The patient's raw score on the AM-PAC Daily Activity Inpatient Short Form is 15  A raw score of less than 19 suggests the patient may benefit from discharge to post-acute rehabilitation services  Please refer to the recommendation of the Occupational Therapist for safe discharge planning          [facility]

## 2023-03-09 NOTE — PLAN OF CARE
Problem: PHYSICAL THERAPY ADULT  Goal: Performs mobility at highest level of function for planned discharge setting  See evaluation for individualized goals  Description: Treatment/Interventions: Functional transfer training, LE strengthening/ROM, Elevations, Therapeutic exercise, Endurance training, Patient/family training, Equipment eval/education, Bed mobility, Gait training, Spoke to nursing, OT  Equipment Recommended: Kojo Smith       See flowsheet documentation for full assessment, interventions and recommendations  Note: Prognosis: Fair  Problem List: Decreased strength, Decreased endurance, Impaired balance, Decreased mobility, Decreased safety awareness, Orthopedic restrictions, Pain  Assessment: Pt presented to [facility] s/p mechanical fall at home resulting in L proximal humerus fracture and positive CT scan showing brain mass  Per ortho fracture is being treated with non-operative management and is currently NWB to LUE with sling in place  PMH includes breast CA, cellulitis of LUE, anxiety, appendicitis, GERD  Pt being seen for high complexity PT evaluation due to ongoing medical management for primary diagnosis, continuous pulse oximetry monitoring, utilization of new assistive device, education on weight bearing status, and decreased activity tolerance compared to baseline  Pt reports living alone in a two story house with 3 ELIUD  Pt's daughter is present for subjective interview and is able to clarify pt report  Pt amb without use of AD PTA, and is independent with ADLs, IADLs, and functional mobility  Has supportive daughter local to area and is willing and able to help as needed  Provided pt education on NWB LUE status and performing transfers with adherence to WBS  Pt performs bed mobility with Min Ax1, transfers with Min Ax1, ambulation with Min Ax1 and use of HHA  Stairs not assessed at this time due to patient presentation   Frequent VC to allow LUE to lay into sling as pt shows a guarded presentation during amb, expressing pain throughout  At conclusion of PT evaluation, pt was seated in chair with all needs within reach and chair alarm engaged  Pt presented at PT evaluation with decreased BLE strength, impaired static and dynamic balance which may attribute to risk of falls, decreased endurance, and gait deviations, and will continue to benefit from skilled PT services during hospital stay  Due to the impairments listed above, limited caregiver support, and inaccessible home environment, PT d/c recommendation when medically cleared is post acute rehabilitation services  Barriers to Discharge: Inaccessible home environment     PT Discharge Recommendation: Post acute rehabilitation services    See flowsheet documentation for full assessment

## 2023-03-10 ENCOUNTER — TELEPHONE (OUTPATIENT)
Dept: NEUROSURGERY | Facility: CLINIC | Age: 88
End: 2023-03-10

## 2023-03-10 LAB
ATRIAL RATE: 72 BPM
P AXIS: 62 DEGREES
PR INTERVAL: 144 MS
QRS AXIS: 67 DEGREES
QRSD INTERVAL: 94 MS
QT INTERVAL: 414 MS
QTC INTERVAL: 453 MS
T WAVE AXIS: 39 DEGREES
VENTRICULAR RATE: 72 BPM

## 2023-03-10 RX ADMIN — OXYCODONE HYDROCHLORIDE 5 MG: 5 TABLET ORAL at 18:01

## 2023-03-10 RX ADMIN — DOCUSATE SODIUM 100 MG: 100 CAPSULE, LIQUID FILLED ORAL at 10:12

## 2023-03-10 RX ADMIN — SENNOSIDES 17.2 MG: 8.6 TABLET, FILM COATED ORAL at 21:31

## 2023-03-10 RX ADMIN — ACETAMINOPHEN 975 MG: 325 TABLET ORAL at 05:54

## 2023-03-10 RX ADMIN — Medication 250 MG: at 10:12

## 2023-03-10 RX ADMIN — DOCUSATE SODIUM 100 MG: 100 CAPSULE, LIQUID FILLED ORAL at 18:01

## 2023-03-10 RX ADMIN — LIDOCAINE 5% 1 PATCH: 700 PATCH TOPICAL at 10:12

## 2023-03-10 RX ADMIN — ACETAMINOPHEN 975 MG: 325 TABLET ORAL at 21:31

## 2023-03-10 RX ADMIN — ENOXAPARIN SODIUM 30 MG: 30 INJECTION SUBCUTANEOUS at 10:12

## 2023-03-10 RX ADMIN — LEVETIRACETAM 500 MG: 500 TABLET, FILM COATED ORAL at 10:12

## 2023-03-10 RX ADMIN — ACETAMINOPHEN 975 MG: 325 TABLET ORAL at 13:53

## 2023-03-10 RX ADMIN — LEVETIRACETAM 500 MG: 500 TABLET, FILM COATED ORAL at 21:31

## 2023-03-10 RX ADMIN — Medication 250 MG: at 18:01

## 2023-03-10 RX ADMIN — CITALOPRAM HYDROBROMIDE 20 MG: 20 TABLET ORAL at 10:12

## 2023-03-10 RX ADMIN — BIMATOPROST 1 DROP: 0.1 SOLUTION/ DROPS OPHTHALMIC at 21:31

## 2023-03-10 NOTE — PLAN OF CARE
Problem: Potential for Falls  Goal: Patient will remain free of falls  Description: INTERVENTIONS:  - Educate patient/family on patient safety including physical limitations  - Instruct patient to call for assistance with activity   - Consult OT/PT to assist with strengthening/mobility   - Keep Call bell within reach  - Keep bed low and locked with side rails adjusted as appropriate  - Keep care items and personal belongings within reach  - Initiate and maintain comfort rounds  - Make Fall Risk Sign visible to staff  - Apply yellow socks and bracelet for high fall risk patients  - Consider moving patient to room near nurses station  3/10/2023 1409 by Shade Sherman RN  Outcome: Progressing  3/10/2023 1400 by Shade Sherman, RN  Outcome: Progressing     Problem: Nutrition/Hydration-ADULT  Goal: Nutrient/Hydration intake appropriate for improving, restoring or maintaining nutritional needs  Description: Monitor and assess patient's nutrition/hydration status for malnutrition  Collaborate with interdisciplinary team and initiate plan and interventions as ordered  Monitor patient's weight and dietary intake as ordered or per policy  Utilize nutrition screening tool and intervene as necessary  Determine patient's food preferences and provide high-protein, high-caloric foods as appropriate       INTERVENTIONS:  - Monitor oral intake, urinary output, labs, and treatment plans  - Assess nutrition and hydration status and recommend course of action  - Evaluate amount of meals eaten  - Assist patient with eating if necessary   - Allow adequate time for meals  - Recommend/ encourage appropriate diets, oral nutritional supplements, and vitamin/mineral supplements  - Order, calculate, and assess calorie counts as needed  - Recommend, monitor, and adjust tube feedings and TPN/PPN based on assessed needs  - Assess need for intravenous fluids  - Provide specific nutrition/hydration education as appropriate  - Include patient/family/caregiver in decisions related to nutrition  3/10/2023 1409 by Hansel Tesfaye RN  Outcome: Progressing  3/10/2023 1400 by Hansel Tesfaye RN  Outcome: Progressing     Problem: MOBILITY - ADULT  Goal: Maintains/Returns to pre admission functional level  Description: INTERVENTIONS:  - Perform BMAT or MOVE assessment daily    - Set and communicate daily mobility goal to care team and patient/family/caregiver     - Collaborate with rehabilitation services on mobility goals if consulted  - Out of bed for toileting  - Record patient progress and toleration of activity level   3/10/2023 1409 by Hansel Tesfaye RN  Outcome: Progressing  3/10/2023 1400 by Hansel Tesfaye RN  Outcome: Progressing     Problem: SAFETY ADULT  Goal: Patient will remain free of falls  Description: INTERVENTIONS:  - Educate patient/family on patient safety including physical limitations  - Instruct patient to call for assistance with activity   - Consult OT/PT to assist with strengthening/mobility   - Keep Call bell within reach  - Keep bed low and locked with side rails adjusted as appropriate  - Keep care items and personal belongings within reach  - Apply yellow socks and bracelet for high fall risk patients  - Consider moving patient to room near nurses station  3/10/2023 1409 by Hansel Tesfaye RN  Outcome: Progressing  3/10/2023 1400 by Hansel Tesfaye RN  Outcome: Progressing

## 2023-03-10 NOTE — PROGRESS NOTES
Progress Note - Levy Still 82 y o  female MRN: [id number]    Unit/Bed#: [facility] 102-10 Encounter: [id number]      Assessment/Plan:  Ambulatory dysfunction  At risk for falls secondary to age, hx of fall, gait instability, visual impairment  Fall precautions  PT/OT  Increased physical exercise, recommend balance and strengthening exercises  Rehab post hospitalization     Acute pain due to trauma  Geriatric pain protocol  Scheduled acetaminophen 975 mg po Q8  Oxycodone 2 5 mg po Q 4 prn moderate pain  Oxycodone 5 mg po Q 4 prn severe pain   Monitor for constipation  Lidoderm patch      Frailty  Mild/moderately frail  Lives alone, has supportive daughter  Albumin 4 1, maintain protien in diet  Weight loss in past couple months  PT/OT  Likely will need increase home support     Cognitive screening  No reported prior memory issues  Recommend check TSH and vitamin B12  CT head (3/8/23) moderate microangiopathic changes  Keep physically, mentally and socially active     Delirium precautions  Baseline: alert and oriented x 3  Provide frequent redirection, reorientation, distraction techniques  Avoid deliriogenic medications such as tramadol, benzodiazepines, anticholinergics,  Benadryl  Treat pain, See geriatric pain protocol  Monitor for constipation and urinary retention  Encourage early and frequent moblization, OOB  Encourage Hydration/ Nutrition  Implement sleep hygiene, limit night time interuptions, group activities     Insomnia  Related to hospitalization  First line is behavioral therapy  Avoid sedative hypnotics such as benzodiazepines and benadryl  Encourage staying awake during the day  Encourage daytime activity, morning exercise  Decrease or eliminate day time naps  Avoid caffeine especially during late afternoon and evening hours  Establish a night time routine     Left proximal humerus fracture  S/p fall  Ortho on consult  SL[facility]  Geriatric pain protocol  PT/OT     Brain mass  Seen on CT head  Neurosurgery on consult  MRI pending  keppra started for seizure ppx    Subjective:   Upon exam pt is lying in bed  She is alert and oriented x 4  Objective:     Vitals: Blood pressure 118/56, pulse 71, temperature 98 °F (36 7 °C), resp  rate 16, weight 43 5 kg (96 lb), SpO2 98 %  ,Body mass index is 20 77 kg/m²  Intake/Output Summary (Last 24 hours) at 3/10/2023 1654  Last data filed at 3/10/2023 0900  Gross per 24 hour   Intake 225 ml   Output --   Net 225 ml       Physical Exam:   General : NAD  HEENT : MMM  Heart : Normal rate, no murmur rub or gallop  Lungs : CTA no wheezes, rales or rhonchi  Abdomen : Soft, NT/ND, BS auscultated in all 4 quads  Ext :  no edema  Skin : Pink, warm, dry, age appropriate turgor and mobility  Neuro : Nonfocal  Psych : Alert and O x 3      Invasive Devices     Peripheral Intravenous Line  Duration           Peripheral IV 03/08/23 Right Wrist 1 day                Lab, Imaging and other studies: I have personally reviewed pertinent reports     and I have personally reviewed pertinent films in PACS  VTE Pharmacologic Prophylaxis: Sequential compression device (Venodyne)   VTE Mechanical Prophylaxis: sequential compression device

## 2023-03-10 NOTE — RESTORATIVE TECHNICIAN NOTE
Restorative Technician Note      Patient Name: Adrienne Perez     Restorative Tech Visit Date: 03/10/23  Note Type: Mobility  Patient Position Upon Consult: Bedside chair  Activity Performed: Ambulated  Assistive Device: Other (Comment) ([facility])  Patient Position at End of Consult: Bedside chair;  All needs within reach    Dennie Santee, Restorative Technician

## 2023-03-10 NOTE — ASSESSMENT & PLAN NOTE
- CT head on 3/9 showed: 1 4 x 1 0 x 1 2 cm rounded focus of hyperdensity in the left anterior parietal lobe with extensive associated vasogenic edema worrisome for a hemorrhagic mass  MR brain without and with contrast is advised for further evaluation   - GCS 15, non-focal  - Neurosurgery consulted recs appreciated   - MRI brain reveals SAH, unable to rule out underlying mass  - neurosurgery recommends obtaining CT C/A/P and f/u MRI brain in 6 weeks to evaluate for underlying mass lesion  This was discussed with patient and family    - Garrett Dawson for 7 days for seizure prophylaxis  - Cleared to start lovenox for DVT ppx per neurosurgery  - Neuro checks to q4h  - Tylenol prn headache  - F/u with neurosurgery in 6 weeks  Will f/u CT C/A/P

## 2023-03-10 NOTE — PLAN OF CARE
Problem: Potential for Falls  Goal: Patient will remain free of falls  Description: INTERVENTIONS:  - Educate patient/family on patient safety including physical limitations  - Instruct patient to call for assistance with activity   - Consult OT/PT to assist with strengthening/mobility   - Keep Call bell within reach  - Keep bed low and locked with side rails adjusted as appropriate  - Keep care items and personal belongings within reach  - Initiate and maintain comfort rounds  - Make Fall Risk Sign visible to staff  - Apply yellow socks and bracelet for high fall risk patients  - Consider moving patient to room near nurses station  Outcome: Progressing     Problem: SAFETY ADULT  Goal: Patient will remain free of falls  Description: INTERVENTIONS:  - Educate patient/family on patient safety including physical limitations  - Instruct patient to call for assistance with activity   - Consult OT/PT to assist with strengthening/mobility   - Keep Call bell within reach  - Keep bed low and locked with side rails adjusted as appropriate  - Keep care items and personal belongings within reach  - Initiate and maintain comfort rounds  - Make Fall Risk Sign visible to staff    Problem: SAFETY ADULT  Goal: Maintains/Returns to pre admission functional level  Description: INTERVENTIONS:  - Perform BMAT or MOVE assessment daily    - Set and communicate daily mobility goal to care team and patient/family/caregiver     - Collaborate with rehabilitation services on mobility goals if consulted    - Out of bed for toileting  - Record patient progress and toleration of activity level   Outcome: Progressing     - Apply yellow socks and bracelet for high fall risk patients  - Consider moving patient to room near nurses station  Outcome: Progressing

## 2023-03-10 NOTE — PLAN OF CARE
Problem: Potential for Falls  Goal: Patient will remain free of falls  Description: INTERVENTIONS:  - Educate patient/family on patient safety including physical limitations  - Instruct patient to call for assistance with activity   - Consult OT/PT to assist with strengthening/mobility   - Keep Call bell within reach  - Keep bed low and locked with side rails adjusted as appropriate  - Keep care items and personal belongings within reach  - Initiate and maintain comfort rounds  - Make Fall Risk Sign visible to staff  - Offer Toileting every 2 Hours, in advance of need  - Initiate/Maintain bed alarm  - Obtain necessary fall risk management equipment:   - Apply yellow socks and bracelet for high fall risk patients  - Consider moving patient to room near nurses station  Outcome: Progressing     Problem: Nutrition/Hydration-ADULT  Goal: Nutrient/Hydration intake appropriate for improving, restoring or maintaining nutritional needs  Description: Monitor and assess patient's nutrition/hydration status for malnutrition  Collaborate with interdisciplinary team and initiate plan and interventions as ordered  Monitor patient's weight and dietary intake as ordered or per policy  Utilize nutrition screening tool and intervene as necessary  Determine patient's food preferences and provide high-protein, high-caloric foods as appropriate       INTERVENTIONS:  - Monitor oral intake, urinary output, labs, and treatment plans  - Assess nutrition and hydration status and recommend course of action  - Evaluate amount of meals eaten  - Assist patient with eating if necessary   - Allow adequate time for meals  - Recommend/ encourage appropriate diets, oral nutritional supplements, and vitamin/mineral supplements  - Order, calculate, and assess calorie counts as needed  - Recommend, monitor, and adjust tube feedings and TPN/PPN based on assessed needs  - Assess need for intravenous fluids  - Provide specific nutrition/hydration education as appropriate  - Include patient/family/caregiver in decisions related to nutrition  Outcome: Progressing     Problem: MOBILITY - ADULT  Goal: Maintain or return to baseline ADL function  Description: INTERVENTIONS:  -  Assess patient's ability to carry out ADLs; assess patient's baseline for ADL function and identify physical deficits which impact ability to perform ADLs (bathing, care of mouth/teeth, toileting, grooming, dressing, etc )  - Assess/evaluate cause of self-care deficits   - Assess range of motion  - Assess patient's mobility; develop plan if impaired  - Assess patient's need for assistive devices and provide as appropriate  - Encourage maximum independence but intervene and supervise when necessary  - Involve family in performance of ADLs  - Assess for home care needs following discharge   - Consider OT consult to assist with ADL evaluation and planning for discharge  - Provide patient education as appropriate  Outcome: Progressing  Goal: Maintains/Returns to pre admission functional level  Description: INTERVENTIONS:  - Perform BMAT or MOVE assessment daily    - Set and communicate daily mobility goal to care team and patient/family/caregiver  - Collaborate with rehabilitation services on mobility goals if consulted  - Perform Range of Motion 3 times a day  - Reposition patient every 2 hours    - Dangle patient 3 times a day  - Stand patient 3 times a day  - Ambulate patient 3 times a day  - Out of bed to chair 3 times a day   - Out of bed for meals 3 times a day  - Out of bed for toileting  - Record patient progress and toleration of activity level   Outcome: Progressing     Problem: SAFETY ADULT  Goal: Patient will remain free of falls  Description: INTERVENTIONS:  - Educate patient/family on patient safety including physical limitations  - Instruct patient to call for assistance with activity   - Consult OT/PT to assist with strengthening/mobility   - Keep Call bell within reach  - Keep bed low and locked with side rails adjusted as appropriate  - Keep care items and personal belongings within reach  - Initiate and maintain comfort rounds  - Make Fall Risk Sign visible to staff  - Offer Toileting every 2 Hours, in advance of need  - Initiate/Maintain bed alarm  - Obtain necessary fall risk management equipment:   - Apply yellow socks and bracelet for high fall risk patients  - Consider moving patient to room near nurses station  Outcome: Progressing  Goal: Maintain or return to baseline ADL function  Description: INTERVENTIONS:  -  Assess patient's ability to carry out ADLs; assess patient's baseline for ADL function and identify physical deficits which impact ability to perform ADLs (bathing, care of mouth/teeth, toileting, grooming, dressing, etc )  - Assess/evaluate cause of self-care deficits   - Assess range of motion  - Assess patient's mobility; develop plan if impaired  - Assess patient's need for assistive devices and provide as appropriate  - Encourage maximum independence but intervene and supervise when necessary  - Involve family in performance of ADLs  - Assess for home care needs following discharge   - Consider OT consult to assist with ADL evaluation and planning for discharge  - Provide patient education as appropriate  Outcome: Progressing  Goal: Maintains/Returns to pre admission functional level  Description: INTERVENTIONS:  - Perform BMAT or MOVE assessment daily    - Set and communicate daily mobility goal to care team and patient/family/caregiver  - Collaborate with rehabilitation services on mobility goals if consulted  - Perform Range of Motion 3 times a day  - Reposition patient every 2 hours    - Dangle patient 3 times a day  - Stand patient 3 times a day  - Ambulate patient 3 times a day  - Out of bed to chair 3 times a day   - Out of bed for meals 3 times a day  - Out of bed for toileting  - Record patient progress and toleration of activity level   Outcome: Progressing

## 2023-03-10 NOTE — CASE MANAGEMENT
Case Management Discharge Planning Note    Patient name John Conley  [facility] MRN [id number]  : 1935 Date 3/10/2023       Current Admission Date: 3/8/2023  Current Admission Diagnosis:Pathological fracture, left humerus, initial encounter for fracture   Patient Active Problem List    Diagnosis Date Noted   • Acute pain due to trauma 2023   • Fall 2023   • Intraparenchymal hemorrhage of brain ([facility] [address] ) 2023   • Pathological fracture, left humerus, initial encounter for fracture 2023   • Generalized abdominal pain 2021   • Chronic idiopathic constipation 12/10/2020   • Colitis 2019   • Refusal of statin medication by patient 2019   • Medicare annual wellness visit, subsequent 2018   • Anxiety 2018   • Breast cancer ([facility] [address] ) 2018   • Esophageal reflux 2018   • Hiatal hernia 2018   • Hyperlipidemia 2018   • Post-menopausal osteoporosis 2018   • Refused influenza vaccine 2018      LOS (days): 2  Geometric Mean LOS (GMLOS) (days): 4 60  Days to GMLOS:2 7     OBJECTIVE:  Risk of Unplanned Readmission Score: 13 49         Current admission status: Inpatient   Preferred Pharmacy:   [place] [address]  Phone: [phone] Fax: [phone]    Primary Care Provider: Tristen Lloyd DO    Primary Insurance: [facility]  Secondary Insurance:     DISCHARGE DETAILS:    CM met with pt and her dtr  CM informed her that [address] [place] [address] can accept for SNF  Awaiting decision from [facility]  Pt interested in SNF but unsure which location  Pt's dtr also asked about a home d/c with [facility] which has clouded pt's decision  CM will follow later to get a determination

## 2023-03-10 NOTE — ASSESSMENT & PLAN NOTE
- Xray showed acute proximal humerus fracture   - Patient currently in sling and NWB  - Appreciate orthopedics eval and recommendations: non-operative management recommended  - NV in tact  - PT/OT  - Concerning for osteoporosis  - f/u with orthopedics in 4 weeks

## 2023-03-10 NOTE — PROGRESS NOTES
[facility]  Progress Note - Letitia Maloney 1935, 82 y o  female MRN: [id number]  [facility] Encounter: [id number]  Primary Care Provider: Maryellen Cheng DO   Date and time admitted to hospital: 3/8/2023  4:39 PM    Intraparenchymal hemorrhage of brain [facility])  Assessment & Plan  - CT head on 3/9 showed: 1 4 x 1 0 x 1 2 cm rounded focus of hyperdensity in the left anterior parietal lobe with extensive associated vasogenic edema worrisome for a hemorrhagic mass  MR brain without and with contrast is advised for further evaluation   - GCS 15, non-focal  - Neurosurgery consulted recs appreciated   - MRI brain reveals SAH, unable to rule out underlying mass  - neurosurgery recommends obtaining CT C/A/P and f/u MRI brain in 6 weeks to evaluate for underlying mass lesion  This was discussed with patient and family    - [place] for 7 days for seizure prophylaxis  - Cleared to start lovenox for DVT ppx per neurosurgery  - Neuro checks to q4h  - Tylenol prn headache  - F/u with neurosurgery in 6 weeks  Will f/u CT C/A/P  Fall  Assessment & Plan  - mechanical fall in driveway  - sustained below stated injury  - PT/OT evaluations recommending inpatient rehab  - CM for dispo planning    Acute pain due to trauma  Assessment & Plan  - Tylenol prn headache    * Pathological fracture, left humerus, initial encounter for fracture  Assessment & Plan  - Xray showed acute proximal humerus fracture   - Patient currently in sling and NWB  - Appreciate orthopedics eval and recommendations: non-operative management recommended  - NV in tact  - PT/OT  - Concerning for osteoporosis  - f/u with orthopedics in 4 weeks      Bowel Regimen: senna, colace  VTE Prophylaxis:Enoxaparin (Lovenox)     Disposition: Medically stable for discharge pending placement  Subjective   Chief Complaint: "I'm not in pain"    Subjective: Pt reports that she is not in pain and she is feeling well  She is resting in the reclining chair  She denies shortness of breath, chest pain, abdominal pain, nausea, numbness or tingling  Objective   Vitals:   Temp:  [98 1 °F (36 7 °C)-98 2 °F (36 8 °C)] 98 2 °F (36 8 °C)  HR:  [72-86] 86  Resp:  [14-20] 16  BP: (109-130)/(46-63) 114/60    I/O       03/08 0701  03/09 0700 03/09 0701  03/10 0700 03/10 0701  03/11 0700    P  O   398     Total Intake(mL/kg)  398 (9 1)     Urine (mL/kg/hr)  925 (0 9)     Stool  0     Total Output  925     Net  -527            Unmeasured Urine Occurrence  7 x 1 x    Unmeasured Stool Occurrence 1 x 1 x 0 x           Physical Exam:   GENERAL APPEARANCE: Patient in no acute distress  HEENT: NCAT; PERRL, EOMs intact; Mucous membranes moist  CV: Regular rate and rhythm; no murmur/gallops/rubs appreciated  CHEST / LUNGS: Clear to auscultation; no wheezes/rales/rhonci  ABD: NABS; soft; non-distended; non-tender  : Voiding  EXT: LUE in sling, ecchymosis to proximal arm extending to the axilla; +2 pulses bilaterally upper & lower extremities; no edema  NEURO: GCS 15; no focal neurologic deficits; neurovascularly intact  SKIN: Warm, dry and well perfused; no rash; no jaundice  Invasive Devices     Peripheral Intravenous Line  Duration           Peripheral IV 03/08/23 Right Wrist 1 day                      Lab Results: Results: I have personally reviewed all pertinent laboratory/tests results, BMP/CMP: No results found for: SODIUM, K, CL, CO2, ANIONGAP, BUN, CREATININE, GLUCOSE, CALCIUM, AST, ALT, ALKPHOS, PROT, BILITOT, EGFR and CBC: No results found for: WBC, HGB, HCT, MCV, PLT, ADJUSTEDWBC, MCH, MCHC, RDW, MPV, NRBC  Imaging: I have personally reviewed pertinent reports  see below  Other Studies:   CT chest abdomen pelvis w contrast   Final Result by Marylee Bailer, MD (03/10 1004)      No CT findings which suggest a potential primary for the lesion in the brain      A nonspecific 2 mm right upper lobe lung nodule, can be assessed for stability with follow-up at 6 months      Scattered granulomas   Proximal left humeral fracture      Workstation performed: [id number]         MRI brain w wo contrast   Final Result by Fredi Avalos DO (03/09 0620)      Subacute hemorrhage within the left parafalcine frontal lobe with moderate surrounding vasogenic edema and localized mass effect  No mass or acute ischemia identified within the adjacent brain parenchyma  Recommend repeat examination as hemorrhage    continues to resolve to exclude occult underlying lesion  Mild chronic microangiopathic change elsewhere within the brain  Workstation performed: [id number]         XR shoulder 1 vw left   Final Result by Татьяна Granado MD (03/09 0810)   Comminuted impacted fracture of the humeral head and neck is seen  IMPRESSION:      No dislocation        Workstation performed: [id number]

## 2023-03-10 NOTE — INCIDENTAL FINDINGS
The following findings require follow up:  Radiographic finding   Findin  A nonspecific 2 mm right upper lobe lung nodule, can be assessed for stability with follow-up at 6 months with CT chest     2  Scattered granulomas    3  Ascending aorta measures 3 4 cm no dissection flap seen    4  Rounded the cystic area seen in the left hepatic lobe measuring about 6 mm compatible with small cyst, stable     5  Subacute hemorrhage within the left parafalcine frontal lobe with moderate surrounding vasogenic edema and localized mass effect  No mass or acute ischemia identified within the adjacent brain parenchyma  Recommend repeat examination as hemorrhage continues to resolve to exclude occult underlying lesion  Repeat MRI Brain in 6 weeks with Neurosurgery follow up         Follow up required: Yes   Follow up should be done within 1 month(s)    Discussed with patient and her daughter and they expressed understanding      Please notify the following clinician to assist with the follow up:  Toshia Moe DO Phone   [phone]

## 2023-03-10 NOTE — QUICK NOTE
Patient and daughter updated regarding medical care  Patient's daughter concerned that patient possible had a stroke, with speech changes, visual changes and speech has improved to baseline, but the right sided vision is different than left-sided  We discussed in depth the MRI brain findings of acute on subacute hemorrhage and possibility that this affected her speech but that patient should follow up with Neurosurgery with 6 week repeat MRI brain and I will send a neurology referral as well  I updated them regarding the CT chest abdomen pelvis findings and all of their questions and concerns were answered to their satisfaction  They requested that I call their family friend Dr Hakeem Randolph to discuss patient's work-up and medical care, and I also answered all of his questions and concerns to his satisfaction as well

## 2023-03-10 NOTE — ASSESSMENT & PLAN NOTE
- CT head on 3/9 showed: 1 4 x 1 0 x 1 2 cm rounded focus of hyperdensity in the left anterior parietal lobe with extensive associated vasogenic edema worrisome for a hemorrhagic mass  MR brain without and with contrast is advised for further evaluation   - GCS 15, non-focal  - Neurosurgery consulted recs appreciated   - MRI brain reveals SAH, unable to rule out underlying mass  - neurosurgery recommends obtaining CT C/A/P and f/u MRI brain in 6 weeks to evaluate for underlying mass lesion  This was discussed with patient and family    - Delilah Jimenez for 7 days for seizure prophylaxis  - Cleared to start lovenox for DVT ppx per neurosurgery  - Neuro checks to q4h  - Tylenol prn headache  - F/u with neurosurgery in 6 weeks  Will f/u CT C/A/P

## 2023-03-11 RX ADMIN — DOCUSATE SODIUM 100 MG: 100 CAPSULE, LIQUID FILLED ORAL at 18:16

## 2023-03-11 RX ADMIN — LEVETIRACETAM 500 MG: 500 TABLET, FILM COATED ORAL at 21:51

## 2023-03-11 RX ADMIN — OXYCODONE HYDROCHLORIDE 2.5 MG: 5 TABLET ORAL at 18:16

## 2023-03-11 RX ADMIN — ACETAMINOPHEN 975 MG: 325 TABLET ORAL at 05:26

## 2023-03-11 RX ADMIN — CITALOPRAM HYDROBROMIDE 20 MG: 20 TABLET ORAL at 08:22

## 2023-03-11 RX ADMIN — OXYCODONE HYDROCHLORIDE 5 MG: 5 TABLET ORAL at 13:36

## 2023-03-11 RX ADMIN — Medication 250 MG: at 18:16

## 2023-03-11 RX ADMIN — ACETAMINOPHEN 975 MG: 325 TABLET ORAL at 21:51

## 2023-03-11 RX ADMIN — Medication 250 MG: at 08:22

## 2023-03-11 RX ADMIN — SENNOSIDES 17.2 MG: 8.6 TABLET, FILM COATED ORAL at 21:51

## 2023-03-11 RX ADMIN — LIDOCAINE 5% 1 PATCH: 700 PATCH TOPICAL at 08:22

## 2023-03-11 RX ADMIN — DOCUSATE SODIUM 100 MG: 100 CAPSULE, LIQUID FILLED ORAL at 08:22

## 2023-03-11 RX ADMIN — ACETAMINOPHEN 975 MG: 325 TABLET ORAL at 13:25

## 2023-03-11 RX ADMIN — BIMATOPROST 1 DROP: 0.1 SOLUTION/ DROPS OPHTHALMIC at 21:55

## 2023-03-11 RX ADMIN — LEVETIRACETAM 500 MG: 500 TABLET, FILM COATED ORAL at 08:22

## 2023-03-11 RX ADMIN — ENOXAPARIN SODIUM 30 MG: 30 INJECTION SUBCUTANEOUS at 08:22

## 2023-03-11 NOTE — PROGRESS NOTES
[facility]  Progress Note - Dave Kramer 1935, 82 y o  female MRN: [id number]  Unit/Bed#: [facility] 609-01 Encounter: [id number]  Primary Care Provider: Sabino Bacon DO   Date and time admitted to hospital: 3/8/2023  4:39 PM    Intraparenchymal hemorrhage of brain [facility])  Assessment & Plan  - CT head on 3/9 showed: 1 4 x 1 0 x 1 2 cm rounded focus of hyperdensity in the left anterior parietal lobe with extensive associated vasogenic edema worrisome for a hemorrhagic mass  MR brain without and with contrast is advised for further evaluation   - GCS 15, non-focal  - Neurosurgery consulted recs appreciated   - MRI brain reveals SAH, unable to rule out underlying mass  - neurosurgery recommends obtaining CT C/A/P and f/u MRI brain in 6 weeks to evaluate for underlying mass lesion  This was discussed with patient and family    - Marcos Fairly for 7 days for seizure prophylaxis  - Cleared to start lovenox for DVT ppx per neurosurgery  - Neuro checks to q4h  - Tylenol prn headache  - F/u with neurosurgery in 6 weeks  Will f/u CT C/A/P       Fall  Assessment & Plan  - mechanical fall in driveway  - sustained below stated injury  - PT/OT evaluations recommending inpatient rehab  - CM for dispo planning    Acute pain due to trauma  Assessment & Plan  - Tylenol prn headache    * Pathological fracture, left humerus, initial encounter for fracture  Assessment & Plan  - Xray showed acute proximal humerus fracture   - Patient currently in sling and NWB  - Appreciate orthopedics eval and recommendations: non-operative management recommended  - NV in tact  - PT/OT  - Concerning for osteoporosis  - f/u with orthopedics in 4 weeks        Bowel Regimen: colace and Senna  VTE Prophylaxis:Sequential compression device (Venodyne)  and Enoxaparin (Lovenox)     Disposition: rehab    Subjective   Chief Complaint: generalized soreness    Subjective: " Good Morning"     Objective   Vitals:   Temp: [98 °F (36 7 °C)-98 8 °F (37 1 °C)] 98 8 °F (37 1 °C)  HR:  [61-72] 67  Resp:  [16-18] 16  BP: (118-136)/(50-62) 131/50    I/O       03/09 0701  03/10 0700 03/10 0701  03/11 0700 03/11 0701  03/12 0700    P  O  398 225 240    Total Intake(mL/kg) 398 (9 1) 225 (5 2) 240 (5 5)    Urine (mL/kg/hr) 925 (0 9)      Stool 0      Total Output 925      Net -527 +225 +240           Unmeasured Urine Occurrence 7 x 1 x 1 x    Unmeasured Stool Occurrence 1 x 0 x 0 x           Physical Exam:   GENERAL APPEARANCE: sitting up in bed and comforable  NEURO: GCS - 15  HEENT: EOM's intact  CV: RRR< no complaints of chest pain  LUNGS: CTA bilaterally, no shortness of breath  GI: toleating a diet, bowel regimen  : voiding  MSK: moves extremities, LUE in a sling  SKIN: warm and dry    Invasive Devices     Peripheral Intravenous Line  Duration           Peripheral IV 03/08/23 Right Wrist 2 days                      Lab Results:none   Imaging: none  Other Studies: none

## 2023-03-12 LAB
ANION GAP SERPL CALCULATED.3IONS-SCNC: 2 MMOL/L (ref 4–13)
BASOPHILS # BLD AUTO: 0.03 THOUSANDS/ÂΜL (ref 0–0.1)
BASOPHILS NFR BLD AUTO: 1 % (ref 0–1)
BUN SERPL-MCNC: 30 MG/DL (ref 5–25)
CALCIUM SERPL-MCNC: 9.6 MG/DL (ref 8.3–10.1)
CHLORIDE SERPL-SCNC: 104 MMOL/L (ref 96–108)
CO2 SERPL-SCNC: 30 MMOL/L (ref 21–32)
CREAT SERPL-MCNC: 1.04 MG/DL (ref 0.6–1.3)
EOSINOPHIL # BLD AUTO: 0.19 THOUSAND/ÂΜL (ref 0–0.61)
EOSINOPHIL NFR BLD AUTO: 3 % (ref 0–6)
ERYTHROCYTE [DISTWIDTH] IN BLOOD BY AUTOMATED COUNT: 12.2 % (ref 11.6–15.1)
GFR SERPL CREATININE-BSD FRML MDRD: 49 ML/MIN/1.73SQ M
GLUCOSE SERPL-MCNC: 100 MG/DL (ref 65–140)
HCT VFR BLD AUTO: 33.5 % (ref 34.8–46.1)
HGB BLD-MCNC: 10.8 G/DL (ref 11.5–15.4)
IMM GRANULOCYTES # BLD AUTO: 0.02 THOUSAND/UL (ref 0–0.2)
IMM GRANULOCYTES NFR BLD AUTO: 0 % (ref 0–2)
LYMPHOCYTES # BLD AUTO: 1.21 THOUSANDS/ÂΜL (ref 0.6–4.47)
LYMPHOCYTES NFR BLD AUTO: 20 % (ref 14–44)
MCH RBC QN AUTO: 31.2 PG (ref 26.8–34.3)
MCHC RBC AUTO-ENTMCNC: 32.2 G/DL (ref 31.4–37.4)
MCV RBC AUTO: 97 FL (ref 82–98)
MONOCYTES # BLD AUTO: 0.71 THOUSAND/ÂΜL (ref 0.17–1.22)
MONOCYTES NFR BLD AUTO: 11 % (ref 4–12)
NEUTROPHILS # BLD AUTO: 4.06 THOUSANDS/ÂΜL (ref 1.85–7.62)
NEUTS SEG NFR BLD AUTO: 65 % (ref 43–75)
NRBC BLD AUTO-RTO: 0 /100 WBCS
PLATELET # BLD AUTO: 216 THOUSANDS/UL (ref 149–390)
PMV BLD AUTO: 10.7 FL (ref 8.9–12.7)
POTASSIUM SERPL-SCNC: 4.5 MMOL/L (ref 3.5–5.3)
RBC # BLD AUTO: 3.46 MILLION/UL (ref 3.81–5.12)
SODIUM SERPL-SCNC: 136 MMOL/L (ref 135–147)
WBC # BLD AUTO: 6.22 THOUSAND/UL (ref 4.31–10.16)

## 2023-03-12 RX ADMIN — CITALOPRAM HYDROBROMIDE 20 MG: 20 TABLET ORAL at 08:27

## 2023-03-12 RX ADMIN — Medication 250 MG: at 17:59

## 2023-03-12 RX ADMIN — ENOXAPARIN SODIUM 30 MG: 30 INJECTION SUBCUTANEOUS at 08:28

## 2023-03-12 RX ADMIN — LEVETIRACETAM 500 MG: 500 TABLET, FILM COATED ORAL at 08:27

## 2023-03-12 RX ADMIN — DOCUSATE SODIUM 100 MG: 100 CAPSULE, LIQUID FILLED ORAL at 17:59

## 2023-03-12 RX ADMIN — Medication 250 MG: at 08:27

## 2023-03-12 RX ADMIN — SENNOSIDES 17.2 MG: 8.6 TABLET, FILM COATED ORAL at 21:04

## 2023-03-12 RX ADMIN — ACETAMINOPHEN 975 MG: 325 TABLET ORAL at 06:02

## 2023-03-12 RX ADMIN — DOCUSATE SODIUM 100 MG: 100 CAPSULE, LIQUID FILLED ORAL at 08:28

## 2023-03-12 RX ADMIN — ACETAMINOPHEN 975 MG: 325 TABLET ORAL at 21:04

## 2023-03-12 RX ADMIN — LIDOCAINE 5% 1 PATCH: 700 PATCH TOPICAL at 08:28

## 2023-03-12 RX ADMIN — ACETAMINOPHEN 975 MG: 325 TABLET ORAL at 13:55

## 2023-03-12 RX ADMIN — BIMATOPROST 1 DROP: 0.1 SOLUTION/ DROPS OPHTHALMIC at 22:14

## 2023-03-12 RX ADMIN — LEVETIRACETAM 500 MG: 500 TABLET, FILM COATED ORAL at 20:55

## 2023-03-12 NOTE — CASE MANAGEMENT
Case Management Discharge Planning Note    Patient name Nimesh Gilliam  Location [facility] 664-89 MRN [id number]  : 1935 Date 3/12/2023       Current Admission Date: 3/8/2023  Current Admission Diagnosis:Pathological fracture, left humerus, initial encounter for fracture   Patient Active Problem List    Diagnosis Date Noted   • Acute pain due to trauma 2023   • Fall 2023   • Intraparenchymal hemorrhage of brain ([facility] [address] ) 2023   • Pathological fracture, left humerus, initial encounter for fracture 2023   • Generalized abdominal pain 2021   • Chronic idiopathic constipation 12/10/2020   • Colitis 2019   • Refusal of statin medication by patient 2019   • Medicare annual wellness visit, subsequent 2018   • Anxiety 2018   • Breast cancer ([facility] [address] ) 2018   • Esophageal reflux 2018   • Hiatal hernia 2018   • Hyperlipidemia 2018   • Post-menopausal osteoporosis 2018   • Refused influenza vaccine 2018      LOS (days): 4  Geometric Mean LOS (GMLOS) (days): 4 60  Days to GMLOS:0 9     OBJECTIVE:  Risk of Unplanned Readmission Score: 15 22         Current admission status: Inpatient   Preferred Pharmacy:   [place] [address]  Phone: [phone] Fax: [phone]    Primary Care Provider: Dania Zapien DO    Primary Insurance: [facility]  Secondary Insurance:     DISCHARGE DETAILS: Patient medically stable for DC tomorrow  Therapy to see today so that CM can submit for insurance authorization  Message sent to [facility], inquiring as to whether they can accept    [phone]- Message sent to [facility], inquiring as to whether their bed availability has changed and if they can accept patient    VM left with [facility] admissions, inquiring as to bed availability

## 2023-03-12 NOTE — QUICK NOTE
Advance care planning note;  Updated family at bedside today  Updated her on the current care plan as well as plan to possible discharge next 24 to 48 hours  We will continue to look for bed at [facility]  Patient family agreeable to this plan  We will follow-up with case management today  No other questions by family at this time  Will provide copy of AVS for patient daughter at bedside or DO electronically on day of discharge

## 2023-03-12 NOTE — PROGRESS NOTES
[facility]  Progress Note - Yajaira Hernandez 1935, 82 y o  female MRN: [id number]  [facility] Encounter: [id number]  Primary Care Provider: Steven Myles DO   Date and time admitted to hospital: 3/8/2023  4:39 PM    Intraparenchymal hemorrhage of brain [facility])  Assessment & Plan  - CT head on 3/9 showed: 1 4 x 1 0 x 1 2 cm rounded focus of hyperdensity in the left anterior parietal lobe with extensive associated vasogenic edema worrisome for a hemorrhagic mass  MR brain without and with contrast is advised for further evaluation   - GCS 15, non-focal  - Neurosurgery consulted recs appreciated   - MRI brain reveals SAH, unable to rule out underlying mass  - neurosurgery recommends obtaining CT C/A/P and f/u MRI brain in 6 weeks to evaluate for underlying mass lesion  This was discussed with patient and family    - [address] for 7 days for seizure prophylaxis  - Cleared to start lovenox for DVT ppx per neurosurgery  - Neuro checks to q4h  - Tylenol prn headache  - F/u with neurosurgery in 6 weeks  Fall  Assessment & Plan  - mechanical fall in driveway  - sustained below stated injury  - PT/OT evaluations recommending inpatient rehab  - CM for dispo planning    Acute pain due to trauma  Assessment & Plan  - Acute pain secondary to traumatic injuries  - Bowel regimen as long as using opioids   - Continue to monitor pain and adjust regimen as indicated  * Pathological fracture, left humerus, initial encounter for fracture  Assessment & Plan  - Xray showed acute proximal humerus fracture   - Patient currently in sling and NWB  - Appreciate orthopedics eval and recommendations: non-operative management recommended  - Neurovascularly intact  - PT/OT  - Concerning for osteoporosis  - f/u with orthopedics in 4 weeks    DVT Prophylaxis: SCDs and Lovenox  PT and OT: eval and treat    Disposition: DC planning  We will follow-up with case management    Called and updated daughter today over the phone and spoke with her regarding the current care plan  We will be happy to discuss with her in person at her request today  Subjective   Chief Complaint: No new complaints    Subjective: Patient is resting out of bed to chair  Objective   Vitals:   Temp:  [97 7 °F (36 5 °C)-98 6 °F (37 °C)] 98 2 °F (36 8 °C)  HR:  [65-83] 71  Resp:  [16-18] 18  BP: (114-141)/(50-59) 141/56    I/O       03/10 0701  03/11 0700 03/11 0701 03/12 0700 03/12 0701 03/13 0700    P  O  225 565     Total Intake(mL/kg) 225 (5 2) 565 (13)     Urine (mL/kg/hr)       Stool       Total Output       Net +225 +565            Unmeasured Urine Occurrence 1 x 6 x     Unmeasured Stool Occurrence 0 x 0 x            Physical Exam:   GENERAL APPEARANCE: NAD  NEURO: GCS 15  HEENT: Normocephalic  CV: RRR  LUNGS: CTA b/l  GI: Non-tender, non-distended  : no mancuso   MSK: moving all extremities  SKIN: warm, dry, intact    Invasive Devices     Peripheral Intravenous Line  Duration           Peripheral IV 03/08/23 Right Wrist 3 days                      Lab Results:   Results: I have personally reviewed all pertinent laboratory/tests results, BMP/CMP:   Lab Results   Component Value Date    SODIUM 136 03/12/2023    K 4 5 03/12/2023     03/12/2023    CO2 30 03/12/2023    BUN 30 (H) 03/12/2023    CREATININE 1 04 03/12/2023    CALCIUM 9 6 03/12/2023    EGFR 49 03/12/2023    and CBC:   Lab Results   Component Value Date    WBC 6 22 03/12/2023    HGB 10 8 (L) 03/12/2023    HCT 33 5 (L) 03/12/2023    MCV 97 03/12/2023     03/12/2023    MCH 31 2 03/12/2023    MCHC 32 2 03/12/2023    RDW 12 2 03/12/2023    MPV 10 7 03/12/2023    NRBC 0 03/12/2023     Imaging: I have personally reviewed pertinent reports       Other Studies: no other studies

## 2023-03-12 NOTE — ASSESSMENT & PLAN NOTE
- Xray showed acute proximal humerus fracture   - Patient currently in sling and NWB  - Appreciate orthopedics eval and recommendations: non-operative management recommended  - Neurovascularly intact  - PT/OT  - Concerning for osteoporosis  - f/u with orthopedics in 4 weeks

## 2023-03-12 NOTE — OCCUPATIONAL THERAPY NOTE
Occupational Therapy Treatment Note     03/12/23 0907   OT Last Visit   OT Visit Date 03/12/23   Note Type   Note Type Treatment for insurance authorization   Pain Assessment   Pain Assessment Tool 0-10   Pain Score No Pain   Restrictions/Precautions   Weight Bearing Precautions Per Order Yes   RUE Weight Bearing Per Order WBAT   LUE Weight Bearing Per Order NWB   RLE Weight Bearing Per Order WBAT   LLE Weight Bearing Per Order WBAT   Braces or Orthoses Sling   Other Precautions Cognitive; Chair Alarm; Fall Risk;WBS;Pain   Lifestyle   Autonomy I adls and mobility w/o ad - denies additional falls - family assists with iadls PRN   Reciprocal Relationships supportive family who are able to provide assist prn   Service to Others retired   [address] mostly sedentary   ADL   Where Assessed Sitting at [employer] Assistance 5  Supervision/Setup   Grooming Deficit Verbal cueing;Setup;Supervision/safety; Increased time to complete; Teeth care;Wash/dry hands; Wash/dry face;Brushing hair   UB Bathing Assistance 3  Moderate Assistance   UB Bathing Deficit Right arm;Left arm   LB Bathing Assistance 3  Moderate Assistance   LB Bathing Deficit Buttocks; Perineal area   UB Dressing Assistance 3  Moderate Assistance   UB Dressing Deficit Thread RUE; Thread LUE;Pull around back   LB Dressing Assistance 3  Moderate Assistance   LB Dressing Deficit Don/doff R shoe;Don/doff L shoe; Other (Comment); Pull up over hips  (instructed on one handed dressing technique)   Toileting Assistance  5  Supervision/Setup   Toileting Deficit Supervison/safety; Increased time to complete   Bed Mobility   Supine to Sit 5  Supervision   Additional items Increased time required;Assist x 1   Additional Comments pt impulsive and stood from chair with footrest extended   Transfers   Sit to Stand 4  Minimal assistance   Additional items Assist x 1   Stand to Sit 4  Minimal assistance   Additional items Assist x 1   Functional Mobility   Functional Mobility 4  Minimal assistance   Additional items Hand hold assistance   Subjective   Subjective pt stated "yes they let me go to the bathroom without them" /Pt however noted for middle bed alarm set up and pt railing up   Cognition   Overall Cognitive Status Impaired   Arousal/Participation Arousable; Cooperative   Attention Attends with cues to redirect   Orientation Level Oriented X4   Memory Decreased short term memory;Decreased recall of recent events;Decreased recall of precautions   Following Commands Follows one step commands with increased time or repetition   Activity Tolerance   Activity Tolerance Patient tolerated treatment well   Assessment   Assessment Pt seen for Occupational Therapy session with focus on activity tolerance, bed mob, functional transfers/mob, standing tolerance and balance for pt engagement in UB/LB self-care tasks and energy conservation techniques  Pt cleared by DAWIT/Magdalena for pt participated in OT session  Pt presented supine/HOB raised pt awake/alert and agreeable to participate in therapy following pt identifiers confirmed  Pt required assist for bed mob, functional transfers and functional mob  2* deconditioning and limited safety awareness  She was able to tolerate standing at bathroom sink for AM self-care and pt set up with chair at sink for one seated rest breaks  Pt tolerated session well with fairly good understanding of energy conservation techniques  Pt will benefit from post acute rehab service to continue to address these above noted pt deficit which currently impair pt ADL and functional mob  Pt return to sitting out of bed to bedside chair post session, chair alarm activated and all needs within reach     Plan   Treatment Interventions ADL retraining   Goal Expiration Date 03/23/23   OT Treatment Day 1   OT Frequency 2-3x/wk   Recommendation   OT Discharge Recommendation Post acute rehabilitation services   AM-PAC Daily Activity Inpatient   Lower Body Dressing 2 Bathing 2   Toileting 2   Upper Body Dressing 2   Grooming 3   Eating 4   Daily Activity Raw Score 15   Daily Activity Standardized Score (Calc for Raw Score >=11) 34 69   AM-PAC Applied Cognition Inpatient   Following a Speech/Presentation 3   Understanding Ordinary Conversation 4   Taking Medications 3   Remembering Where Things Are Placed or Put Away 3   Remembering List of 4-5 Errands 2   Taking Care of Complicated Tasks 2   Applied Cognition Raw Score 17   Applied Cognition Standardized Score 36 52   Barthel Index   Grooming Score 0   Dressing Score 5   Toilet Use Score 5   Transfers (Bed/Chair) Score 5   Mobility (Level Surface) Score 0       Jose Tammie WALKERA/ADALBERTO Forrest

## 2023-03-12 NOTE — PLAN OF CARE
Problem: Potential for Falls  Goal: Patient will remain free of falls  Description: INTERVENTIONS:  - Educate patient/family on patient safety including physical limitations  - Instruct patient to call for assistance with activity   - Consult OT/PT to assist with strengthening/mobility   - Keep Call bell within reach  - Keep bed low and locked with side rails adjusted as appropriate  - Keep care items and personal belongings within reach  - Initiate and maintain comfort rounds  - Make Fall Risk Sign visible to staff  - Offer Toileting every    Hours, in advance of need  - Initiate/Maintain   alarm  - Obtain necessary fall risk management equipment:     - Apply yellow socks and bracelet for high fall risk patients  - Consider moving patient to room near nurses station  Outcome: Progressing     Problem: Nutrition/Hydration-ADULT  Goal: Nutrient/Hydration intake appropriate for improving, restoring or maintaining nutritional needs  Description: Monitor and assess patient's nutrition/hydration status for malnutrition  Collaborate with interdisciplinary team and initiate plan and interventions as ordered  Monitor patient's weight and dietary intake as ordered or per policy  Utilize nutrition screening tool and intervene as necessary  Determine patient's food preferences and provide high-protein, high-caloric foods as appropriate       INTERVENTIONS:  - Monitor oral intake, urinary output, labs, and treatment plans  - Assess nutrition and hydration status and recommend course of action  - Evaluate amount of meals eaten  - Assist patient with eating if necessary   - Allow adequate time for meals  - Recommend/ encourage appropriate diets, oral nutritional supplements, and vitamin/mineral supplements  - Order, calculate, and assess calorie counts as needed  - Recommend, monitor, and adjust tube feedings and TPN/PPN based on assessed needs  - Assess need for intravenous fluids  - Provide specific nutrition/hydration education as appropriate  - Include patient/family/caregiver in decisions related to nutrition  Outcome: Progressing     Problem: MOBILITY - ADULT  Goal: Maintain or return to baseline ADL function  Description: INTERVENTIONS:  - Educate patient/family on patient safety including physical limitations  - Instruct patient to call for assistance with activity   - Consult OT/PT to assist with strengthening/mobility   - Keep Call bell within reach  - Keep bed low and locked with side rails adjusted as appropriate  - Keep care items and personal belongings within reach  - Initiate and maintain comfort rounds  - Make Fall Risk Sign visible to staff  - Offer Toileting every    Hours, in advance of need  - Initiate/Maintain   alarm  - Obtain necessary fall risk management equipment:     - Apply yellow socks and bracelet for high fall risk patients  - Consider moving patient to room near nurses station  Outcome: Progressing  Goal: Maintains/Returns to pre admission functional level  Description: INTERVENTIONS:  -  Assess patient's ability to carry out ADLs; assess patient's baseline for ADL function and identify physical deficits which impact ability to perform ADLs (bathing, care of mouth/teeth, toileting, grooming, dressing, etc )  - Assess/evaluate cause of self-care deficits   - Assess range of motion  - Assess patient's mobility; develop plan if impaired  - Assess patient's need for assistive devices and provide as appropriate  - Encourage maximum independence but intervene and supervise when necessary  - Involve family in performance of ADLs  - Assess for home care needs following discharge   - Consider OT consult to assist with ADL evaluation and planning for discharge  - Provide patient education as appropriate  Outcome: Progressing     Problem: SAFETY ADULT  Goal: Patient will remain free of falls  Description: INTERVENTIONS:  - Educate patient/family on patient safety including physical limitations  - Instruct patient to call for assistance with activity   - Consult OT/PT to assist with strengthening/mobility   - Keep Call bell within reach  - Keep bed low and locked with side rails adjusted as appropriate  - Keep care items and personal belongings within reach  - Initiate and maintain comfort rounds  - Make Fall Risk Sign visible to staff  - Offer Toileting every    Hours, in advance of need  - Initiate/Maintain   alarm  - Obtain necessary fall risk management equipment:     - Apply yellow socks and bracelet for high fall risk patients  - Consider moving patient to room near nurses station  Outcome: Progressing  Goal: Maintain or return to baseline ADL function  Description: INTERVENTIONS:  - Educate patient/family on patient safety including physical limitations  - Instruct patient to call for assistance with activity   - Consult OT/PT to assist with strengthening/mobility   - Keep Call bell within reach  - Keep bed low and locked with side rails adjusted as appropriate  - Keep care items and personal belongings within reach  - Initiate and maintain comfort rounds  - Make Fall Risk Sign visible to staff  - Offer Toileting every    Hours, in advance of need  - Initiate/Maintain   alarm  - Obtain necessary fall risk management equipment:   - Apply yellow socks and bracelet for high fall risk patients  - Consider moving patient to room near nurses station  Outcome: Progressing  Goal: Maintains/Returns to pre admission functional level  Description: INTERVENTIONS:  -  Assess patient's ability to carry out ADLs; assess patient's baseline for ADL function and identify physical deficits which impact ability to perform ADLs (bathing, care of mouth/teeth, toileting, grooming, dressing, etc )  - Assess/evaluate cause of self-care deficits   - Assess range of motion  - Assess patient's mobility; develop plan if impaired  - Assess patient's need for assistive devices and provide as appropriate  - Encourage maximum independence but intervene and supervise when necessary  - Involve family in performance of ADLs  - Assess for home care needs following discharge   - Consider OT consult to assist with ADL evaluation and planning for discharge  - Provide patient education as appropriate  Outcome: Progressing     Problem: Prexisting or High Potential for Compromised Skin Integrity  Goal: Skin integrity is maintained or improved  Description: INTERVENTIONS:  - Identify patients at risk for skin breakdown  - Assess and monitor skin integrity  - Assess and monitor nutrition and hydration status  - Monitor labs   - Assess for incontinence   - Turn and reposition patient  - Assist with mobility/ambulation  - Relieve pressure over bony prominences  - Avoid friction and shearing  - Provide appropriate hygiene as needed including keeping skin clean and dry  - Evaluate need for skin moisturizer/barrier cream  - Collaborate with interdisciplinary team   - Patient/family teaching  - Consider wound care consult   Outcome: Progressing

## 2023-03-12 NOTE — PLAN OF CARE
Problem: PHYSICAL THERAPY ADULT  Goal: Performs mobility at highest level of function for planned discharge setting  See evaluation for individualized goals  Description: Treatment/Interventions: Functional transfer training, LE strengthening/ROM, Elevations, Therapeutic exercise, Endurance training, Patient/family training, Equipment eval/education, Bed mobility, Gait training, Spoke to nursing, OT  Equipment Recommended: Negin Almendarez       See flowsheet documentation for full assessment, interventions and recommendations  Outcome: Progressing  Note: Prognosis: Fair  Problem List: Decreased strength, Decreased endurance, Impaired balance, Decreased mobility, Decreased safety awareness, Orthopedic restrictions, Pain  Assessment: Pt seen for PT treatment session this date  Therapy session focused on functional transfers, gait training and endurance training in order to improve overall mobility and independence  Pt requires assist of 1 for all mobility performed this date  Pt ambulating increased distances this date with HHA; increased RLE ER noted with fatigue  Pt requires standing vs seated rest break 2* fatigue  Occasional cues to maintain WBS on LUE  Naoma Mood Pt making progress toward goals  Pt was left sitting OOB in recliner at the end of PT session with all needs in reach  Pt would benefit from continued PT services while in hospital to address remaining limitations  PT to continue to follow pt and recommends STR  The patient's AM-PAC Basic Mobility Inpatient Short Form Raw Score is 16  A Raw score of less than or equal to 16 suggests the patient may benefit from discharge to post-acute rehabilitation services  Please also refer to the recommendation of the Physical Therapist for safe discharge planning  Barriers to Discharge: Inaccessible home environment     PT Discharge Recommendation: Post acute rehabilitation services    See flowsheet documentation for full assessment

## 2023-03-12 NOTE — ASSESSMENT & PLAN NOTE
- CT head on 3/9 showed: 1 4 x 1 0 x 1 2 cm rounded focus of hyperdensity in the left anterior parietal lobe with extensive associated vasogenic edema worrisome for a hemorrhagic mass  MR brain without and with contrast is advised for further evaluation   - GCS 15, non-focal  - Neurosurgery consulted recs appreciated   - MRI brain reveals SAH, unable to rule out underlying mass  - neurosurgery recommends obtaining CT C/A/P and f/u MRI brain in 6 weeks to evaluate for underlying mass lesion  This was discussed with patient and family    - Philippe Garza for 7 days for seizure prophylaxis  - Cleared to start lovenox for DVT ppx per neurosurgery  - Neuro checks to q4h  - Tylenol prn headache  - F/u with neurosurgery in 6 weeks

## 2023-03-12 NOTE — PHYSICAL THERAPY NOTE
PHYSICAL THERAPY NOTE          Patient Name: Noah PECK Date: 3/12/2023       03/12/23 1017   PT Last Visit   PT Visit Date 03/12/23   Note Type   Note Type Treatment for insurance authorization   Pain Assessment   Pain Assessment Tool 0-10   Pain Score No Pain   Restrictions/Precautions   Weight Bearing Precautions Per Order Yes   RUE Weight Bearing Per Order WBAT   LUE Weight Bearing Per Order (S)  NWB   RLE Weight Bearing Per Order WBAT   LLE Weight Bearing Per Order WBAT   Braces or Orthoses Sling   Other Precautions Cognitive; Chair Alarm; Bed Alarm; Fall Risk;Pain   General   Chart Reviewed Yes   Family/Caregiver Present No   Cognition   Overall Cognitive Status Impaired   Arousal/Participation Cooperative   Attention Within functional limits   Orientation Level Oriented to person;Oriented to time;Disoriented to place   Memory Decreased short term memory;Decreased recall of recent events;Decreased recall of precautions   Following Commands Follows one step commands with increased time or repetition   Comments pt pleasantly confusedm cooperative to participate in therapy session   Bed Mobility   Supine to Sit Unable to assess   Sit to Supine Unable to assess   Additional Comments pt sitting OOB in recliner upon arrival  Pt returned to sitting OOB in recliner with alarm on and all needs wihtin reach   Transfers   Sit to Stand 4  Minimal assistance   Additional items Assist x 1; Increased time required;Verbal cues   Stand to Sit 4  Minimal assistance   Additional items Assist x 1; Increased time required;Verbal cues   Additional Comments transfers with HHA   Ambulation/Elevation   Gait pattern Excessively slow; Short stride;Decreased foot clearance  (RLE ER)   Gait Assistance 4  Minimal assist   Additional items Assist x 1;Verbal cues; Tactile cues   Assistive Device Other (Comment)  (HHA to RUE)   Distance 40ft (standing rest) 60ft (seated rest) 60ft   Stair Management Assistance Not tested   Balance   Static Sitting Fair   Dynamic Sitting Fair -   Static Standing Poor +   Dynamic Standing Poor   Ambulatory Poor   Activity Tolerance   Activity Tolerance Patient tolerated treatment well   Nurse Made Aware RN cleared pt to participate in therapy session   Assessment   Prognosis Fair   Problem List Decreased strength;Decreased endurance; Impaired balance;Decreased mobility; Decreased safety awareness;Orthopedic restrictions;Pain   Assessment Pt seen for PT treatment session this date  Therapy session focused on functional transfers, gait training and endurance training in order to improve overall mobility and independence  Pt requires assist of 1 for all mobility performed this date  Pt ambulating increased distances this date with HHA; increased RLE ER noted with fatigue  Pt requires standing vs seated rest break 2* fatigue  Occasional cues to maintain WBS on [place] Elizaldee Carrier Pt making progress toward goals  Pt was left sitting OOB in recliner at the end of PT session with all needs in reach  Pt would benefit from continued PT services while in hospital to address remaining limitations  PT to continue to follow pt and recommends STR  The patient's AM-PAC Basic Mobility Inpatient Short Form Raw Score is 16  A Raw score of less than or equal to 16 suggests the patient may benefit from discharge to post-acute rehabilitation services  Please also refer to the recommendation of the Physical Therapist for safe discharge planning  Barriers to Discharge Inaccessible home environment   Goals   Patient Goals to go for a walk   STG Expiration Date 03/19/23   PT Treatment Day 1   Plan   Treatment/Interventions Functional transfer training;LE strengthening/ROM; Endurance training;Patient/family training;Gait training;Spoke to nursing;Spoke to case management   Progress Progressing toward goals   PT Frequency 3-5x/wk   Recommendation   PT Discharge Recommendation Post acute rehabilitation services   AM-PAC Basic Mobility Inpatient   Turning in Flat Bed Without Bedrails 3   Lying on Back to Sitting on Edge of Flat Bed Without Bedrails 3   Moving Bed to Chair 3   Standing Up From Chair Using Arms 3   Walk in Room 3   Climb 3-5 Stairs With Railing 1   Basic Mobility Inpatient Raw Score 16   Basic Mobility Standardized Score 38 32   Highest Level Of Mobility   JH-HLM Goal 5: Stand one or more mins   JH-HLM Achieved 7: Walk 25 feet or more   Education   Education Provided Mobility training   Patient Reinforcement needed   End of Consult   Patient Position at End of Consult Bedside chair;Bed/Chair alarm activated; All needs within reach     Dasia Goel, PT, DPT  '

## 2023-03-12 NOTE — PLAN OF CARE
Problem: OCCUPATIONAL THERAPY ADULT  Goal: Performs self-care activities at highest level of function for planned discharge setting  See evaluation for individualized goals  Description: Treatment Interventions: ADL retraining, Functional transfer training, UE strengthening/ROM, Patient/family training, Equipment evaluation/education, Compensatory technique education, Activityengagement          See flowsheet documentation for full assessment, interventions and recommendations  Outcome: Progressing  Note: Limitation: Decreased ADL status, Decreased UE ROM, Decreased Safe judgement during ADL, Decreased cognition, Decreased endurance, Decreased self-care trans, Decreased high-level ADLs, Non-func L UE  Prognosis: Good, Fair  Assessment: Pt seen for Occupational Therapy session with focus on activity tolerance, bed mob, functional transfers/mob, standing tolerance and balance for pt engagement in UB/LB self-care tasks and energy conservation techniques  Pt cleared by RN/Magdalena for pt participated in OT session  Pt presented supine/HOB raised pt awake/alert and agreeable to participate in therapy following pt identifiers confirmed  Pt required assist for bed mob, functional transfers and functional mob  2* deconditioning and limited safety awareness  She was able to tolerate standing at bathroom sink for AM self-care and pt set up with chair at sink for one seated rest breaks  Pt tolerated session well with fairly good understanding of energy conservation techniques  Pt will benefit from post acute rehab service to continue to address these above noted pt deficit which currently impair pt ADL and functional mob  Pt return to sitting out of bed to bedside chair post session, chair alarm activated and all needs within reach       OT Discharge Recommendation: Post acute rehabilitation services

## 2023-03-13 RX ADMIN — ACETAMINOPHEN 975 MG: 325 TABLET ORAL at 05:32

## 2023-03-13 RX ADMIN — Medication 250 MG: at 09:02

## 2023-03-13 RX ADMIN — CITALOPRAM HYDROBROMIDE 20 MG: 20 TABLET ORAL at 09:02

## 2023-03-13 RX ADMIN — BIMATOPROST 1 DROP: 0.1 SOLUTION/ DROPS OPHTHALMIC at 23:45

## 2023-03-13 RX ADMIN — ACETAMINOPHEN 975 MG: 325 TABLET ORAL at 13:15

## 2023-03-13 RX ADMIN — LEVETIRACETAM 500 MG: 500 TABLET, FILM COATED ORAL at 23:44

## 2023-03-13 RX ADMIN — LIDOCAINE 5% 1 PATCH: 700 PATCH TOPICAL at 09:03

## 2023-03-13 RX ADMIN — DOCUSATE SODIUM 100 MG: 100 CAPSULE, LIQUID FILLED ORAL at 09:02

## 2023-03-13 RX ADMIN — LEVETIRACETAM 500 MG: 500 TABLET, FILM COATED ORAL at 09:02

## 2023-03-13 RX ADMIN — DOCUSATE SODIUM 100 MG: 100 CAPSULE, LIQUID FILLED ORAL at 17:24

## 2023-03-13 RX ADMIN — SENNOSIDES 17.2 MG: 8.6 TABLET, FILM COATED ORAL at 23:44

## 2023-03-13 RX ADMIN — Medication 250 MG: at 17:24

## 2023-03-13 RX ADMIN — ACETAMINOPHEN 975 MG: 325 TABLET ORAL at 23:44

## 2023-03-13 RX ADMIN — ENOXAPARIN SODIUM 30 MG: 30 INJECTION SUBCUTANEOUS at 09:02

## 2023-03-13 NOTE — PLAN OF CARE
Problem: Potential for Falls  Goal: Patient will remain free of falls  Description: INTERVENTIONS:  - Educate patient/family on patient safety including physical limitations  - Instruct patient to call for assistance with activity   - Consult OT/PT to assist with strengthening/mobility   - Keep Call bell within reach  - Keep bed low and locked with side rails adjusted as appropriate  - Keep care items and personal belongings within reach  - Initiate and maintain comfort rounds  - Make Fall Risk Sign visible to staff  - Offer Toileting every  Hours, in advance of need  - Initiate/Maintain alarm  - Obtain necessary fall risk management equipment:   - Apply yellow socks and bracelet for high fall risk patients  - Consider moving patient to room near nurses station  Outcome: Progressing     Problem: Nutrition/Hydration-ADULT  Goal: Nutrient/Hydration intake appropriate for improving, restoring or maintaining nutritional needs  Description: Monitor and assess patient's nutrition/hydration status for malnutrition  Collaborate with interdisciplinary team and initiate plan and interventions as ordered  Monitor patient's weight and dietary intake as ordered or per policy  Utilize nutrition screening tool and intervene as necessary  Determine patient's food preferences and provide high-protein, high-caloric foods as appropriate       INTERVENTIONS:  - Monitor oral intake, urinary output, labs, and treatment plans  - Assess nutrition and hydration status and recommend course of action  - Evaluate amount of meals eaten  - Assist patient with eating if necessary   - Allow adequate time for meals  - Recommend/ encourage appropriate diets, oral nutritional supplements, and vitamin/mineral supplements  - Order, calculate, and assess calorie counts as needed  - Recommend, monitor, and adjust tube feedings and TPN/PPN based on assessed needs  - Assess need for intravenous fluids  - Provide specific nutrition/hydration education as appropriate  - Include patient/family/caregiver in decisions related to nutrition  Outcome: Progressing     Problem: MOBILITY - ADULT  Goal: Maintain or return to baseline ADL function  Description: INTERVENTIONS:  -  Assess patient's ability to carry out ADLs; assess patient's baseline for ADL function and identify physical deficits which impact ability to perform ADLs (bathing, care of mouth/teeth, toileting, grooming, dressing, etc )  - Assess/evaluate cause of self-care deficits   - Assess range of motion  - Assess patient's mobility; develop plan if impaired  - Assess patient's need for assistive devices and provide as appropriate  - Encourage maximum independence but intervene and supervise when necessary  - Involve family in performance of ADLs  - Assess for home care needs following discharge   - Consider OT consult to assist with ADL evaluation and planning for discharge  - Provide patient education as appropriate  Outcome: Progressing  Goal: Maintains/Returns to pre admission functional level  Description: INTERVENTIONS:  - Perform BMAT or MOVE assessment daily    - Set and communicate daily mobility goal to care team and patient/family/caregiver  - Collaborate with rehabilitation services on mobility goals if consulted  - Perform Range of Motion  times a day  - Reposition patient every  hours    - Dangle patient  times a day  - Stand patient  times a day  - Ambulate patient  times a day  - Out of bed to chair  times a day   - Out of bed for meals  times a day  - Out of bed for toileting  - Record patient progress and toleration of activity level   Outcome: Progressing     Problem: SAFETY ADULT  Goal: Patient will remain free of falls  Description: INTERVENTIONS:  - Educate patient/family on patient safety including physical limitations  - Instruct patient to call for assistance with activity   - Consult OT/PT to assist with strengthening/mobility   - Keep Call bell within reach  - Keep bed low and locked with side rails adjusted as appropriate  - Keep care items and personal belongings within reach  - Initiate and maintain comfort rounds  - Make Fall Risk Sign visible to staff  - Offer Toileting every  Hours, in advance of need  - Initiate/Maintain alarm  - Obtain necessary fall risk management equipment:   - Apply yellow socks and bracelet for high fall risk patients  - Consider moving patient to room near nurses station  Outcome: Progressing  Goal: Maintain or return to baseline ADL function  Description: INTERVENTIONS:  -  Assess patient's ability to carry out ADLs; assess patient's baseline for ADL function and identify physical deficits which impact ability to perform ADLs (bathing, care of mouth/teeth, toileting, grooming, dressing, etc )  - Assess/evaluate cause of self-care deficits   - Assess range of motion  - Assess patient's mobility; develop plan if impaired  - Assess patient's need for assistive devices and provide as appropriate  - Encourage maximum independence but intervene and supervise when necessary  - Involve family in performance of ADLs  - Assess for home care needs following discharge   - Consider OT consult to assist with ADL evaluation and planning for discharge  - Provide patient education as appropriate  Outcome: Progressing  Goal: Maintains/Returns to pre admission functional level  Description: INTERVENTIONS:  - Perform BMAT or MOVE assessment daily    - Set and communicate daily mobility goal to care team and patient/family/caregiver  - Collaborate with rehabilitation services on mobility goals if consulted  - Perform Range of Motion  times a day  - Reposition patient every  hours    - Dangle patient  times a day  - Stand patient  times a day  - Ambulate patient  times a day  - Out of bed to chair  times a day   - Out of bed for meals  times a day  - Out of bed for toileting  - Record patient progress and toleration of activity level   Outcome: Progressing     Problem: Prexisting or High Potential for Compromised Skin Integrity  Goal: Skin integrity is maintained or improved  Description: INTERVENTIONS:  - Identify patients at risk for skin breakdown  - Assess and monitor skin integrity  - Assess and monitor nutrition and hydration status  - Monitor labs   - Assess for incontinence   - Turn and reposition patient  - Assist with mobility/ambulation  - Relieve pressure over bony prominences  - Avoid friction and shearing  - Provide appropriate hygiene as needed including keeping skin clean and dry  - Evaluate need for skin moisturizer/barrier cream  - Collaborate with interdisciplinary team   - Patient/family teaching  - Consider wound care consult   Outcome: Progressing     Problem: MOBILITY - ADULT  Goal: Maintain or return to baseline ADL function  Description: INTERVENTIONS:  -  Assess patient's ability to carry out ADLs; assess patient's baseline for ADL function and identify physical deficits which impact ability to perform ADLs (bathing, care of mouth/teeth, toileting, grooming, dressing, etc )  - Assess/evaluate cause of self-care deficits   - Assess range of motion  - Assess patient's mobility; develop plan if impaired  - Assess patient's need for assistive devices and provide as appropriate  - Encourage maximum independence but intervene and supervise when necessary  - Involve family in performance of ADLs  - Assess for home care needs following discharge   - Consider OT consult to assist with ADL evaluation and planning for discharge  - Provide patient education as appropriate  Outcome: Progressing  Goal: Maintains/Returns to pre admission functional level  Description: INTERVENTIONS:  - Perform BMAT or MOVE assessment daily    - Set and communicate daily mobility goal to care team and patient/family/caregiver  - Collaborate with rehabilitation services on mobility goals if consulted  - Perform Range of Motion  times a day  - Reposition patient every  hours    - Dangle patient  times a day  - Stand patient  times a day  - Ambulate patient  times a day  - Out of bed to chair  times a day   - Out of bed for meals  times a day  - Out of bed for toileting  - Record patient progress and toleration of activity level   Outcome: Progressing     Problem: SAFETY ADULT  Goal: Patient will remain free of falls  Description: INTERVENTIONS:  - Educate patient/family on patient safety including physical limitations  - Instruct patient to call for assistance with activity   - Consult OT/PT to assist with strengthening/mobility   - Keep Call bell within reach  - Keep bed low and locked with side rails adjusted as appropriate  - Keep care items and personal belongings within reach  - Initiate and maintain comfort rounds  - Make Fall Risk Sign visible to staff  - Offer Toileting every  Hours, in advance of need  - Initiate/Maintain alarm  - Obtain necessary fall risk management equipment:   - Apply yellow socks and bracelet for high fall risk patients  - Consider moving patient to room near nurses station  Outcome: Progressing  Goal: Maintain or return to baseline ADL function  Description: INTERVENTIONS:  -  Assess patient's ability to carry out ADLs; assess patient's baseline for ADL function and identify physical deficits which impact ability to perform ADLs (bathing, care of mouth/teeth, toileting, grooming, dressing, etc )  - Assess/evaluate cause of self-care deficits   - Assess range of motion  - Assess patient's mobility; develop plan if impaired  - Assess patient's need for assistive devices and provide as appropriate  - Encourage maximum independence but intervene and supervise when necessary  - Involve family in performance of ADLs  - Assess for home care needs following discharge   - Consider OT consult to assist with ADL evaluation and planning for discharge  - Provide patient education as appropriate  Outcome: Progressing  Goal: Maintains/Returns to pre admission functional level  Description: INTERVENTIONS:  - Perform BMAT or MOVE assessment daily    - Set and communicate daily mobility goal to care team and patient/family/caregiver  - Collaborate with rehabilitation services on mobility goals if consulted  - Perform Range of Motion  times a day  - Reposition patient every  hours    - Dangle patient  times a day  - Stand patient  times a day  - Ambulate patient  times a day  - Out of bed to chair  times a day   - Out of bed for meal times a day  - Out of bed for toileting  - Record patient progress and toleration of activity level   Outcome: Progressing     Problem: Prexisting or High Potential for Compromised Skin Integrity  Goal: Skin integrity is maintained or improved  Description: INTERVENTIONS:  - Identify patients at risk for skin breakdown  - Assess and monitor skin integrity  - Assess and monitor nutrition and hydration status  - Monitor labs   - Assess for incontinence   - Turn and reposition patient  - Assist with mobility/ambulation  - Relieve pressure over bony prominences  - Avoid friction and shearing  - Provide appropriate hygiene as needed including keeping skin clean and dry  - Evaluate need for skin moisturizer/barrier cream  - Collaborate with interdisciplinary team   - Patient/family teaching  - Consider wound care consult   Outcome: Progressing

## 2023-03-13 NOTE — ASSESSMENT & PLAN NOTE
- CT head on 3/9 showed: 1 4 x 1 0 x 1 2 cm rounded focus of hyperdensity in the left anterior parietal lobe with extensive associated vasogenic edema worrisome for a hemorrhagic mass  MR brain without and with contrast is advised for further evaluation   - GCS 15, non-focal  - Neurosurgery consulted recs appreciated   - MRI brain reveals SAH, unable to rule out underlying mass  - neurosurgery recommends obtaining CT C/A/P and f/u MRI brain in 6 weeks to evaluate for underlying mass lesion  This was discussed with patient and family    - Gabriel Romero for 7 days for seizure prophylaxis  - Cleared to start lovenox for DVT ppx per neurosurgery  - Neuro checks to q4h  - Tylenol prn headache  - F/u with neurosurgery in 6 weeks

## 2023-03-13 NOTE — CASE MANAGEMENT
Tim Haider 52 received request for authorization from Care Manager    Authorization request for: [facility]  Facility Name: [facility]  NPI: [id number]  Facility [facility]  NPI: [id number]  Authorization initiated by contacting insurance: [employer]  Via: Availity  Pending Reference #:[id number]   Clinicals submitted via:Availity attachment

## 2023-03-13 NOTE — PROGRESS NOTES
[address] [facility]  Progress Note - Mackenzie Junior 1935, 82 y o  female MRN: [id number]  Unit/Bed#: [facility] 609-01 Encounter: [id number]  Primary Care Provider: Angelo Lin DO   Date and time admitted to hospital: 3/8/2023  4:39 PM    Intraparenchymal hemorrhage of brain [facility])  Assessment & Plan  - CT head on 3/9 showed: 1 4 x 1 0 x 1 2 cm rounded focus of hyperdensity in the left anterior parietal lobe with extensive associated vasogenic edema worrisome for a hemorrhagic mass  MR brain without and with contrast is advised for further evaluation   - GCS 15, non-focal  - Neurosurgery consulted recs appreciated   - MRI brain reveals SAH, unable to rule out underlying mass  - neurosurgery recommends obtaining CT C/A/P and f/u MRI brain in 6 weeks to evaluate for underlying mass lesion  This was discussed with patient and family    - Shade Rm for 7 days for seizure prophylaxis  - Cleared to start lovenox for DVT ppx per neurosurgery  - Neuro checks to q4h  - Tylenol prn headache  - F/u with neurosurgery in 6 weeks  Fall  Assessment & Plan  - mechanical fall in driveway  - sustained below stated injury  - PT/OT evaluations recommending inpatient rehab  - CM for dispo planning    Acute pain due to trauma  Assessment & Plan  - Acute pain secondary to traumatic injuries  - Bowel regimen as long as using opioids   - Continue to monitor pain and adjust regimen as indicated  * Pathological fracture, left humerus, initial encounter for fracture  Assessment & Plan  - Xray showed acute proximal humerus fracture   - Patient currently in sling and NWB  - Appreciate orthopedics eval and recommendations: non-operative management recommended  - Neurovascularly intact  - PT/OT  - Concerning for osteoporosis  - f/u with orthopedics in 4 weeks    DVT Prophylaxis: SCDs and Lovenox  PT and OT: eval and treat    Disposition: DC planning  Anticipate discharge in next 24 hours    Awaiting placement  Attempted to contact daughter over the phone today; no answer  Subjective   Chief Complaint: No new complaints    Subjective: Patient offering no new complaints and on presentation  Out of bed and ambulatory this morning  Objective   Vitals:   Temp:  [98 4 °F (36 9 °C)-98 8 °F (37 1 °C)] 98 8 °F (37 1 °C)  HR:  [76-77] 77  Resp:  [17-18] 17  BP: (134-157)/(58-67) 148/67    I/O       03/11 0701  03/12 0700 03/12 0701  03/13 0700 03/13 0701  03/14 0700    P  O  565      Total Intake(mL/kg) 565 (13)      Net +565             Unmeasured Urine Occurrence 6 x 1 x     Unmeasured Stool Occurrence 0 x             Physical Exam:   GENERAL APPEARANCE: No acute distress  NEURO: GCS 15 regular rate and rhythm  HEENT: Normocephalic  CV: Regular rate and rhythm  LUNGS: CTA bilaterally  GI: Nontender, nondistended  : No Britton  MSK: +2 pulses on extremities  SKIN: Warm, dry, intact    Invasive Devices     None                       Lab Results: Results: I have personally reviewed all pertinent laboratory/tests results, BMP/CMP: No results found for: SODIUM, K, CL, CO2, ANIONGAP, BUN, CREATININE, GLUCOSE, CALCIUM, AST, ALT, ALKPHOS, PROT, BILITOT, EGFR and CBC: No results found for: WBC, HGB, HCT, MCV, PLT, ADJUSTEDWBC, MCH, MCHC, RDW, MPV, NRBC  Imaging: I have personally reviewed pertinent reports       Other Studies: no other studies

## 2023-03-13 NOTE — CASE MANAGEMENT
Case Management Discharge Planning Note    Patient name Sumeet Canseco  Location [facility] 609/[facility] 268-61 MRN [id number]  : 1935 Date 3/13/2023       Current Admission Date: 3/8/2023  Current Admission Diagnosis:Pathological fracture, left humerus, initial encounter for fracture   Patient Active Problem List    Diagnosis Date Noted   • Acute pain due to trauma 2023   • Fall 2023   • Intraparenchymal hemorrhage of brain ([facility] [address] ) 2023   • Pathological fracture, left humerus, initial encounter for fracture 2023   • Generalized abdominal pain 2021   • Chronic idiopathic constipation 12/10/2020   • Colitis 2019   • Refusal of statin medication by patient 2019   • Medicare annual wellness visit, subsequent 2018   • Anxiety 2018   • Breast cancer ([facility] [address] ) 2018   • Esophageal reflux 2018   • Hiatal hernia 2018   • Hyperlipidemia 2018   • Post-menopausal osteoporosis 2018   • Refused influenza vaccine 2018      LOS (days): 5  Geometric Mean LOS (GMLOS) (days): 4 60  Days to GMLOS:-0 2     OBJECTIVE:  Risk of Unplanned Readmission Score: 15 49         Current admission status: Inpatient   Preferred Pharmacy:   [place] [id number]  Phone: [phone] Fax: [phone]    Primary Care Provider: Kym Houser DO    Primary Insurance: Maridee Led [facility] REP  Secondary Insurance:     DISCHARGE DETAILS:    No bed at [facility], but [facility] can accept   will submit for insurance approval  Pt is in agreement with this place

## 2023-03-14 VITALS
HEART RATE: 75 BPM | SYSTOLIC BLOOD PRESSURE: 115 MMHG | BODY MASS INDEX: 20.77 KG/M2 | RESPIRATION RATE: 16 BRPM | DIASTOLIC BLOOD PRESSURE: 61 MMHG | WEIGHT: 96 LBS | TEMPERATURE: 97.7 F | OXYGEN SATURATION: 98 %

## 2023-03-14 LAB
FLUAV RNA RESP QL NAA+PROBE: NEGATIVE
FLUBV RNA RESP QL NAA+PROBE: NEGATIVE
RSV RNA RESP QL NAA+PROBE: NEGATIVE
SARS-COV-2 RNA RESP QL NAA+PROBE: NEGATIVE

## 2023-03-14 RX ORDER — LEVETIRACETAM 500 MG/1
500 TABLET ORAL EVERY 12 HOURS SCHEDULED
Qty: 4 TABLET | Refills: 0
Start: 2023-03-14 | End: 2023-03-16

## 2023-03-14 RX ORDER — ACETAMINOPHEN 325 MG/1
975 TABLET ORAL EVERY 8 HOURS SCHEDULED
Refills: 0
Start: 2023-03-14

## 2023-03-14 RX ADMIN — CITALOPRAM HYDROBROMIDE 20 MG: 20 TABLET ORAL at 08:37

## 2023-03-14 RX ADMIN — DOCUSATE SODIUM 100 MG: 100 CAPSULE, LIQUID FILLED ORAL at 08:37

## 2023-03-14 RX ADMIN — Medication 250 MG: at 08:37

## 2023-03-14 RX ADMIN — ENOXAPARIN SODIUM 30 MG: 30 INJECTION SUBCUTANEOUS at 08:37

## 2023-03-14 RX ADMIN — ACETAMINOPHEN 975 MG: 325 TABLET ORAL at 08:38

## 2023-03-14 RX ADMIN — LEVETIRACETAM 500 MG: 500 TABLET, FILM COATED ORAL at 08:37

## 2023-03-14 RX ADMIN — LIDOCAINE 5% 1 PATCH: 700 PATCH TOPICAL at 08:37

## 2023-03-14 NOTE — CASE MANAGEMENT
Case Management Discharge Planning Note    Patient name Dave Kramer  [facility] [id number]  : 1935 Date 3/14/2023       Current Admission Date: 3/8/2023  Current Admission Diagnosis:Pathological fracture, left humerus, initial encounter for fracture   Patient Active Problem List    Diagnosis Date Noted   • Acute pain due to trauma 2023   • Fall 2023   • Intraparenchymal hemorrhage of brain ([facility] [address] ) 2023   • Pathological fracture, left humerus, initial encounter for fracture 2023   • Generalized abdominal pain 2021   • Chronic idiopathic constipation 12/10/2020   • Colitis 2019   • Refusal of statin medication by patient 2019   • Medicare annual wellness visit, subsequent 2018   • Anxiety 2018   • Breast cancer ([facility] [address] ) 2018   • Esophageal reflux 2018   • Hiatal hernia 2018   • Hyperlipidemia 2018   • Post-menopausal osteoporosis 2018   • Refused influenza vaccine 2018      LOS (days): 6  Geometric Mean LOS (GMLOS) (days): 4 60  Days to GMLOS:-1 1     OBJECTIVE:  Risk of Unplanned Readmission Score: 15 68         Current admission status: Inpatient   Preferred Pharmacy:   [facility] [address] [place] [address]  Phone: [phone] Fax: [phone]    Primary Care Provider: Sabino Bacon DO    Primary Insurance: Hari [employer]  REP  Secondary Insurance:     DISCHARGE DETAILS:    Auth obtained  Pt medically stable for d/c today to [facility]   CM submitted for wheelchair [facility] transport and will follow up

## 2023-03-14 NOTE — PROGRESS NOTES
[address] [facility]  Progress Note - Allyne Fix 1935, 82 y o  female MRN: [id number]  Unit/Bed#: [facility] 609-01 Encounter: [id number]  Primary Care Provider: Sadie Moreno DO   Date and time admitted to hospital: 3/8/2023  4:39 PM    Intraparenchymal hemorrhage of brain [facility])  Assessment & Plan  - CT head on 3/9 showed: 1 4 x 1 0 x 1 2 cm rounded focus of hyperdensity in the left anterior parietal lobe with extensive associated vasogenic edema worrisome for a hemorrhagic mass  MR brain without and with contrast is advised for further evaluation   - GCS 15, non-focal  - Neurosurgery consulted recs appreciated   - MRI brain reveals SAH, unable to rule out underlying mass  - neurosurgery recommends obtaining CT C/A/P and f/u MRI brain in 6 weeks to evaluate for underlying mass lesion  This was discussed with patient and family    - Phoebe Chatman for 7 days for seizure prophylaxis  - Cleared to start lovenox for DVT ppx per neurosurgery  - Neuro checks to q4h  - Tylenol prn headache  - F/u with neurosurgery in 6 weeks  Fall  Assessment & Plan  - mechanical fall in driveway  - sustained below stated injury  - PT/OT evaluations recommending inpatient rehab  - CM for dispo planning    Acute pain due to trauma  Assessment & Plan  - Acute pain secondary to traumatic injuries  - Bowel regimen as long as using opioids   - Continue to monitor pain and adjust regimen as indicated  * Pathological fracture, left humerus, initial encounter for fracture  Assessment & Plan  - Xray showed acute proximal humerus fracture   - Patient currently in sling and NWB  - Appreciate orthopedics eval and recommendations: non-operative management recommended  - Neurovascularly intact  - PT/OT  - Concerning for osteoporosis  - f/u with orthopedics in 4 weeks    DVT prophylaxis: SCDs and Lovenox  PT and OT: eval and treat    Disposition: DC today      Subjective   Chief Complaint: No new complaints    Subjective: Patient offering no new complaints and on presentation  Objective   Vitals:   Temp:  [96 6 °F (35 9 °C)-97 7 °F (36 5 °C)] 97 7 °F (36 5 °C)  HR:  [78] 78  Resp:  [16-18] 18  BP: (140-171)/(60-82) 171/75    I/O       03/12 0701  03/13 0700 03/13 0701  03/14 0700 03/14 0701  03/15 0700    P  O  Total Intake(mL/kg)       Net              Unmeasured Urine Occurrence 1 x 1 x     Unmeasured Stool Occurrence  1 x            Physical Exam:   GENERAL APPEARANCE: No acute distress  NEURO: GCS 15  HEENT: Normocephalic  CV: Regular rate and rhythm  LUNGS: CTA bilaterally  GI: Nontender, nondistended  : No Britton  MSK: +2 pulses on extremities  SKIN: Warm, dry, intact    Invasive Devices     None                       Lab Results: Results: I have personally reviewed all pertinent laboratory/tests results, BMP/CMP: No results found for: SODIUM, K, CL, CO2, ANIONGAP, BUN, CREATININE, GLUCOSE, CALCIUM, AST, ALT, ALKPHOS, PROT, BILITOT, EGFR and CBC: No results found for: WBC, HGB, HCT, MCV, PLT, ADJUSTEDWBC, MCH, MCHC, RDW, MPV, NRBC  Imaging: I have personally reviewed pertinent reports       Other Studies: no other studies

## 2023-03-14 NOTE — ASSESSMENT & PLAN NOTE
- CT head on 3/9 showed: 1 4 x 1 0 x 1 2 cm rounded focus of hyperdensity in the left anterior parietal lobe with extensive associated vasogenic edema worrisome for a hemorrhagic mass  MR brain without and with contrast is advised for further evaluation   - GCS 15, non-focal  - Neurosurgery consulted recs appreciated   - MRI brain reveals SAH, unable to rule out underlying mass  - neurosurgery recommends obtaining CT C/A/P and f/u MRI brain in 6 weeks to evaluate for underlying mass lesion  This was discussed with patient and family    - Alexandre Price for 7 days for seizure prophylaxis  - Cleared to start lovenox for DVT ppx per neurosurgery  - Neuro checks to q4h  - Tylenol prn headache  - F/u with neurosurgery in 6 weeks

## 2023-03-14 NOTE — DISCHARGE SUMMARY
Discharge Summary - Matthew Kapoor 82 y o  female MRN: [id number]    Unit/Bed#: [facility] 566-72 Encounter: [id number]    Admission Date:   Admission Orders (From admission, onward)     Ordered        03/08/23 1716  Inpatient Admission  Once                        Admitting Diagnosis: Brain mass [G93 89]  Closed displaced fracture of surgical neck of left humerus, unspecified fracture morphology, initial encounter [S42 212A]  Unspecified multiple injuries, initial encounter [T07  XXXA]    HPI: per Jon Rodriguez, "Matthew Kapoor is a 82 y o  female who presents as a transfer from [facility] for evaluation of hemorrhagic brain mass as well as humeral fracture  Patient reports taht she had a fall yesterday when she was walking in her driveway  Patient fell forward hitting her head and left arm  +LOC  Patient did not go to hospital at that time, returned to baseline  Earlier this morning patient was found on the ground of her bedroom  Patient was brought to the hospital for further evaluation  Patient found to have:      1 4 x 1 0 x 1 2 cm rounded focus of hyperdensity in the left anterior parietal lobe with extensive associated vasogenic edema worrisome for a hemorrhagic mass     As well as Acute left proximal humerus fracture  Transferred to [facility] for further evaluation "    Procedures Performed: No orders of the defined types were placed in this encounter  Summary of Hospital Course: Patient is an 28-year-old female comes in for evaluation status post fall  Noted to have a left humeral fracture that appears pathologic as well as a potential traumatic brain injury however was deemed that this may have been a brain metastasis with some hemorrhage  She had an MRI that was completed during hospital course as well as a CT scan of her chest abdomen pelvis  All incidental findings were discussed with daughter over the phone and she expressed verbal understanding of these findings    Upon discharge a copy of the AVS would be provided in the patient's discharge paperwork to be provided to the patient daughter at facility  She was in agreement with this plan  Patient will follow-up outpatient with her PCP, neurosurgery  She would also follow-up outpatient with orthopedics for the pathologic humeral fracture  Patient discharged on 3/14/2023  Daughter contacted on day of discharge regarding discharge planning in process and she was agreeable to plan  Significant Findings, Care, Treatment and Services Provided: XR chest 1 view portable    Addendum Date: 3/9/2023    ADDENDUM: Transcription error in the impression of the initial report  It should read: IMPRESSION: 1  Comminuted, retracted fracture of the left HUMERAL neck  2   No acute cardiopulmonary findings  Result Date: 3/9/2023  Impression: 1  Comminuted, retracted fracture of the left femoral neck  2   No acute cardiopulmonary findings  Note: I personally discussed this study with Sheryl Licea on 3/8/2023 at 3:42 PM  Workstation performed: [id number]     XR shoulder 1 vw left    Result Date: 3/9/2023  Impression: Comminuted impacted fracture of the humeral head and neck is seen  IMPRESSION: No dislocation  Workstation performed: [id number]     XR humerus LEFT    Result Date: 3/8/2023  Impression: Acute left proximal humerus fracture  Workstation performed: [id number]     XR elbow 2 vw left    Result Date: 3/9/2023  Impression: No acute osseous abnormality  Workstation performed: [id number]     XR knee 4+ vw left injury    Result Date: 3/9/2023  Impression: No acute osseous abnormality  Workstation performed: [id number]     XR knee 4+ vw right injury    Result Date: 3/9/2023  Impression: No acute osseous abnormality  Degenerative changes as described  Workstation performed: [id number]     CT head without contrast    Addendum Date: 3/9/2023    ADDENDUM: Please note the following correction to the report:  The hemorrhage is in the left frontal lobe, not parietal lobe  Result Date: 3/9/2023  Impression: 1 4 x 1 0 x 1 2 cm rounded focus of hyperdensity in the left anterior parietal lobe with extensive associated vasogenic edema worrisome for a hemorrhagic mass  MR brain without and with contrast is advised for further evaluation  I personally discussed this study with Sharon Hsu on 3/8/2023 at 2:03 PM  Workstation performed: [id number]     CT spine cervical without contrast    Result Date: 3/8/2023  Impression: No acute cervical spine fracture or traumatic malalignment  Workstation performed: [id number]     MRI brain w wo contrast    Result Date: 3/9/2023  Impression: Subacute hemorrhage within the left parafalcine frontal lobe with moderate surrounding vasogenic edema and localized mass effect  No mass or acute ischemia identified within the adjacent brain parenchyma  Recommend repeat examination as hemorrhage continues to resolve to exclude occult underlying lesion  Mild chronic microangiopathic change elsewhere within the brain  Workstation performed: [id number]     CT chest abdomen pelvis w contrast    Result Date: 3/10/2023  Impression: No CT findings which suggest a potential primary for the lesion in the brain   A nonspecific 2 mm right upper lobe lung nodule, can be assessed for stability with follow-up at 6 months Scattered granulomas Proximal left humeral fracture Workstation performed: [id number]       Complications: no complication    Discharge Diagnosis:   Patient Active Problem List   Diagnosis   • Anxiety   • Breast cancer ([facility] [address] )   • Esophageal reflux   • Hiatal hernia   • Hyperlipidemia   • Post-menopausal osteoporosis   • Refused influenza vaccine   • Medicare annual wellness visit, subsequent   • Refusal of statin medication by patient   • Colitis   • Chronic idiopathic constipation   • Generalized abdominal pain   • Pathological fracture, left humerus, initial encounter for fracture   • Acute pain due to trauma   • Fall • Intraparenchymal hemorrhage of brain [facility])         Medical Problems     Resolved Problems  Date Reviewed: 3/14/2023   None         Condition at Discharge: good         Discharge instructions/Information to patient and family:   See after visit summary for information provided to patient and family  Provisions for Follow-Up Care:  See after visit summary for information related to follow-up care and any pertinent home health orders  PCP: Lexis Brito DO    Disposition: rehab    Planned Readmission: No      Discharge Statement   I spent 23 minutes discharging the patient  This time was spent on the day of discharge  I had direct contact with the patient on the day of discharge  Additional documentation is required if more than 30 minutes were spent on discharge  Discharge Medications:  See after visit summary for reconciled discharge medications provided to patient and family

## 2023-03-14 NOTE — INCIDENTAL FINDINGS
The following findings require follow up:  Radiographic finding   Findin  Left renal cyst      Follow up required: Yes   Follow up should be done within 2-4 week(s)    Please notify the following clinician to assist with the follow up:   Primary Care Provider  Discussed with daughter over the phone and she expressed verbal understanding of the above findings

## 2023-03-14 NOTE — CASE MANAGEMENT
Tim Haider 52 has received approved authorization from insurance:     Called in by Tara RDZ#  [phone]  Authorization received for: SNF  Facility: [facility] #:[id number]  Start of Care: 03/ 13/2023  Next Review Date:03/16/2023  Care Coordinator: Corona RDZ#: [phone]  Submit next review via fax to [phone] or the [address] [place] [address]  Care Manager notified: Amadeo Angel

## 2023-03-14 NOTE — RESTORATIVE TECHNICIAN NOTE
Restorative Technician Note      Patient Name: Quentin Zheng     Restorative Tech Visit Date: 03/14/23  Note Type: Mobility  Patient Position Upon Consult: Supine  Activity Performed: Transferred  Assistive Device: Other (Comment) (A)  Patient Position at End of Consult: Bedside chair;  All needs within reach; Bed/Chair alarm activated    Chucky Zimmerman Restorative Technician

## 2023-03-14 NOTE — PLAN OF CARE
Problem: Potential for Falls  Goal: Patient will remain free of falls  Description: INTERVENTIONS:  - Educate patient/family on patient safety including physical limitations  - Instruct patient to call for assistance with activity   - Consult OT/PT to assist with strengthening/mobility   - Keep Call bell within reach  - Keep bed low and locked with side rails adjusted as appropriate  - Keep care items and personal belongings within reach  - Initiate and maintain comfort rounds  - Make Fall Risk Sign visible to staff  - Offer Toileting every 1 Hours, in advance of need  - Initiate/Maintain alarm  - Obtain necessary fall risk management equipment  - Apply yellow socks and bracelet for high fall risk patients  - Consider moving patient to room near nurses station  3/14/2023 1303 by Silvana Hua, RN  Outcome: Adequate for Discharge  3/14/2023 1020 by Silvana Hua RN  Outcome: Progressing     Problem: Nutrition/Hydration-ADULT  Goal: Nutrient/Hydration intake appropriate for improving, restoring or maintaining nutritional needs  Description: Monitor and assess patient's nutrition/hydration status for malnutrition  Collaborate with interdisciplinary team and initiate plan and interventions as ordered  Monitor patient's weight and dietary intake as ordered or per policy  Utilize nutrition screening tool and intervene as necessary  Determine patient's food preferences and provide high-protein, high-caloric foods as appropriate       INTERVENTIONS:  - Monitor oral intake, urinary output, labs, and treatment plans  - Assess nutrition and hydration status and recommend course of action  - Evaluate amount of meals eaten  - Assist patient with eating if necessary   - Allow adequate time for meals  - Recommend/ encourage appropriate diets, oral nutritional supplements, and vitamin/mineral supplements  - Order, calculate, and assess calorie counts as needed  - Recommend, monitor, and adjust tube feedings and TPN/PPN based on assessed needs  - Assess need for intravenous fluids  - Provide specific nutrition/hydration education as appropriate  - Include patient/family/caregiver in decisions related to nutrition  3/14/2023 1303 by Pb Duran RN  Outcome: Adequate for Discharge  3/14/2023 1020 by Pb Duran RN  Outcome: Progressing     Problem: MOBILITY - ADULT  Goal: Maintain or return to baseline ADL function  Description: INTERVENTIONS:  -  Assess patient's ability to carry out ADLs; assess patient's baseline for ADL function and identify physical deficits which impact ability to perform ADLs (bathing, care of mouth/teeth, toileting, grooming, dressing, etc )  - Assess/evaluate cause of self-care deficits   - Assess range of motion  - Assess patient's mobility; develop plan if impaired  - Assess patient's need for assistive devices and provide as appropriate  - Encourage maximum independence but intervene and supervise when necessary  - Involve family in performance of ADLs  - Assess for home care needs following discharge   - Consider OT consult to assist with ADL evaluation and planning for discharge  - Provide patient education as appropriate  3/14/2023 1303 by Pb Duran RN  Outcome: Adequate for Discharge  3/14/2023 1020 by Pb Duran RN  Outcome: Progressing  Goal: Maintains/Returns to pre admission functional level  Description: INTERVENTIONS:  - Perform BMAT or MOVE assessment daily    - Set and communicate daily mobility goal to care team and patient/family/caregiver  - Collaborate with rehabilitation services on mobility goals if consulted  - Perform Range of Motion 3 times a day  - Reposition patient every 2 hours    - Dangle patient 3 times a day  - Stand patient 3 times a day  - Ambulate patient 3 times a day  - Out of bed to chair 3 times a day   - Out of bed for meals 3 times a day  - Out of bed for toileting  - Record patient progress and toleration of activity level   3/14/2023 1303 by Pb Duran RN  Outcome: Adequate for Discharge  3/14/2023 1020 by Jillian Arevalo RN  Outcome: Progressing     Problem: SAFETY ADULT  Goal: Patient will remain free of falls  Description: INTERVENTIONS:  - Educate patient/family on patient safety including physical limitations  - Instruct patient to call for assistance with activity   - Consult OT/PT to assist with strengthening/mobility   - Keep Call bell within reach  - Keep bed low and locked with side rails adjusted as appropriate  - Keep care items and personal belongings within reach  - Initiate and maintain comfort rounds  - Make Fall Risk Sign visible to staff  - Offer Toileting every 1 Hours, in advance of need  - Initiate/Maintain alarm  - Obtain necessary fall risk management equipment  - Apply yellow socks and bracelet for high fall risk patients  - Consider moving patient to room near nurses station  3/14/2023 1303 by Jillian Arevalo RN  Outcome: Adequate for Discharge  3/14/2023 1020 by Jillian Arevalo RN  Outcome: Progressing  Goal: Maintain or return to baseline ADL function  Description: INTERVENTIONS:  -  Assess patient's ability to carry out ADLs; assess patient's baseline for ADL function and identify physical deficits which impact ability to perform ADLs (bathing, care of mouth/teeth, toileting, grooming, dressing, etc )  - Assess/evaluate cause of self-care deficits   - Assess range of motion  - Assess patient's mobility; develop plan if impaired  - Assess patient's need for assistive devices and provide as appropriate  - Encourage maximum independence but intervene and supervise when necessary  - Involve family in performance of ADLs  - Assess for home care needs following discharge   - Consider OT consult to assist with ADL evaluation and planning for discharge  - Provide patient education as appropriate  3/14/2023 1303 by Jillian Arevalo RN  Outcome: Adequate for Discharge  3/14/2023 1020 by Jillian Arevalo RN  Outcome: Progressing  Goal: Maintains/Returns to pre admission functional level  Description: INTERVENTIONS:  - Perform BMAT or MOVE assessment daily    - Set and communicate daily mobility goal to care team and patient/family/caregiver  - Collaborate with rehabilitation services on mobility goals if consulted  - Perform Range of Motion   3 times a day  - Reposition patient every 2 hours  - Dangle patient 3 times a day  - Stand patient 3 times a day  - Ambulate patient 3 times a day  - Out of bed to chair 3 times a day   - Out of bed for meals 3 times a day  - Out of bed for toileting  - Record patient progress and toleration of activity level   3/14/2023 1303 by Марина Washington RN  Outcome: Adequate for Discharge  3/14/2023 1020 by Марина Washington RN  Outcome: Progressing     Problem: OCCUPATIONAL THERAPY ADULT  Goal: Performs self-care activities at highest level of function for planned discharge setting  See evaluation for individualized goals  Description: Treatment Interventions: ADL retraining, Functional transfer training, UE strengthening/ROM, Patient/family training, Equipment evaluation/education, Compensatory technique education, Activityengagement          See flowsheet documentation for full assessment, interventions and recommendations  Outcome: Adequate for Discharge     Problem: PHYSICAL THERAPY ADULT  Goal: Performs mobility at highest level of function for planned discharge setting  See evaluation for individualized goals  Description: Treatment/Interventions: Functional transfer training, LE strengthening/ROM, Elevations, Therapeutic exercise, Endurance training, Patient/family training, Equipment eval/education, Bed mobility, Gait training, Spoke to nursing, OT  Equipment Recommended: Ama Chavez       See flowsheet documentation for full assessment, interventions and recommendations    Outcome: Adequate for Discharge     Problem: Prexisting or High Potential for Compromised Skin Integrity  Goal: Skin integrity is maintained or improved  Description: INTERVENTIONS:  - Identify patients at risk for skin breakdown  - Assess and monitor skin integrity  - Assess and monitor nutrition and hydration status  - Monitor labs   - Assess for incontinence   - Turn and reposition patient  - Assist with mobility/ambulation  - Relieve pressure over bony prominences  - Avoid friction and shearing  - Provide appropriate hygiene as needed including keeping skin clean and dry  - Evaluate need for skin moisturizer/barrier cream  - Collaborate with interdisciplinary team   - Patient/family teaching  - Consider wound care consult   3/14/2023 1303 by Adin Joseph RN  Outcome: Adequate for Discharge  3/14/2023 1020 by Adin Joseph RN  Outcome: Progressing

## 2023-03-14 NOTE — PLAN OF CARE
Problem: Potential for Falls  Goal: Patient will remain free of falls  Description: INTERVENTIONS:  - Educate patient/family on patient safety including physical limitations  - Instruct patient to call for assistance with activity   - Consult OT/PT to assist with strengthening/mobility   - Keep Call bell within reach  - Keep bed low and locked with side rails adjusted as appropriate  - Keep care items and personal belongings within reach  - Initiate and maintain comfort rounds  - Make Fall Risk Sign visible to staff  - Offer Toileting every 1 Hours, in advance of need  - Initiate/Maintain alarm  - Obtain necessary fall risk management equipment  - Apply yellow socks and bracelet for high fall risk patients  - Consider moving patient to room near nurses station  Outcome: Progressing     Problem: Nutrition/Hydration-ADULT  Goal: Nutrient/Hydration intake appropriate for improving, restoring or maintaining nutritional needs  Description: Monitor and assess patient's nutrition/hydration status for malnutrition  Collaborate with interdisciplinary team and initiate plan and interventions as ordered  Monitor patient's weight and dietary intake as ordered or per policy  Utilize nutrition screening tool and intervene as necessary  Determine patient's food preferences and provide high-protein, high-caloric foods as appropriate       INTERVENTIONS:  - Monitor oral intake, urinary output, labs, and treatment plans  - Assess nutrition and hydration status and recommend course of action  - Evaluate amount of meals eaten  - Assist patient with eating if necessary   - Allow adequate time for meals  - Recommend/ encourage appropriate diets, oral nutritional supplements, and vitamin/mineral supplements  - Order, calculate, and assess calorie counts as needed  - Recommend, monitor, and adjust tube feedings and TPN/PPN based on assessed needs  - Assess need for intravenous fluids  - Provide specific nutrition/hydration education as appropriate  - Include patient/family/caregiver in decisions related to nutrition  Outcome: Progressing     Problem: MOBILITY - ADULT  Goal: Maintain or return to baseline ADL function  Description: INTERVENTIONS:  -  Assess patient's ability to carry out ADLs; assess patient's baseline for ADL function and identify physical deficits which impact ability to perform ADLs (bathing, care of mouth/teeth, toileting, grooming, dressing, etc )  - Assess/evaluate cause of self-care deficits   - Assess range of motion  - Assess patient's mobility; develop plan if impaired  - Assess patient's need for assistive devices and provide as appropriate  - Encourage maximum independence but intervene and supervise when necessary  - Involve family in performance of ADLs  - Assess for home care needs following discharge   - Consider OT consult to assist with ADL evaluation and planning for discharge  - Provide patient education as appropriate  Outcome: Progressing  Goal: Maintains/Returns to pre admission functional level  Description: INTERVENTIONS:  - Perform BMAT or MOVE assessment daily    - Set and communicate daily mobility goal to care team and patient/family/caregiver  - Collaborate with rehabilitation services on mobility goals if consulted  - Perform Range of Motion 3 times a day  - Reposition patient every 2 hours    - Dangle patient 3 times a day  - Stand patient 3 times a day  - Ambulate patient 3 times a day  - Out of bed to chair 3 times a day   - Out of bed for meals 3 times a day  - Out of bed for toileting  - Record patient progress and toleration of activity level   Outcome: Progressing     Problem: SAFETY ADULT  Goal: Patient will remain free of falls  Description: INTERVENTIONS:  - Educate patient/family on patient safety including physical limitations  - Instruct patient to call for assistance with activity   - Consult OT/PT to assist with strengthening/mobility   - Keep Call bell within reach  - Keep bed low and locked with side rails adjusted as appropriate  - Keep care items and personal belongings within reach  - Initiate and maintain comfort rounds  - Make Fall Risk Sign visible to staff  - Offer Toileting every 2 Hours, in advance of need  - Initiate/Maintain alarm  - Obtain necessary fall risk management equipment  - Apply yellow socks and bracelet for high fall risk patients  - Consider moving patient to room near nurses station  Outcome: Progressing  Goal: Maintain or return to baseline ADL function  Description: INTERVENTIONS:  -  Assess patient's ability to carry out ADLs; assess patient's baseline for ADL function and identify physical deficits which impact ability to perform ADLs (bathing, care of mouth/teeth, toileting, grooming, dressing, etc )  - Assess/evaluate cause of self-care deficits   - Assess range of motion  - Assess patient's mobility; develop plan if impaired  - Assess patient's need for assistive devices and provide as appropriate  - Encourage maximum independence but intervene and supervise when necessary  - Involve family in performance of ADLs  - Assess for home care needs following discharge   - Consider OT consult to assist with ADL evaluation and planning for discharge  - Provide patient education as appropriate  Outcome: Progressing  Goal: Maintains/Returns to pre admission functional level  Description: INTERVENTIONS:  - Perform BMAT or MOVE assessment daily    - Set and communicate daily mobility goal to care team and patient/family/caregiver  - Collaborate with rehabilitation services on mobility goals if consulted  - Perform Range of Motion 3 times a day  - Reposition patient every 2 hours    - Dangle patient 3 times a day  - Stand patient 3 times a day  - Ambulate patient 3 times a day  - Out of bed to chair 3 times a day   - Out of bed for meals 3 times a day  - Out of bed for toileting  - Record patient progress and toleration of activity level   Outcome: Progressing Problem: Prexisting or High Potential for Compromised Skin Integrity  Goal: Skin integrity is maintained or improved  Description: INTERVENTIONS:  - Identify patients at risk for skin breakdown  - Assess and monitor skin integrity  - Assess and monitor nutrition and hydration status  - Monitor labs   - Assess for incontinence   - Turn and reposition patient  - Assist with mobility/ambulation  - Relieve pressure over bony prominences  - Avoid friction and shearing  - Provide appropriate hygiene as needed including keeping skin clean and dry  - Evaluate need for skin moisturizer/barrier cream  - Collaborate with interdisciplinary team   - Patient/family teaching  - Consider wound care consult   Outcome: Progressing

## 2023-03-14 NOTE — CASE MANAGEMENT
Case Management Discharge Planning Note    Patient name Otilia Casas  [facility]-10 MRN [id number]  : 1935 Date 3/14/2023       Current Admission Date: 3/8/2023  Current Admission Diagnosis:Pathological fracture, left humerus, initial encounter for fracture   Patient Active Problem List    Diagnosis Date Noted   • Acute pain due to trauma 2023   • Fall 2023   • Intraparenchymal hemorrhage of brain ([facility] [address] ) 2023   • Pathological fracture, left humerus, initial encounter for fracture 2023   • Generalized abdominal pain 2021   • Chronic idiopathic constipation 12/10/2020   • Colitis 2019   • Refusal of statin medication by patient 2019   • Medicare annual wellness visit, subsequent 2018   • Anxiety 2018   • Breast cancer ([facility] [address] ) 2018   • Esophageal reflux 2018   • Hiatal hernia 2018   • Hyperlipidemia 2018   • Post-menopausal osteoporosis 2018   • Refused influenza vaccine 2018      LOS (days): 6  Geometric Mean LOS (GMLOS) (days): 4 60  Days to GMLOS:-1 1     OBJECTIVE:  Risk of Unplanned Readmission Score: 15 68         Current admission status: Inpatient   Preferred Pharmacy:   [place] [address]  Phone: [phone] Fax: [phone]    Primary Care Provider: Adriana Rizvi DO    Primary Insurance: Mabel Black [address] [facility] RE[address]  Secondary Insurance:     DISCHARGE DETAILS:    Transport at Discharge : Wheelchair van  Dispatcher Contacted: Yes  Number/Name of Dispatcher: Stefany Lucia by [employer] and Unit #):  [place]  ETA of Transport (Date): 23  ETA of Transport (Time): 1330     Transfer Mode: Wheelchair  Accompanied by: Alone     IMM Given (Date):: 23  IMM Given to[de-identified] Family  Family notified[de-identified] pt's dtr       Accepting Facility Name, [address] : [facility]  Receiving Facility/Agency Phone Number: [phone]  Facility/Agency Fax Number: [phone]      Pt will d/c today to [facility]   Pt's nurse and dtr

## 2023-03-14 NOTE — RESTORATIVE TECHNICIAN NOTE
Restorative Technician Note      Patient Name: Noah Rebolledo     Restorative Tech Visit Date: 03/14/23  Note Type: Mobility  Patient Position Upon Consult: Bedside chair  Activity Performed: Ambulated  Assistive Device: Other (Comment) (A)  Patient Position at End of Consult: Bedside chair;  All needs within reach; Bed/Chair alarm activated      Kelley Hicks Restorative Technician

## 2023-03-14 NOTE — RESTORATIVE TECHNICIAN NOTE
Restorative Technician Note      Patient Name: Nimesh Gilliam     Restorative Tech Visit Date: 03/14/23  Note Type: Mobility  Patient Position Upon Consult: Bedside chair  Activity Performed: Ambulated  Assistive Device: Other (Comment) (HHA)  Patient Position at End of Consult: Bedside chair;  All needs within reach; Bed/Chair alarm activated    Jeffery Malagon, Restorative Technician

## 2023-03-14 NOTE — CASE MANAGEMENT
Case Management Discharge Planning Note    Patient name Silvestre Child  Location [facility] 181-29 MRN [id number]  : 1935 Date 3/14/2023       Current Admission Date: 3/8/2023  Current Admission Diagnosis:Pathological fracture, left humerus, initial encounter for fracture   Patient Active Problem List    Diagnosis Date Noted   • Acute pain due to trauma 2023   • Fall 2023   • Intraparenchymal hemorrhage of brain ([facility] [address] ) 2023   • Pathological fracture, left humerus, initial encounter for fracture 2023   • Generalized abdominal pain 2021   • Chronic idiopathic constipation 12/10/2020   • Colitis 2019   • Refusal of statin medication by patient 2019   • Medicare annual wellness visit, subsequent 2018   • Anxiety 2018   • Breast cancer ([facility] [address] ) 2018   • Esophageal reflux 2018   • Hiatal hernia 2018   • Hyperlipidemia 2018   • Post-menopausal osteoporosis 2018   • Refused influenza vaccine 2018      LOS (days): 6  Geometric Mean LOS (GMLOS) (days): 4 60  Days to GMLOS:-1 1     OBJECTIVE:  Risk of Unplanned Readmission Score: 15 68         Current admission status: Inpatient   Preferred Pharmacy:   [address] [facility] [place] [address]  Phone: [phone] Fax: [phone]    Primary Care Provider: Nithya Macias DO    Primary Insurance: [facility] REP  Secondary Insurance:     [address] [place] [address] Avenue Number: [id number] ([address] )

## 2023-03-15 ENCOUNTER — TELEPHONE (OUTPATIENT)
Dept: OBGYN CLINIC | Facility: HOSPITAL | Age: 88
End: 2023-03-15

## 2023-03-15 NOTE — TELEPHONE ENCOUNTER
----- Message from Amy Roberts sent at 3/15/2023 10:44 AM EDT -----  Regarding: FW: scheduling  [facility]  [phone]  Needs appt around 4-9  ----- Message -----  From: Amy Roberts  Sent: 3/9/2023   7:55 AM EDT  To: Amy Roberts  Subject: FW: scheduling                                   Current admission      ----- Message -----  From: Richy Dorsey MD  Sent: 3/9/2023   7:40 AM EST  To: [facility]  Subject: scheduling                                       Hello! Can we please schedule this patient for follow up w Dr Shane Vasquez in 2 weeks for non op management of left proximal humerus fracture? Thanks!

## 2023-03-20 ENCOUNTER — TELEPHONE (OUTPATIENT)
Dept: OBGYN CLINIC | Facility: HOSPITAL | Age: 88
End: 2023-03-20

## 2023-03-20 NOTE — TELEPHONE ENCOUNTER
Caller: Fawn    Doctor/Office: Preet    Call regarding :  Returning call     Call was transferred to: Jeri Day

## 2023-03-20 NOTE — TELEPHONE ENCOUNTER
----- Message from Juliane Lafleur sent at 3/20/2023  9:41 AM EDT -----  Regarding: FW: scheduling  After being transferred 3 times and on hold for 20 minutes, I LVM for someone to contact me to set this up  Please forward this call to [facility]   ----- Message -----  From: Juliane Lafleur  Sent: 3/16/2023   4:58 PM EDT  To: Juliane Lafleur  Subject: FW: scheduling                                   Facility scheduled this pt for 4-24  Per Dr Nyasia Gonzalez, we can arrange for them to have an xray done instead, around the 4 week time frame, and images delivered to us to assist with care   ----- Message -----  From: Juliane Lafleur  Sent: 3/15/2023  10:44 AM EDT  To: Juliane Lafleur  Subject: FW: scheduling                                   LVM w/ [facility]  [phone]  Needs appt around 4-9  ----- Message -----  From: Juliane Lafleur  Sent: 3/9/2023   7:55 AM EDT  To: Juliane Lafleur  Subject: FW: scheduling                                   Current admission      ----- Message -----  From: oBbo Ferrara MD  Sent: 3/9/2023   7:40 AM EST  To: Barb [facility]spinosa Clinical  Subject: scheduling                                       Hello! Can we please schedule this patient for follow up w Dr Nyasia Gonzalez in 2 weeks for non op management of left proximal humerus fracture? Thanks!

## 2023-03-28 ENCOUNTER — HOSPITAL ENCOUNTER (EMERGENCY)
Facility: HOSPITAL | Age: 88
Discharge: HOME | End: 2023-03-28
Attending: EMERGENCY MEDICINE

## 2023-03-28 ENCOUNTER — APPOINTMENT (EMERGENCY)
Dept: CT IMAGING | Facility: HOSPITAL | Age: 88
End: 2023-03-28

## 2023-03-28 VITALS
SYSTOLIC BLOOD PRESSURE: 171 MMHG | OXYGEN SATURATION: 95 % | BODY MASS INDEX: 20.99 KG/M2 | WEIGHT: 100 LBS | TEMPERATURE: 99.1 F | DIASTOLIC BLOOD PRESSURE: 75 MMHG | RESPIRATION RATE: 19 BRPM | HEART RATE: 80 BPM | HEIGHT: 58 IN

## 2023-03-28 DIAGNOSIS — K52.9 COLITIS: Primary | ICD-10-CM

## 2023-03-28 DIAGNOSIS — I61.9 INTRAPARENCHYMAL HEMORRHAGE OF BRAIN (HCC): ICD-10-CM

## 2023-03-28 DIAGNOSIS — R10.9 ABDOMINAL PAIN: ICD-10-CM

## 2023-03-28 LAB
ALBUMIN SERPL BCP-MCNC: 3.7 G/DL (ref 3.5–5)
ALP SERPL-CCNC: 113 U/L (ref 34–104)
ALT SERPL W P-5'-P-CCNC: 17 U/L (ref 7–52)
ANION GAP SERPL CALCULATED.3IONS-SCNC: 8 MMOL/L (ref 4–13)
AST SERPL W P-5'-P-CCNC: 15 U/L (ref 13–39)
BASOPHILS # BLD AUTO: 0.01 THOUSANDS/ÂΜL (ref 0–0.1)
BASOPHILS NFR BLD AUTO: 0 % (ref 0–1)
BILIRUB SERPL-MCNC: 0.57 MG/DL (ref 0.2–1)
BUN SERPL-MCNC: 29 MG/DL (ref 5–25)
CALCIUM SERPL-MCNC: 9.1 MG/DL (ref 8.4–10.2)
CHLORIDE SERPL-SCNC: 101 MMOL/L (ref 96–108)
CO2 SERPL-SCNC: 26 MMOL/L (ref 21–32)
CREAT SERPL-MCNC: 0.99 MG/DL (ref 0.6–1.3)
EOSINOPHIL # BLD AUTO: 0.02 THOUSAND/ÂΜL (ref 0–0.61)
EOSINOPHIL NFR BLD AUTO: 0 % (ref 0–6)
ERYTHROCYTE [DISTWIDTH] IN BLOOD BY AUTOMATED COUNT: 13.4 % (ref 11.6–15.1)
GFR SERPL CREATININE-BSD FRML MDRD: 52 ML/MIN/1.73SQ M
GLUCOSE SERPL-MCNC: 108 MG/DL (ref 65–140)
HCT VFR BLD AUTO: 31.8 % (ref 34.8–46.1)
HGB BLD-MCNC: 10.4 G/DL (ref 11.5–15.4)
IMM GRANULOCYTES # BLD AUTO: 0.02 THOUSAND/UL (ref 0–0.2)
IMM GRANULOCYTES NFR BLD AUTO: 0 % (ref 0–2)
LIPASE SERPL-CCNC: 34 U/L (ref 11–82)
LYMPHOCYTES # BLD AUTO: 1.13 THOUSANDS/ÂΜL (ref 0.6–4.47)
LYMPHOCYTES NFR BLD AUTO: 16 % (ref 14–44)
MCH RBC QN AUTO: 32.4 PG (ref 26.8–34.3)
MCHC RBC AUTO-ENTMCNC: 32.7 G/DL (ref 31.4–37.4)
MCV RBC AUTO: 99 FL (ref 82–98)
MONOCYTES # BLD AUTO: 1.03 THOUSAND/ÂΜL (ref 0.17–1.22)
MONOCYTES NFR BLD AUTO: 15 % (ref 4–12)
NEUTROPHILS # BLD AUTO: 4.83 THOUSANDS/ÂΜL (ref 1.85–7.62)
NEUTS SEG NFR BLD AUTO: 69 % (ref 43–75)
NRBC BLD AUTO-RTO: 0 /100 WBCS
PLATELET # BLD AUTO: 347 THOUSANDS/UL (ref 149–390)
PMV BLD AUTO: 9.5 FL (ref 8.9–12.7)
POTASSIUM SERPL-SCNC: 4.5 MMOL/L (ref 3.5–5.3)
PROT SERPL-MCNC: 6.6 G/DL (ref 6.4–8.4)
RBC # BLD AUTO: 3.21 MILLION/UL (ref 3.81–5.12)
SODIUM SERPL-SCNC: 135 MMOL/L (ref 135–147)
WBC # BLD AUTO: 7.04 THOUSAND/UL (ref 4.31–10.16)

## 2023-03-28 RX ORDER — CIPROFLOXACIN 500 MG/1
500 TABLET, FILM COATED ORAL ONCE
Status: COMPLETED | OUTPATIENT
Start: 2023-03-28 | End: 2023-03-28

## 2023-03-28 RX ORDER — ONDANSETRON 4 MG/1
4 TABLET, ORALLY DISINTEGRATING ORAL EVERY 6 HOURS PRN
Qty: 20 TABLET | Refills: 0 | Status: SHIPPED | OUTPATIENT
Start: 2023-03-28

## 2023-03-28 RX ORDER — CIPROFLOXACIN 250 MG/1
250 TABLET, FILM COATED ORAL 2 TIMES DAILY
Qty: 6 TABLET | Refills: 0 | Status: SHIPPED | OUTPATIENT
Start: 2023-03-28 | End: 2023-03-31

## 2023-03-28 RX ADMIN — IOHEXOL 85 ML: 350 INJECTION, SOLUTION INTRAVENOUS at 20:08

## 2023-03-28 RX ADMIN — CIPROFLOXACIN 500 MG: 500 TABLET, FILM COATED ORAL at 21:42

## 2023-03-28 RX ADMIN — MORPHINE SULFATE 2 MG: 2 INJECTION, SOLUTION INTRAMUSCULAR; INTRAVENOUS at 18:53

## 2023-03-29 NOTE — ED PROVIDER NOTES
History  Chief Complaint   Patient presents with   • Abdominal Pain     Patient started with abd pain nausea and vomiting last night  53-year-old female complaining of abdominal pain nausea vomiting that began last night  Patient 1 episode of diarrhea and nausea and an episode of vomiting last evening  She states the pain persisted to the right side of her abdomen throughout the day today  She states she has not had any further episodes of diarrhea or vomiting but has had persistent nausea  However the pain has persisted and waxes and wanes throughout the day today  No previous episodes of similar pain  No recent travel  No recent antibiotics  No recent change in medications  No recent change in dietary habits  No family member sick with any similar symptoms  Prior to Admission Medications   Prescriptions Last Dose Informant Patient Reported? Taking?    Docusate Sodium (COLACE PO)   Yes No   Sig: Take by mouth   Probiotic Product (PROBIOTIC DAILY PO)   Yes No   Sig: Take by mouth   acetaminophen (TYLENOL) 325 mg tablet   No No   Sig: Take 3 tablets (975 mg total) by mouth every 8 (eight) hours   bimatoprost (LUMIGAN) 0 01 % ophthalmic drops   Yes No   Sig: Administer 1 drop to both eyes daily at bedtime   citalopram (CeleXA) 20 mg tablet   Yes No   Sig: Take 20 mg by mouth daily   levETIRAcetam (KEPPRA) 500 mg tablet   No No   Sig: Take 1 tablet (500 mg total) by mouth every 12 (twelve) hours for 4 doses      Facility-Administered Medications: None       Past Medical History:   Diagnosis Date   • [facility]   • Cellulitis of left upper arm    • History of anxiety    • History of appendicitis    • History of gastroesophageal reflux (GERD)        Past Surgical History:   Procedure Laterality Date   • APPENDECTOMY      Date Unknown   • CATARACT EXTRACTION Bilateral     Left 2008 // Right 2008   •  SECTION      Date Unknown   • HYSTERECTOMY     • MASTECTOMY Right 07/19/2007   • PARTIAL HYSTERECTOMY      fallopian tube removed, ? side   • TUBAL LIGATION      Date Unknown       Family History   Problem Relation Age of Onset   • Lung cancer Mother    • Colon cancer Sister    • Hypertension Family    • No Known Problems Father    • No Known Problems Daughter    • No Known Problems Maternal Grandmother    • No Known Problems Maternal Grandfather    • No Known Problems Paternal Grandmother    • No Known Problems Paternal Grandfather    • No Known Problems Sister    • Drug abuse Neg Hx      I have reviewed and agree with the history as documented  E-Cigarette/Vaping   • E-Cigarette Use Never User      E-Cigarette/Vaping Substances   • Nicotine No    • THC No    • CBD No    • Flavoring No    • Other No    • Unknown No      Social History     Tobacco Use   • Smoking status: Never   • Smokeless tobacco: Never   Vaping Use   • Vaping Use: Never used   Substance Use Topics   • Alcohol use: No   • Drug use: No       Review of Systems   Constitutional: Negative for appetite change, chills, fatigue, fever and unexpected weight change  HENT: Negative for congestion, ear pain, rhinorrhea and sore throat  Eyes: Negative for pain and visual disturbance  Respiratory: Negative for cough, chest tightness, shortness of breath and wheezing  Cardiovascular: Negative for chest pain, palpitations and leg swelling  Gastrointestinal: Positive for abdominal pain, diarrhea, nausea and vomiting  Negative for constipation  Genitourinary: Negative for difficulty urinating, dysuria, frequency, hematuria, menstrual problem, pelvic pain, vaginal bleeding and vaginal discharge  Musculoskeletal: Negative for arthralgias, back pain and neck pain  Skin: Negative for color change and rash  Neurological: Negative for dizziness, seizures, syncope, light-headedness and headaches  Psychiatric/Behavioral: Negative for sleep disturbance  The patient is not nervous/anxious      All other systems reviewed and are negative  Physical Exam  Physical Exam  Vitals and nursing note reviewed  Constitutional:       General: She is not in acute distress  Appearance: Normal appearance  She is well-developed and normal weight  She is not ill-appearing, toxic-appearing or diaphoretic  HENT:      Head: Normocephalic and atraumatic  Nose: Nose normal       Mouth/Throat:      Mouth: Mucous membranes are moist       Pharynx: Oropharynx is clear  Eyes:      General: No scleral icterus  Extraocular Movements: Extraocular movements intact  Conjunctiva/sclera: Conjunctivae normal    Cardiovascular:      Rate and Rhythm: Normal rate and regular rhythm  Pulses: Normal pulses  Heart sounds: Normal heart sounds  No murmur heard  No friction rub  No gallop  Pulmonary:      Effort: Pulmonary effort is normal  No respiratory distress  Breath sounds: Normal breath sounds  No wheezing or rales  Abdominal:      General: There is no distension  Palpations: Abdomen is soft  There is no mass or pulsatile mass  Tenderness: There is abdominal tenderness in the right upper quadrant, right lower quadrant, epigastric area, periumbilical area and suprapubic area  There is no right CVA tenderness, left CVA tenderness, guarding or rebound  Hernia: No hernia is present  Musculoskeletal:         General: No swelling  Normal range of motion  Cervical back: Normal range of motion and neck supple  No rigidity or tenderness  Lymphadenopathy:      Cervical: No cervical adenopathy  Skin:     General: Skin is warm and dry  Capillary Refill: Capillary refill takes less than 2 seconds  Coloration: Skin is not jaundiced or pale  Findings: No lesion or rash  Neurological:      General: No focal deficit present  Mental Status: She is alert and oriented to person, place, and time     Psychiatric:         Mood and Affect: Mood normal          Behavior: Behavior normal          Vital Signs  ED Triage Vitals   Temperature Pulse Respirations Blood Pressure SpO2   03/28/23 1810 03/28/23 1812 03/28/23 1812 03/28/23 1812 03/28/23 1812   99 1 °F (37 3 °C) 82 20 (!) 171/75 99 %      Temp Source Heart Rate Source Patient Position - Orthostatic VS BP Location FiO2 (%)   03/28/23 1810 03/28/23 1812 -- -- --   Temporal Monitor         Pain Score       03/28/23 1812       7           Vitals:    03/28/23 1812 03/28/23 1945 03/28/23 2100   BP: (!) 171/75     Pulse: 82 72 80         Visual Acuity      ED Medications  Medications   morphine injection 2 mg (2 mg Intravenous Given 3/28/23 1853)   iohexol (OMNIPAQUE) 350 MG/ML injection (SINGLE-DOSE) 85 mL (85 mL Intravenous Given 3/28/23 2008)   ciprofloxacin (CIPRO) tablet 500 mg (500 mg Oral Given 3/28/23 2142)       Diagnostic Studies  Results Reviewed     Procedure Component Value Units Date/Time    CMP [[id number]]  (Abnormal) Collected: 03/28/23 1853    Lab Status: Final result Specimen: Blood from Arm, Right Updated: 03/28/23 1917     Sodium 135 mmol/L      Potassium 4 5 mmol/L      Chloride 101 mmol/L      CO2 26 mmol/L      ANION GAP 8 mmol/L      BUN 29 mg/dL      Creatinine 0 99 mg/dL      Glucose 108 mg/dL      Calcium 9 1 mg/dL      AST 15 U/L      ALT 17 U/L      Alkaline Phosphatase 113 U/L      Total Protein 6 6 g/dL      Albumin 3 7 g/dL      Total Bilirubin 0 57 mg/dL      eGFR 52 ml/min/1 73sq m     Narrative:      Lynne guidelines for Chronic Kidney Disease (CKD):   •  Stage 1 with normal or high GFR (GFR > 90 mL/min/1 73 square meters)  •  Stage 2 Mild CKD (GFR = 60-89 mL/min/1 73 square meters)  •  Stage 3A Moderate CKD (GFR = 45-59 mL/min/1 73 square meters)  •  Stage 3B Moderate CKD (GFR = 30-44 mL/min/1 73 square meters)  •  Stage 4 Severe CKD (GFR = 15-29 mL/min/1 73 square meters)  •  Stage 5 End Stage CKD (GFR <15 mL/min/1 73 square meters)  Note: GFR calculation is accurate only with a steady state creatinine    Lipase [[id number]]  (Normal) Collected: 03/28/23 1853    Lab Status: Final result Specimen: Blood from Arm, Right Updated: 03/28/23 1917     Lipase 34 u/L     CBC and differential [[id number]]  (Abnormal) Collected: 03/28/23 1853    Lab Status: Final result Specimen: Blood from Arm, Right Updated: 03/28/23 1903     WBC 7 04 Thousand/uL      RBC 3 21 Million/uL      Hemoglobin 10 4 g/dL      Hematocrit 31 8 %      MCV 99 fL      MCH 32 4 pg      MCHC 32 7 g/dL      RDW 13 4 %      MPV 9 5 fL      Platelets 521 Thousands/uL      nRBC 0 /100 WBCs      Neutrophils Relative 69 %      Immat GRANS % 0 %      Lymphocytes Relative 16 %      Monocytes Relative 15 %      Eosinophils Relative 0 %      Basophils Relative 0 %      Neutrophils Absolute 4 83 Thousands/µL      Immature Grans Absolute 0 02 Thousand/uL      Lymphocytes Absolute 1 13 Thousands/µL      Monocytes Absolute 1 03 Thousand/µL      Eosinophils Absolute 0 02 Thousand/µL      Basophils Absolute 0 01 Thousands/µL     UA w Reflex to Microscopic w Reflex to Culture [[id number]]     Lab Status: No result Specimen: Urine                  CT Abdomen pelvis with contrast   Final Result by Gaston Hidalgo MD (03/28 2116)      Severe wall thickening involving the descending colon, sigmoid colon and rectum consistent with colitis  Fluid contents in the colon indicating a diarrheal illness  Moderate-sized sliding-type hiatal hernia  Workstation performed: [id number]         MRI brain w wo contrast    (Results Pending)              Procedures  Procedures         ED Course                                             Medical Decision Making  82-year-old female presenting with 1 day history of right-sided abdominal pain  1 episode of vomiting and diarrhea that is since subsided  Pain is persisted  Previous records reviewed    Patient is here with her daughter    Amount and/or Complexity of Data Reviewed  Independent Historian: caregiver     Details: Daughter  Labs: ordered  Decision-making details documented in ED Course  Radiology: ordered and independent interpretation performed  Decision-making details documented in ED Course  Risk  OTC drugs  Prescription drug management  Decision regarding hospitalization  Risk Details: We will treat with 3-day course of ciprofloxacin, no other acute intra-abdominal pathology  No evidence for aortic dissection or ruptured abdominal aortic aneurysm  Normal renal function  No evidence for pancreatitis  No chest pain or shortness of breath  Remains afebrile  Disposition  Final diagnoses:   Colitis   Abdominal pain     Time reflects when diagnosis was documented in both MDM as applicable and the Disposition within this note     Time User Action Codes Description Comment    3/28/2023  8:03 PM Release User, Automatic Add [I61 9] Intraparenchymal hemorrhage of brain ([facility] [address] )     3/28/2023  9:45 PM Essence Doherty T Add [K52 9] Colitis     3/28/2023  9:45 PM Daryla Doherty T Add [R10 9] Abdominal pain       ED Disposition     ED Disposition   Discharge    Condition   Stable    Date/Time   Tue Mar 28, 2023  9:45 PM    Comment   45 oRcio Lowery discharge to home/self care                 Follow-up Information     Follow up With Specialties Details Why Contact Amari Day DO Family Medicine Schedule an appointment as soon as possible for a visit   Liane Boswell [address] [facility]  [phone]            Patient's Medications   Discharge Prescriptions    CIPROFLOXACIN (CIPRO) 250 MG TABLET    Take 1 tablet (250 mg total) by mouth 2 (two) times a day for 3 days       Start Date: 3/28/2023 End Date: 3/31/2023       Order Dose: 250 mg       Quantity: 6 tablet    Refills: 0    ONDANSETRON (ZOFRAN-ODT) 4 MG DISINTEGRATING TABLET    Take 1 tablet (4 mg total) by mouth every 6 (six) hours as needed for nausea or vomiting       Start Date: 3/28/2023 End Date: -- Order Dose: 4 mg       Quantity: 20 tablet    Refills: 0       No discharge procedures on file      PDMP Review       Value Time User    PDMP Reviewed  Yes 2/26/2021  3:35 PM Margarita Bell DO          ED Provider  Electronically Signed by           Marcie Jimenez DO  03/28/23 3552

## 2023-03-30 ENCOUNTER — TELEPHONE (OUTPATIENT)
Dept: NEUROSURGERY | Facility: CLINIC | Age: 88
End: 2023-03-30

## 2023-03-30 DIAGNOSIS — I61.9 INTRAPARENCHYMAL HEMORRHAGE OF BRAIN (HCC): Primary | ICD-10-CM

## 2023-03-30 NOTE — TELEPHONE ENCOUNTER
03/30/2023-CALLED [address] [place] [address], TRANSFERRED TO [facility] AT [facility], LEFT MESSAGE ON MACHINE FOR CALL BACK TO SCHEDULED CT BRAIN (SOON THAN LATER) AND HOSP F/U APT (PER DR BRITT  03/10 ADDENDUM)

## 2023-04-05 ENCOUNTER — OFFICE VISIT (OUTPATIENT)
Dept: OBGYN CLINIC | Facility: CLINIC | Age: 88
End: 2023-04-05

## 2023-04-05 VITALS
TEMPERATURE: 97.6 F | DIASTOLIC BLOOD PRESSURE: 60 MMHG | SYSTOLIC BLOOD PRESSURE: 155 MMHG | BODY MASS INDEX: 21.41 KG/M2 | HEART RATE: 79 BPM | WEIGHT: 102 LBS | HEIGHT: 58 IN
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
## 2023-04-05 DIAGNOSIS — S42.292A OTHER CLOSED DISPLACED FRACTURE OF PROXIMAL END OF LEFT HUMERUS, INITIAL ENCOUNTER: Primary | ICD-10-CM

## 2023-04-05 RX ORDER — TIMOLOL MALEATE 5 MG/ML
SOLUTION/ DROPS OPHTHALMIC
COMMUNITY
Start: 2023-02-04

## 2023-04-05 RX ORDER — LATANOPROST 50 UG/ML
SOLUTION/ DROPS OPHTHALMIC
COMMUNITY
Start: 2023-02-04

## 2023-04-05 NOTE — PROGRESS NOTES
1  Other closed displaced fracture of proximal end of left humerus, initial encounter  XR shoulder 2+ vw left    Ambulatory Referral to Occupational Therapy      2  Fall, initial encounter          Orders Placed This Encounter   Procedures   • XR shoulder 2+ vw left   • Ambulatory Referral to Occupational Therapy        Relevant Imaging Studies (I personally reviewed images in PACS and report):    • X-ray left shoulder 4/5/2023: Redemonstrates comminuted/impacted proximal humerus fracture without interval displacement  There is ongoing healing as evidenced by callus formation of the fracture  • X-ray left humerus 3/8/2023: There is an acute left proximal humeral neck fracture  Comminution of the fracture site with displaced fracture fragments  No other significant degenerative changes  IMPRESSION:  • Acute left shoulder pain/injury secondary to fall - comminuted impacted fracture of the proximal humerus/neck; suspected pathologic fracture/osteoporotic  • Radiographic imaging notes ongoing healing of fracture today  Date of Injury: 3/7/41866Jfihxn up interval: 4 weeks, 1 day     Other factors:  • Patient recently hospitalized from 3/8/2023-3/14/2023 in regards to fall as it was complicated by intracranial hemorrhage - monitored with neurosurgery    PLAN:  Repeat X-ray next visit:   LEFT SHOULDER  • Clinical exam and radiographic imaging reviewed with patient today, with impression as per above  I have discussed with the patient the pathophysiology of this diagnosis and reviewed how the examination correlates with this diagnosis  • Repeat imaging of left shoulder obtained today as per above  Other imaging studies reviewed with patient/daughter as well  • Recommended continued conservative treatment as per patient plan    Patient reports seeing occupational medicine currently and I have placed a referral with updated instructions going forward in regards to working on transitioning her out of the sling and "working on active/passive range of motion exercises  Other details as per the referral   • Regards to pain control recommended as needed use of heat/ice therapy 20 minutes on/off, acetaminophen as needed  Recommended against NSAIDs given her medical comorbidities and recent intracranial bleed  Return in about 3 weeks (around 4/26/2023)  Patient Instructions   Proximal Humerus Fracture 4-6 week Recommendations:  no forced range of motion  Continue pendulum exercises  Start wall climbs with forward Flexion and abduction up to 110 degrees  No strength training  Involved extremity used for self-care and feeding  No use affected extremity for housework  No Weight-bearing        Portions of the record may have been created with voice recognition software  Occasional wrong word or \"sound a like\" substitutions may have occurred due to the inherent limitations of voice recognition software  Read the chart carefully and recognize, using context, where substitutions have occurred  CHIEF COMPLAINT:  Chief Complaint   Patient presents with   • Left Arm - Pain, Fracture       HPI:  Ivelisse Fuller is a 82 y o  female  who presents with her daughter for       Visit 4/5/2023 :  Initial evaluation of left shoulder injury/proximal humerus fracture  Precipitating injury on 3/7/2023 after falling in her driveway - she then was found on the floor from an unwitnessed fall on 3/8/2023  Was seen in ER and had several imaging studies done as noted above  Hemorrhagic bleed (monitored through neurosurgery), left proximal humerus fracture noted  Patient reports that she was given a sling in the hospital but it it was not the correct size for as it felt \"too big  \"  She recently purchased a sling that accommodated for her size last week and has been consistently wearing  She states the intensity of pain has improved and is not as frequent    She states when she does have pain is mainly over the anterior lateral aspects of her " shoulder  She states it can intermittently radiate down to her elbow  She describes as an aching pain of mild to moderate intensity  She reports that occupational medicine has been seeing her but that they need a note with direction in regards to how to treat her fracture at this time  Reports that she initially had swelling and bruising of her left shoulder but these have resolved  She denies any N/T of her left upper extremity she feels she has almost complete range of motion of her elbow  Denies prior surgeries of her shoulder in the past   Regards to pain control, she has been using ice  She is only sparingly use acetaminophen if she does not like to take pain medications            Medical, Surgical, Family, and Social History    Past Medical History:   Diagnosis Date   • Breast cancer ([facility] [address] ) 2007   • Cellulitis of left upper arm    • History of anxiety    • History of appendicitis    • History of gastroesophageal reflux (GERD)      Past Surgical History:   Procedure Laterality Date   • APPENDECTOMY      Date Unknown   • CATARACT EXTRACTION Bilateral     Left 2008 // Right 2008   •  SECTION      Date Unknown   • HYSTERECTOMY     • MASTECTOMY Right 2007   • PARTIAL HYSTERECTOMY      fallopian tube removed, ? side   • TUBAL LIGATION      Date Unknown     Social History   Social History     Substance and Sexual Activity   Alcohol Use No     Social History     Substance and Sexual Activity   Drug Use No     Social History     Tobacco Use   Smoking Status Never   Smokeless Tobacco Never     Family History   Problem Relation Age of Onset   • Lung cancer Mother    • Colon cancer Sister    • Hypertension Family    • No Known Problems Father    • No Known Problems Daughter    • No Known Problems Maternal Grandmother    • No Known Problems Maternal Grandfather    • No Known Problems Paternal Grandmother    • No Known Problems Paternal Grandfather    • No Known Problems Sister    • Drug "abuse Neg Hx      Allergies   Allergen Reactions   • Medical Tape    • Other Itching     Adhesive Tape          Physical Exam  /60 (BP Location: Left arm)   Pulse 79   Temp 97 6 °F (36 4 °C) (Temporal)   Ht 4' 10\" (1 473 m)   Wt 46 3 kg (102 lb)   BMI 21 32 kg/m²     Constitutional:  see vital signs  Gen: well-developed, normocephalic/atraumatic, well-groomed  Pulmonary/Chest: Effort normal  No respiratory distress  Ortho Exam  Left shoulder exam:  Patient is using a sling at this time this was removed for examination  On inspection, there is no swelling, erythema, ecchymosis of the shoulder left upper extremity  On palpation, she is tender over the anterior lateral aspects of her shoulder  On range of motion, she is able to demonstrate small pendulum motions of her shoulder without pain  She is able to flex/abduct arm about 30 degrees before experiencing aggravating pain of her general left shoulder    Deferred strength testing of left shoulder d/t fracture    Elbow ROM: -        "

## 2023-04-05 NOTE — PATIENT INSTRUCTIONS
Proximal Humerus Fracture 4-6 week Recommendations:  no forced range of motion  Continue pendulum exercises  Start wall climbs with forward Flexion and abduction up to 110 degrees  No strength training  Involved extremity used for self-care and feeding  No use affected extremity for housework  No Weight-bearing

## 2023-04-07 NOTE — TELEPHONE ENCOUNTER
04/07/2023-CALLED [address] [place] [address], WHO ADVISED PT DISCHARGED TO HOME    CALLED PT AT HOME# AND LEFT MESSAGE ON MACHINE CONFIRMING 04/26/2023 APT IN [place] AND TO SCHEDULE MRI BRAIN PRIOR TO APT     **WAITING FOR MRI BRAIN TO BE SCHEDULED**

## 2023-04-25 ENCOUNTER — OFFICE VISIT (OUTPATIENT)
Dept: OBGYN CLINIC | Facility: CLINIC | Age: 88
End: 2023-04-25

## 2023-04-25 ENCOUNTER — HOSPITAL ENCOUNTER (OUTPATIENT)
Dept: RADIOLOGY | Facility: CLINIC | Age: 88
Discharge: HOME | End: 2023-04-25

## 2023-04-25 VITALS
DIASTOLIC BLOOD PRESSURE: 80 MMHG | HEIGHT: 58 IN | TEMPERATURE: 98.2 F | SYSTOLIC BLOOD PRESSURE: 160 MMHG | WEIGHT: 102 LBS | HEART RATE: 60 BPM | BODY MASS INDEX: 21.41 KG/M2

## 2023-04-25 DIAGNOSIS — S50.02XD CONTUSION OF LEFT ELBOW, SUBSEQUENT ENCOUNTER: ICD-10-CM

## 2023-04-25 DIAGNOSIS — S59.902D ELBOW INJURY, LEFT, SUBSEQUENT ENCOUNTER: ICD-10-CM

## 2023-04-25 DIAGNOSIS — M25.522 PAIN IN LEFT ELBOW: ICD-10-CM

## 2023-04-25 DIAGNOSIS — S42.292A OTHER CLOSED DISPLACED FRACTURE OF PROXIMAL END OF LEFT HUMERUS, INITIAL ENCOUNTER: ICD-10-CM

## 2023-04-25 DIAGNOSIS — S42.292D OTHER CLOSED DISPLACED FRACTURE OF PROXIMAL END OF LEFT HUMERUS WITH ROUTINE HEALING, SUBSEQUENT ENCOUNTER: Primary | ICD-10-CM

## 2023-04-25 NOTE — PROGRESS NOTES
1  Other closed displaced fracture of proximal end of left humerus with routine healing, subsequent encounter  XR shoulder 2+ vw left      2  Pain in left elbow  XR elbow 3+ vw left      3  Elbow injury, left, subsequent encounter  XR elbow 3+ vw left      4  Contusion of left elbow, subsequent encounter          Orders Placed This Encounter   Procedures   • XR shoulder 2+ vw left   • XR elbow 3+ vw left        Relevant Imaging Studies (I personally reviewed images in PACS and report):    • X-ray left shoulder 4/25/2023: Redemonstrates the comminuted/impacted proximal humerus fracture we will with extensive healing/callus formation of the fracture  No interval displacement compared to prior imaging  • X-ray left elbow 4/25/2023: No acute osseous abnormalities  No joint effusion  No significant degenerative changes  • X-ray left shoulder 4/5/2023: Redemonstrates comminuted/impacted proximal humerus fracture without interval displacement  There is ongoing healing as evidenced by callus formation of the fracture  • X-ray left humerus 3/8/2023: There is an acute left proximal humeral neck fracture  Comminution of the fracture site with displaced fracture fragments  No other significant degenerative changes  IMPRESSION:  • Acute left shoulder pain/injury secondary to fall - comminuted impacted fracture of the proximal humerus/neck; suspected pathologic fracture/osteoporotic  • Radiographic imaging notes ongoing/appropriate healing of fracture today  • Acute left lateral/medial elbow pain-patient reports his been sore since original fall as well  Initial radiographic imaging unremarkable  Repeat imaging today is unremarkable  Clinical exam and history consistent with myofascial pain/medial lateral epicondylitis    Date of Injury: 3/7/2023  Follow up interval: 7 weeks     Other factors:  • Patient recently hospitalized from 3/8/2023-3/14/2023 in regards to fall as it was complicated by intracranial hemorrhage "- monitored with neurosurgery    PLAN:    • Clinical exam and radiographic imaging reviewed with patient today, with impression as per above  I have discussed with the patient the pathophysiology of this diagnosis and reviewed how the examination correlates with this diagnosis  • Radiographic imaging obtained of her left shoulder as well as her left elbow today as noted above  I will follow-up official radiology interpretation  • Recommended continue conservative treatment as per patient plan  Patient can start progressive resistive exercises at this time along with other recommendations as per patient instructions  • Reassured about the radiographic imaging of her left elbow that does not show any ongoing evidence of an occult fracture  Counseled that her pain is likely myofascial and that she can continue work with physical therapy to improve strengthening/function of her left upper extremity  • Regards to pain control recommended as needed use of heat/ice therapy 20 minutes on/off, acetaminophen as needed  Recommended against NSAIDs given her medical comorbidities and recent intracranial bleed  Return in about 6 weeks (around 6/6/2023)  Patient Instructions   Proximal Humerus Fracture >6 week Recommendations:  Avoid forced range of motion  Perform active and passive range of motion  Start progressive resistive exercises  Involved extremity used for self-care and feeding  Weight-bearing as tolerated            Portions of the record may have been created with voice recognition software  Occasional wrong word or \"sound a like\" substitutions may have occurred due to the inherent limitations of voice recognition software  Read the chart carefully and recognize, using context, where substitutions have occurred       CHIEF COMPLAINT:  Chief Complaint   Patient presents with   • Left Shoulder - Follow-up   • Left Upper Arm - Follow-up       HPI:  Philip Ascencio is a 82 y o  female  who presents " "with her daughter for     Visit 4/25/2023: Follow-up left shoulder injury/comminuted proximal humerus fracture  Of note, patient had a precipitating injury on 3/7/2023 after falling in her driveway and another subsequent injury when she was found on the floor on 3/8/2023 that was unwitnessed  She was seen in the ER and hospitalized in regards to a hemorrhagic bleed  She was noted to have a left proximal humerus fracture  Patient has been complaining of left elbow pain as well and there is imaging of her left elbow already that was unremarkable  Since her last visit, patient has noticed improvement in regards to her shoulder pain and overall range of motion movement  Reports adherence to avoiding any lifting with her left upper extremity  She denies any shoulder swelling, discoloration  She reports there is crepitus of her shoulder with range of motion movements  She does note that she has been experiencing continued pain of her left elbow over the lateral medial aspects  She describes a sharp/aching pain of moderate intensity  She feels that she has full range of motion of her elbow despite the pain and denies any elbow swelling  Denies pain with motion but aggravated when palpated  Visit 4/5/2023 :  Initial evaluation of left shoulder injury/proximal humerus fracture  Precipitating injury on 3/7/2023 after falling in her driveway - she then was found on the floor from an unwitnessed fall on 3/8/2023  Was seen in ER and had several imaging studies done as noted above  Hemorrhagic bleed (monitored through neurosurgery), left proximal humerus fracture noted  Patient reports that she was given a sling in the hospital but it it was not the correct size for as it felt \"too big  \"  She recently purchased a sling that accommodated for her size last week and has been consistently wearing  She states the intensity of pain has improved and is not as frequent    She states when she does have pain is mainly over " the anterior lateral aspects of her shoulder  She states it can intermittently radiate down to her elbow  She describes as an aching pain of mild to moderate intensity  She reports that occupational medicine has been seeing her but that they need a note with direction in regards to how to treat her fracture at this time  Reports that she initially had swelling and bruising of her left shoulder but these have resolved  She denies any N/T of her left upper extremity she feels she has almost complete range of motion of her elbow  Denies prior surgeries of her shoulder in the past   Regards to pain control, she has been using ice  She is only sparingly use acetaminophen if she does not like to take pain medications            Medical, Surgical, Family, and Social History    Past Medical History:   Diagnosis Date   • Breast cancer ([facility] [address] ) 2007   • Cellulitis of left upper arm    • History of anxiety    • History of appendicitis    • History of gastroesophageal reflux (GERD)      Past Surgical History:   Procedure Laterality Date   • APPENDECTOMY      Date Unknown   • CATARACT EXTRACTION Bilateral     Left 2008 // Right 2008   •  SECTION      Date Unknown   • HYSTERECTOMY     • MASTECTOMY Right 2007   • PARTIAL HYSTERECTOMY      fallopian tube removed, ? side   • TUBAL LIGATION      Date Unknown     Social History   Social History     Substance and Sexual Activity   Alcohol Use No     Social History     Substance and Sexual Activity   Drug Use No     Social History     Tobacco Use   Smoking Status Never   Smokeless Tobacco Never     Family History   Problem Relation Age of Onset   • Lung cancer Mother    • Colon cancer Sister    • Hypertension Family    • No Known Problems Father    • No Known Problems Daughter    • No Known Problems Maternal Grandmother    • No Known Problems Maternal Grandfather    • No Known Problems Paternal Grandmother    • No Known Problems Paternal Grandfather    • No "Known Problems Sister    • Drug abuse Neg Hx      Allergies   Allergen Reactions   • Medical Tape    • Other Itching     Adhesive Tape          Physical Exam  /80 (BP Location: Right arm)   Pulse 60   Temp 98 2 °F (36 8 °C) (Temporal)   Ht 4' 10\" (1 473 m)   Wt 46 3 kg (102 lb)   BMI 21 32 kg/m²     Constitutional:  see vital signs  Gen: well-developed, normocephalic/atraumatic, well-groomed  Pulmonary/Chest: Effort normal  No respiratory distress       Ortho Exam  Shoulder exam:       LEFT    Inspection Erythema None     Swelling None     Increased Warmth None    Rotator cuff ER 3/5     IR 4/5     Abduction 3/5    ROM FF 90     Abduction 80     ER0 30     IRb Left buttock    TTP:  +posterior aspect over supraspinatus/infraspinatus      Left elbow exam:  On inspection, there is no swelling, ecchymosis, erythema  On range of motion she is extension of 0 degrees and flexion 40 degrees  On palpation, she reports pain when palpating over the medial and lateral epicondyles  No laxity with varus/valgus stress testing of the elbow  Tinel's test over the cubital tunnel, negative  Cozen's test: Positive  Resisted wrist flexion: Aggravates medial elbow pain                "

## 2023-04-25 NOTE — PATIENT INSTRUCTIONS
Proximal Humerus Fracture >6 week Recommendations:  Avoid forced range of motion  Perform active and passive range of motion  Start progressive resistive exercises     Involved extremity used for self-care and feeding  Weight-bearing as tolerated

## 2023-05-22 ENCOUNTER — TELEPHONE (OUTPATIENT)
Dept: NEUROSURGERY | Facility: CLINIC | Age: 88
End: 2023-05-22

## 2023-05-22 NOTE — TELEPHONE ENCOUNTER
I called pt to try and schedule cthead we orderd back in march lots of notes under her cthead order - she states today she doesn't want the cthead pt refusing test - I sent message to nurse to advise of refusal _BA

## 2023-06-06 ENCOUNTER — OFFICE VISIT (OUTPATIENT)
Dept: OBGYN CLINIC | Facility: CLINIC | Age: 88
End: 2023-06-06
Payer: COMMERCIAL

## 2023-06-06 VITALS
DIASTOLIC BLOOD PRESSURE: 70 MMHG | BODY MASS INDEX: 21.41 KG/M2 | TEMPERATURE: 98.6 F | SYSTOLIC BLOOD PRESSURE: 145 MMHG | WEIGHT: 102 LBS | HEART RATE: 49 BPM | HEIGHT: 58 IN

## 2023-06-06 DIAGNOSIS — S42.292D OTHER CLOSED DISPLACED FRACTURE OF PROXIMAL END OF LEFT HUMERUS WITH ROUTINE HEALING, SUBSEQUENT ENCOUNTER: Primary | ICD-10-CM

## 2023-06-06 PROCEDURE — 99212 OFFICE O/P EST SF 10 MIN: CPT | Performed by: STUDENT IN AN ORGANIZED HEALTH CARE EDUCATION/TRAINING PROGRAM

## 2023-06-08 NOTE — PROGRESS NOTES
1  Other closed displaced fracture of proximal end of left humerus with routine healing, subsequent encounter          No orders of the defined types were placed in this encounter  Relevant Imaging Studies (I personally reviewed images in PACS and report):    • X-ray left shoulder 4/25/2023: Redemonstrates the comminuted/impacted proximal humerus fracture we will with extensive healing/callus formation of the fracture  No interval displacement compared to prior imaging  • X-ray left elbow 4/25/2023: No acute osseous abnormalities  No joint effusion  No significant degenerative changes  • X-ray left shoulder 4/5/2023: Redemonstrates comminuted/impacted proximal humerus fracture without interval displacement  There is ongoing healing as evidenced by callus formation of the fracture  • X-ray left humerus 3/8/2023: There is an acute left proximal humeral neck fracture  Comminution of the fracture site with displaced fracture fragments  No other significant degenerative changes  IMPRESSION:  • Subacute left shoulder pain/injury secondary to fall - comminuted impacted fracture of the proximal humerus/neck; suspected pathologic fracture/osteoporotic  • Clinically improving since last visit with improved range of motion and function  • Prior radiographic imaging already noted osseous healing  Date of Injury: 3/7/2023      Other factors:  • Patient recently hospitalized from 3/8/2023-3/14/2023 in regards to fall as it was complicated by intracranial hemorrhage - monitored with neurosurgery    PLAN:    • Clinical exam and radiographic imaging reviewed with patient today, with impression as per above  I have discussed with the patient the pathophysiology of this diagnosis and reviewed how the examination correlates with this diagnosis  · Reassured of her progressive improvement since last visit    · I counseled she can discuss with her physical therapist when to transition to a home exercise program  · I "counseled she can lift as tolerated with her left upper extremity  · I deferred repeat imaging today as there was already osseous healing changes occurring on last imaging studies  Return if symptoms worsen or fail to improve  Portions of the record may have been created with voice recognition software  Occasional wrong word or \"sound a like\" substitutions may have occurred due to the inherent limitations of voice recognition software  Read the chart carefully and recognize, using context, where substitutions have occurred  CHIEF COMPLAINT:  Chief Complaint   Patient presents with   • Left Shoulder - Follow-up       HPI:  Gia Garza is a 82 y o  female  who presents with her daughter for     Visit 2023: Follow-up left shoulder injury/comminuted proximal humerus fracture:  Improved since last visit  She states there is some mild discomfort of her shoulder but otherwise she has had progressively improving range of motion  She has been able to return back to using her left arm for activities of daily living  She feels like her strength is slightly limited compared to her contralateral side but is progressively improving  Denies any new injury since last visit  Denies any shoulder swelling, discoloration  Denies any N/T of her left upper extremity  She has been working with her home physical therapy in regards to improving her strength/function          Medical, Surgical, Family, and Social History    Past Medical History:   Diagnosis Date   • Breast cancer ([facility] [address] ) 2007   • Cellulitis of left upper arm    • History of anxiety    • History of appendicitis    • History of gastroesophageal reflux (GERD)      Past Surgical History:   Procedure Laterality Date   • APPENDECTOMY      Date Unknown   • CATARACT EXTRACTION Bilateral     Left 2008 // Right 2008   •  SECTION      Date Unknown   • HYSTERECTOMY     • MASTECTOMY Right 2007   • PARTIAL HYSTERECTOMY      fallopian tube " "removed, ? side   • TUBAL LIGATION      Date Unknown     Social History   Social History     Substance and Sexual Activity   Alcohol Use No     Social History     Substance and Sexual Activity   Drug Use No     Social History     Tobacco Use   Smoking Status Never   Smokeless Tobacco Never     Family History   Problem Relation Age of Onset   • Lung cancer Mother    • Colon cancer Sister    • Hypertension Family    • No Known Problems Father    • No Known Problems Daughter    • No Known Problems Maternal Grandmother    • No Known Problems Maternal Grandfather    • No Known Problems Paternal Grandmother    • No Known Problems Paternal Grandfather    • No Known Problems Sister    • Drug abuse Neg Hx      Allergies   Allergen Reactions   • Medical Tape    • Other Itching     Adhesive Tape          Physical Exam  /70 (BP Location: Right arm)   Pulse (!) 49   Temp 98 6 °F (37 °C) (Temporal)   Ht 4' 10\" (1 473 m)   Wt 46 3 kg (102 lb)   BMI 21 32 kg/m²     Constitutional:  see vital signs  Gen: well-developed, normocephalic/atraumatic, well-groomed  Pulmonary/Chest: Effort normal  No respiratory distress       Ortho Exam  Shoulder exam:       LEFT    Inspection Erythema None     Swelling None     Increased Warmth None    Rotator cuff ER 5/5     IR 5-/5     Abduction 4+/5    ROM      Abduction 90     ER0 30     IRb Left buttock    TTP:  Denies                 "

## 2023-07-19 ENCOUNTER — HOSPITAL ENCOUNTER (OUTPATIENT)
Dept: MAMMOGRAPHY | Facility: HOSPITAL | Age: 88
Discharge: HOME | End: 2023-07-19
Payer: COMMERCIAL

## 2023-07-19 VITALS — BODY MASS INDEX: 21.41 KG/M2 | HEIGHT: 58 IN | WEIGHT: 102 LBS

## 2023-07-19 DIAGNOSIS — Z12.31 OTHER SCREENING MAMMOGRAM: ICD-10-CM

## 2023-07-19 PROCEDURE — 77063 BREAST TOMOSYNTHESIS BI: CPT

## 2023-07-19 PROCEDURE — 77067 SCR MAMMO BI INCL CAD: CPT

## 2023-09-20 ENCOUNTER — TELEPHONE (OUTPATIENT)
Dept: FAMILY MEDICINE CLINIC | Facility: CLINIC | Age: 88
End: 2023-09-20

## 2023-09-20 NOTE — TELEPHONE ENCOUNTER
Lft vm to Dr Moni Hernandez office. Per Dr Falk Necessary request to remove her from patients chart, she is no longer pts pcp.

## 2023-11-29 ENCOUNTER — HOSPITAL ENCOUNTER (EMERGENCY)
Facility: HOSPITAL | Age: 88
Discharge: HOME | End: 2023-11-29
Attending: EMERGENCY MEDICINE
Payer: COMMERCIAL

## 2023-11-29 ENCOUNTER — APPOINTMENT (EMERGENCY)
Dept: CT IMAGING | Facility: HOSPITAL | Age: 88
End: 2023-11-29
Payer: COMMERCIAL

## 2023-11-29 VITALS
TEMPERATURE: 97.9 F | OXYGEN SATURATION: 98 % | RESPIRATION RATE: 16 BRPM | BODY MASS INDEX: 21.83 KG/M2 | HEART RATE: 68 BPM | DIASTOLIC BLOOD PRESSURE: 74 MMHG | WEIGHT: 104 LBS | HEIGHT: 58 IN | SYSTOLIC BLOOD PRESSURE: 178 MMHG

## 2023-11-29 DIAGNOSIS — G45.9 TIA (TRANSIENT ISCHEMIC ATTACK): Primary | ICD-10-CM

## 2023-11-29 DIAGNOSIS — I10 HIGH BLOOD PRESSURE: ICD-10-CM

## 2023-11-29 LAB
ANION GAP SERPL CALCULATED.3IONS-SCNC: 11 MMOL/L
APTT PPP: 27 SECONDS (ref 23–37)
BASOPHILS # BLD AUTO: 0.04 THOUSANDS/ÂΜL (ref 0–0.1)
BASOPHILS NFR BLD AUTO: 1 % (ref 0–1)
BILIRUB UR QL STRIP: NEGATIVE
BUN SERPL-MCNC: 25 MG/DL (ref 5–25)
CALCIUM SERPL-MCNC: 9.6 MG/DL (ref 8.4–10.2)
CHLORIDE SERPL-SCNC: 105 MMOL/L (ref 96–108)
CLARITY UR: CLEAR
CO2 SERPL-SCNC: 22 MMOL/L (ref 21–32)
COLOR UR: YELLOW
CREAT SERPL-MCNC: 1.01 MG/DL (ref 0.6–1.3)
EOSINOPHIL # BLD AUTO: 0.15 THOUSAND/ÂΜL (ref 0–0.61)
EOSINOPHIL NFR BLD AUTO: 2 % (ref 0–6)
ERYTHROCYTE [DISTWIDTH] IN BLOOD BY AUTOMATED COUNT: 12.4 % (ref 11.6–15.1)
GFR SERPL CREATININE-BSD FRML MDRD: 50 ML/MIN/1.73SQ M
GLUCOSE SERPL-MCNC: 100 MG/DL (ref 65–140)
GLUCOSE SERPL-MCNC: 72 MG/DL (ref 65–140)
GLUCOSE UR STRIP-MCNC: NEGATIVE MG/DL
HCT VFR BLD AUTO: 39.9 % (ref 34.8–46.1)
HGB BLD-MCNC: 12.7 G/DL (ref 11.5–15.4)
HGB UR QL STRIP.AUTO: NEGATIVE
IMM GRANULOCYTES # BLD AUTO: 0.02 THOUSAND/UL (ref 0–0.2)
IMM GRANULOCYTES NFR BLD AUTO: 0 % (ref 0–2)
INR PPP: 0.97 (ref 0.84–1.19)
KETONES UR STRIP-MCNC: NEGATIVE MG/DL
LEUKOCYTE ESTERASE UR QL STRIP: NEGATIVE
LYMPHOCYTES # BLD AUTO: 1.13 THOUSANDS/ÂΜL (ref 0.6–4.47)
LYMPHOCYTES NFR BLD AUTO: 18 % (ref 14–44)
MCH RBC QN AUTO: 31 PG (ref 26.8–34.3)
MCHC RBC AUTO-ENTMCNC: 31.8 G/DL (ref 31.4–37.4)
MCV RBC AUTO: 97 FL (ref 82–98)
MONOCYTES # BLD AUTO: 0.71 THOUSAND/ÂΜL (ref 0.17–1.22)
MONOCYTES NFR BLD AUTO: 11 % (ref 4–12)
NEUTROPHILS # BLD AUTO: 4.18 THOUSANDS/ÂΜL (ref 1.85–7.62)
NEUTS SEG NFR BLD AUTO: 68 % (ref 43–75)
NITRITE UR QL STRIP: NEGATIVE
NRBC BLD AUTO-RTO: 0 /100 WBCS
PH UR STRIP.AUTO: 7 [PH]
PLATELET # BLD AUTO: 257 THOUSANDS/UL (ref 149–390)
PMV BLD AUTO: 10.4 FL (ref 8.9–12.7)
POTASSIUM SERPL-SCNC: 4.8 MMOL/L (ref 3.5–5.3)
PROT UR STRIP-MCNC: NEGATIVE MG/DL
PROTHROMBIN TIME: 12.8 SECONDS (ref 11.6–14.5)
RBC # BLD AUTO: 4.1 MILLION/UL (ref 3.81–5.12)
SODIUM SERPL-SCNC: 138 MMOL/L (ref 135–147)
SP GR UR STRIP.AUTO: 1.01 (ref 1–1.03)
UROBILINOGEN UR QL STRIP.AUTO: 0.2 E.U./DL
WBC # BLD AUTO: 6.23 THOUSAND/UL (ref 4.31–10.16)

## 2023-11-29 PROCEDURE — 82948 REAGENT STRIP/BLOOD GLUCOSE: CPT

## 2023-11-29 PROCEDURE — 80048 BASIC METABOLIC PNL TOTAL CA: CPT | Performed by: EMERGENCY MEDICINE

## 2023-11-29 PROCEDURE — G1004 CDSM NDSC: HCPCS

## 2023-11-29 PROCEDURE — 70496 CT ANGIOGRAPHY HEAD: CPT

## 2023-11-29 PROCEDURE — 85610 PROTHROMBIN TIME: CPT | Performed by: EMERGENCY MEDICINE

## 2023-11-29 PROCEDURE — 70498 CT ANGIOGRAPHY NECK: CPT

## 2023-11-29 PROCEDURE — 36415 COLL VENOUS BLD VENIPUNCTURE: CPT | Performed by: EMERGENCY MEDICINE

## 2023-11-29 PROCEDURE — 81003 URINALYSIS AUTO W/O SCOPE: CPT | Performed by: EMERGENCY MEDICINE

## 2023-11-29 PROCEDURE — 85730 THROMBOPLASTIN TIME PARTIAL: CPT | Performed by: EMERGENCY MEDICINE

## 2023-11-29 PROCEDURE — 99285 EMERGENCY DEPT VISIT HI MDM: CPT | Performed by: EMERGENCY MEDICINE

## 2023-11-29 PROCEDURE — 85025 COMPLETE CBC W/AUTO DIFF WBC: CPT | Performed by: EMERGENCY MEDICINE

## 2023-11-29 PROCEDURE — 99285 EMERGENCY DEPT VISIT HI MDM: CPT

## 2023-11-29 RX ADMIN — IOHEXOL 85 ML: 350 INJECTION, SOLUTION INTRAVENOUS at 16:24

## 2023-11-29 NOTE — ED PROVIDER NOTES
Final Diagnosis:  1. TIA (transient ischemic attack)    2. High blood pressure        Chief Complaint   Patient presents with    Medical Problem     Pt was with daughter in the morning and pt states everything was okay- daughter left and pt was trying to talk to the dog and states she couldn't get her words out around 1300 today, denies any slurred speech or weakness      HPI  Patient presents for evaluation of episode of aphasia. Patient and daughter at bedside provide history. They state that this morning the daughter was interacting with the mother and the mother was acting normally. Apparently then after the daughter left the patient was attempting to talk to the family pet was unable to get any of her words out. She states this lasted approximately an hour maybe longer. Daughter states that when she then got to the home and was attempting to interact with the patient she felt like she was not understanding what she was saying either. Patient states that she had a similar instance this sometime around March. Daughter says that they were transferred to the trauma center at that time. Review of records show that they were transferred after a fall. She did have an intraparenchymal hemorrhage. There were no signs of ischemia on MRI at that time. Patient states she feels at her baseline with no current issues. Unless otherwise specified:  - No language barrier.   - History obtained from patient. - There are no limitations to the history obtained. - Previous charting was reviewed    PMH:   has a past medical history of Breast cancer ([address]) (2007), Cellulitis of left upper arm, History of anxiety, History of appendicitis, and History of gastroesophageal reflux (GERD). PSH:   has a past surgical history that includes  section; Appendectomy; Partial hysterectomy; Tubal ligation; Cataract extraction (Bilateral); Hysterectomy; and Mastectomy (Right, 2007).        ROS:  Review of Systems   - 13 point ROS was performed and all are normal unless stated in the history above. - Nursing note reviewed. Vitals reviewed. - Orders placed by myself and/or advanced practitioner / resident. PE:   Vitals:    11/29/23 1730 11/29/23 1800 11/29/23 1830 11/29/23 1835   BP: (!) 189/77 (!) 178/74     BP Location: Right arm Right arm     Pulse: 60 66 67 68   Resp: 14 18  16   Temp:    97.9 °F (36.6 °C)   TempSrc: Tympanic   Tympanic   SpO2: 96% 95% 97% 98%   Weight:       Height:         Vitals reviewed by me. GCS 15. Sensation grossly intact. No cranial nerve deficits noted. 5/5 strength bilateral upper and lower extremities. Finger-to-nose good. Heel to shin good. No pronator drift noted. Was able to ambulate without difficulty. Unless otherwise specified above:    General: VS reviewed  Appears in NAD    Head: Normocephalic, atraumatic  Eyes: EOM-I.     ENT: Atraumatic external nose and ears. No drooling. Neck: No JVD. CV: No pallor noted  Lungs:   No tachypnea  No respiratory distress    Abdomen:  Soft, non-tender, non-distended    MSK:   No obvious deformity    Skin: Dry, intact. No obvious rash. Psychiatric/Behavioral: Appropriate mood and affect   Exam: deferred    Physical Exam     Procedures   A:  - Nursing note reviewed. ED Course as of 11/29/23 1915 Wed Nov 29, 2023   1646 CTA head and neck with and without contrast  There appears to be some ischemic changes to the left frontal area. On review of prior CT imaging this appears to be consistent with the area where she had an intraparenchymal bleed. I do not appreciate any other significant findings. Will await official radiology report.    1702 CTA head and neck with and without contrast  Will review with neurology     CTA head and neck with and without contrast   Final Result      CT brain:  Resolving left parafalcine, medial frontal lobe intraparenchymal hemorrhage with no suspicious associated vascular malformation      CTA head: Negative for large vessel intracranial occlusion or hemodynamically significant stenosis. CTA neck:  No extracranial carotid stenosis. The cervical vertebral arteries are patent.          Workstation performed: [id number]         MRI brain w wo contrast    (Results Pending)     Orders Placed This Encounter   Procedures    CTA head and neck with and without contrast    MRI brain w wo contrast    CBC and differential    Protime-INR    APTT    Basic metabolic panel    UA w Reflex to Microscopic w Reflex to Culture    Insert peripheral IV     Labs Reviewed   PROTIME-INR - Normal       Result Value Ref Range Status    Protime 12.8  11.6 - 14.5 seconds Final    INR 0.97  0.84 - 1.19 Final   APTT - Normal    PTT 27  23 - 37 seconds Final    Comment: Therapeutic Heparin Range =  60-90 seconds   POCT GLUCOSE - Normal    POC Glucose 72  65 - 140 mg/dl Final   CBC AND DIFFERENTIAL    WBC 6.23  4.31 - 10.16 Thousand/uL Final    RBC 4.10  3.81 - 5.12 Million/uL Final    Hemoglobin 12.7  11.5 - 15.4 g/dL Final    Hematocrit 39.9  34.8 - 46.1 % Final    MCV 97  82 - 98 fL Final    MCH 31.0  26.8 - 34.3 pg Final    MCHC 31.8  31.4 - 37.4 g/dL Final    RDW 12.4  11.6 - 15.1 % Final    MPV 10.4  8.9 - 12.7 fL Final    Platelets 105  056 - 390 Thousands/uL Final    nRBC 0  /100 WBCs Final    Neutrophils Relative 68  43 - 75 % Final    Immat GRANS % 0  0 - 2 % Final    Lymphocytes Relative 18  14 - 44 % Final    Monocytes Relative 11  4 - 12 % Final    Eosinophils Relative 2  0 - 6 % Final    Basophils Relative 1  0 - 1 % Final    Neutrophils Absolute 4.18  1.85 - 7.62 Thousands/µL Final    Immature Grans Absolute 0.02  0.00 - 0.20 Thousand/uL Final    Lymphocytes Absolute 1.13  0.60 - 4.47 Thousands/µL Final    Monocytes Absolute 0.71  0.17 - 1.22 Thousand/µL Final    Eosinophils Absolute 0.15  0.00 - 0.61 Thousand/µL Final    Basophils Absolute 0.04  0.00 - 0.10 Thousands/µL Final   BASIC METABOLIC PANEL    Sodium 319  135 - 147 mmol/L Final    Potassium 4.8  3.5 - 5.3 mmol/L Final    Chloride 105  96 - 108 mmol/L Final    CO2 22  21 - 32 mmol/L Final    ANION GAP 11  mmol/L Final    BUN 25  5 - 25 mg/dL Final    Creatinine 1.01  0.60 - 1.30 mg/dL Final    Comment: Standardized to IDMS reference method    Glucose 100  65 - 140 mg/dL Final    Comment: If the patient is fasting, the ADA then defines impaired fasting glucose as > 100 mg/dL and diabetes as > or equal to 123 mg/dL. Calcium 9.6  8.4 - 10.2 mg/dL Final    eGFR 50  ml/min/1.73sq m Final    Narrative:     [facility] guidelines for Chronic Kidney Disease (CKD):     Stage 1 with normal or high GFR (GFR > 90 mL/min/1.73 square meters)    Stage 2 Mild CKD (GFR = 60-89 mL/min/1.73 square meters)    Stage 3A Moderate CKD (GFR = 45-59 mL/min/1.73 square meters)    Stage 3B Moderate CKD (GFR = 30-44 mL/min/1.73 square meters)    Stage 4 Severe CKD (GFR = 15-29 mL/min/1.73 square meters)    Stage 5 End Stage CKD (GFR <15 mL/min/1.73 square meters)  Note: GFR calculation is accurate only with a steady state creatinine   UA W REFLEX TO MICROSCOPIC WITH REFLEX TO CULTURE    Color, UA Yellow   Final    Clarity, UA Clear   Final    Specific Gravity, UA 1.015  1.003 - 1.030 Final    pH, UA 7.0  4.5, 5.0, 5.5, 6.0, 6.5, 7.0, 7.5, 8.0 Final    Leukocytes, UA Negative  Negative Final    Nitrite, UA Negative  Negative Final    Protein, UA Negative  Negative mg/dl Final    Glucose, UA Negative  Negative mg/dl Final    Ketones, UA Negative  Negative mg/dl Final    Urobilinogen, UA 0.2  0.2, 1.0 E.U./dl E.U./dl Final    Bilirubin, UA Negative  Negative Final    Occult Blood, UA Negative  Negative Final         Final Diagnosis:  1. TIA (transient ischemic attack)    2. High blood pressure        P:  -Patient presents with story concerning for possible TIA.   Patient is not a candidate for thrombolytic therapy given resolution of symptoms, head bleed. Will provide CTA to evaluate for obvious signs of ischemia. Daughter asked about possible UTI. Will also check for signs of infection.  - CTA without acute findings. I reviewed the case with neurology who suggested admission for MRI with and without contrast, EEG, and blood pressure management. I went back and discussed this with the patient and daughter who is bedside. Patient does not want to stay in the hospital.  She does not want to have an closed MRI. I discussed the risk with her and daughter at bedside. Overall I think this is a reasonable decision that she would like to defer time spent in the hospital.  Ordered outpatient MRI. Provided information to follow-up with neurology. Unless otherwise noted the patient's medications were reviewed and they should continue as directed. Medications   iohexol (OMNIPAQUE) 350 MG/ML injection (MULTI-DOSE) 85 mL (85 mL Intravenous Given 11/29/23 1624)     Time reflects when diagnosis was documented in both MDM as applicable and the Disposition within this note       Time User Action Codes Description Comment    11/29/2023  6:21 PM Zuri Cortez Add [G45.9] TIA (transient ischemic attack)     11/29/2023  6:21 PM Zuri Cortez Add [I10] High blood pressure           ED Disposition       ED Disposition   Discharge    Condition   Stable    Date/Time   Wed Nov 29, 2023  6:21 PM    Comment   Anthony Enrique discharge to home/self care.                    Follow-up Information       Follow up With Specialties Details Why Contact Info Additional Information    Anny Hilton DO Family Medicine   [address] [place] [id number]  [phone]       [facility] Schedule an appointment as soon as possible for a visit   [address] [phone] [address] [facility], [facility] [address] [place] [address], [facility], [place], [address]    [phone]          Discharge Medication List as of 11/29/2023  6:33 PM        CONTINUE these medications which have NOT CHANGED    Details   acetaminophen (TYLENOL) 325 mg tablet Take 3 tablets (975 mg total) by mouth every 8 (eight) hours, Starting Tue 3/14/2023, No Print      citalopram (CeleXA) 20 mg tablet Take 20 mg by mouth daily, Historical Med      Docusate Sodium (COLACE PO) Take by mouth, Historical Med      latanoprost (XALATAN) 0.005 % ophthalmic solution Starting Sat 2/4/2023, Historical Med      levETIRAcetam (KEPPRA) 500 mg tablet Take 1 tablet (500 mg total) by mouth every 12 (twelve) hours for 4 doses, Starting Tue 3/14/2023, Until Tue 6/6/2023, No Print      timolol (TIMOPTIC) 0.5 % ophthalmic solution Starting Sat 2/4/2023, Historical Med           Outpatient Discharge Orders   MRI brain w wo contrast   Standing Status: Future Standing Exp. Date: 11/29/27     Prior to Admission Medications   Prescriptions Last Dose Informant Patient Reported? Taking? Docusate Sodium (COLACE PO) Not Taking  Yes No   Sig: Take by mouth   Patient not taking: Reported on 11/29/2023   acetaminophen (TYLENOL) 325 mg tablet Past Week  No Yes   Sig: Take 3 tablets (975 mg total) by mouth every 8 (eight) hours   citalopram (CeleXA) 20 mg tablet Not Taking  Yes No   Sig: Take 20 mg by mouth daily   Patient not taking: Reported on 4/25/2023   latanoprost (XALATAN) 0.005 % ophthalmic solution Not Taking  Yes No   Patient not taking: Reported on 11/29/2023   levETIRAcetam (KEPPRA) 500 mg tablet   No No   Sig: Take 1 tablet (500 mg total) by mouth every 12 (twelve) hours for 4 doses   Patient not taking: Reported on 6/6/2023   timolol (TIMOPTIC) 0.5 % ophthalmic solution Unknown  Yes No      Facility-Administered Medications: None       Portions of the record may have been created with voice recognition software.  Occasional wrong word or "sound a like" substitutions may have occurred due to the inherent limitations of voice recognition software. Read the chart carefully and recognize, using context, where substitutions have occurred.     Electronically signed by:  MD Gerson Dumont MD  11/29/23 1593

## 2023-11-29 NOTE — ED NOTES
Pt   remains alert and oriented x3 ,ambulates   with steady gait. Deneis any dizziness or headache. Pt    daughter at bedside.      Parker Aldana RN  11/29/23 [id number]

## 2023-12-01 ENCOUNTER — APPOINTMENT (OUTPATIENT)
Dept: LAB | Facility: CLINIC | Age: 88
End: 2023-12-01
Payer: COMMERCIAL

## 2023-12-01 DIAGNOSIS — G45.9 INTERMITTENT CEREBRAL ISCHEMIA: ICD-10-CM

## 2023-12-01 LAB
ALBUMIN SERPL BCP-MCNC: 4.1 G/DL (ref 3.5–5)
ALP SERPL-CCNC: 121 U/L (ref 34–104)
ALT SERPL W P-5'-P-CCNC: 9 U/L (ref 7–52)
ANION GAP SERPL CALCULATED.3IONS-SCNC: 10 MMOL/L
AST SERPL W P-5'-P-CCNC: 16 U/L (ref 13–39)
BILIRUB SERPL-MCNC: 0.57 MG/DL (ref 0.2–1)
BUN SERPL-MCNC: 18 MG/DL (ref 5–25)
CALCIUM SERPL-MCNC: 9.4 MG/DL (ref 8.4–10.2)
CHLORIDE SERPL-SCNC: 102 MMOL/L (ref 96–108)
CHOLEST SERPL-MCNC: 213 MG/DL
CO2 SERPL-SCNC: 27 MMOL/L (ref 21–32)
CREAT SERPL-MCNC: 1.13 MG/DL (ref 0.6–1.3)
GFR SERPL CREATININE-BSD FRML MDRD: 44 ML/MIN/1.73SQ M
GLUCOSE P FAST SERPL-MCNC: 97 MG/DL (ref 65–99)
HDLC SERPL-MCNC: 60 MG/DL
LDLC SERPL CALC-MCNC: 129 MG/DL (ref 0–100)
NONHDLC SERPL-MCNC: 153 MG/DL
POTASSIUM SERPL-SCNC: 4.3 MMOL/L (ref 3.5–5.3)
PROT SERPL-MCNC: 7.2 G/DL (ref 6.4–8.4)
SODIUM SERPL-SCNC: 139 MMOL/L (ref 135–147)
TRIGL SERPL-MCNC: 118 MG/DL

## 2023-12-01 PROCEDURE — 80053 COMPREHEN METABOLIC PANEL: CPT

## 2023-12-01 PROCEDURE — 80061 LIPID PANEL: CPT

## 2023-12-01 PROCEDURE — 36415 COLL VENOUS BLD VENIPUNCTURE: CPT

## 2024-02-21 PROBLEM — Z00.00 MEDICARE ANNUAL WELLNESS VISIT, SUBSEQUENT: Status: RESOLVED | Noted: 2018-05-01 | Resolved: 2024-02-21

## 2024-03-07 ENCOUNTER — APPOINTMENT (OUTPATIENT)
Dept: LAB | Facility: CLINIC | Age: 89
End: 2024-03-07
Payer: COMMERCIAL

## 2024-03-07 DIAGNOSIS — Z13.220 SCREENING FOR LIPOID DISORDERS: ICD-10-CM

## 2024-03-07 DIAGNOSIS — I10 ESSENTIAL HYPERTENSION, MALIGNANT: ICD-10-CM

## 2024-03-07 LAB
ALBUMIN SERPL BCP-MCNC: 4.1 G/DL (ref 3.5–5)
ALP SERPL-CCNC: 120 U/L (ref 34–104)
ALT SERPL W P-5'-P-CCNC: 11 U/L (ref 7–52)
ANION GAP SERPL CALCULATED.3IONS-SCNC: 6 MMOL/L
AST SERPL W P-5'-P-CCNC: 16 U/L (ref 13–39)
BILIRUB SERPL-MCNC: 0.48 MG/DL (ref 0.2–1)
BUN SERPL-MCNC: 21 MG/DL (ref 5–25)
CALCIUM SERPL-MCNC: 9.7 MG/DL (ref 8.4–10.2)
CHLORIDE SERPL-SCNC: 103 MMOL/L (ref 96–108)
CHOLEST SERPL-MCNC: 175 MG/DL
CO2 SERPL-SCNC: 29 MMOL/L (ref 21–32)
CREAT SERPL-MCNC: 0.9 MG/DL (ref 0.6–1.3)
ERYTHROCYTE [DISTWIDTH] IN BLOOD BY AUTOMATED COUNT: 12.2 % (ref 11.6–15.1)
GFR SERPL CREATININE-BSD FRML MDRD: 58 ML/MIN/1.73SQ M
GLUCOSE P FAST SERPL-MCNC: 92 MG/DL (ref 65–99)
HCT VFR BLD AUTO: 38.2 % (ref 34.8–46.1)
HDLC SERPL-MCNC: 60 MG/DL
HGB BLD-MCNC: 12 G/DL (ref 11.5–15.4)
LDLC SERPL CALC-MCNC: 95 MG/DL (ref 0–100)
MCH RBC QN AUTO: 31 PG (ref 26.8–34.3)
MCHC RBC AUTO-ENTMCNC: 31.4 G/DL (ref 31.4–37.4)
MCV RBC AUTO: 99 FL (ref 82–98)
NONHDLC SERPL-MCNC: 115 MG/DL
PLATELET # BLD AUTO: 243 THOUSANDS/UL (ref 149–390)
PMV BLD AUTO: 10.9 FL (ref 8.9–12.7)
POTASSIUM SERPL-SCNC: 4.7 MMOL/L (ref 3.5–5.3)
PROT SERPL-MCNC: 7.1 G/DL (ref 6.4–8.4)
RBC # BLD AUTO: 3.87 MILLION/UL (ref 3.81–5.12)
SODIUM SERPL-SCNC: 138 MMOL/L (ref 135–147)
TRIGL SERPL-MCNC: 99 MG/DL
WBC # BLD AUTO: 4.73 THOUSAND/UL (ref 4.31–10.16)

## 2024-03-07 PROCEDURE — 80061 LIPID PANEL: CPT

## 2024-03-07 PROCEDURE — 80053 COMPREHEN METABOLIC PANEL: CPT

## 2024-03-07 PROCEDURE — 85027 COMPLETE CBC AUTOMATED: CPT

## 2024-03-07 PROCEDURE — 36415 COLL VENOUS BLD VENIPUNCTURE: CPT

## 2024-06-06 ENCOUNTER — APPOINTMENT (OUTPATIENT)
Dept: LAB | Facility: CLINIC | Age: 89
End: 2024-06-06
Payer: COMMERCIAL

## 2024-06-06 ENCOUNTER — TRANSCRIBE ORDERS (OUTPATIENT)
Dept: LAB | Facility: CLINIC | Age: 89
End: 2024-06-06

## 2024-06-06 DIAGNOSIS — E78.5 HYPERLIPIDEMIA, UNSPECIFIED HYPERLIPIDEMIA TYPE: ICD-10-CM

## 2024-06-06 DIAGNOSIS — I10 HYPERTENSION, UNSPECIFIED TYPE: ICD-10-CM

## 2024-06-06 DIAGNOSIS — E55.9 VITAMIN D DEFICIENCY: ICD-10-CM

## 2024-06-06 DIAGNOSIS — G45.9 INTERMITTENT CEREBRAL ISCHEMIA: Primary | ICD-10-CM

## 2024-06-06 DIAGNOSIS — I10 HYPERTENSION, UNSPECIFIED TYPE: Primary | ICD-10-CM

## 2024-06-06 LAB
25(OH)D3 SERPL-MCNC: 49.7 NG/ML (ref 30–100)
ERYTHROCYTE [DISTWIDTH] IN BLOOD BY AUTOMATED COUNT: 12.2 % (ref 11.6–15.1)
HCT VFR BLD AUTO: 38.8 % (ref 34.8–46.1)
HGB BLD-MCNC: 12.3 G/DL (ref 11.5–15.4)
MCH RBC QN AUTO: 31.2 PG (ref 26.8–34.3)
MCHC RBC AUTO-ENTMCNC: 31.7 G/DL (ref 31.4–37.4)
MCV RBC AUTO: 99 FL (ref 82–98)
PLATELET # BLD AUTO: 242 THOUSANDS/UL (ref 149–390)
PMV BLD AUTO: 10.9 FL (ref 8.9–12.7)
RBC # BLD AUTO: 3.94 MILLION/UL (ref 3.81–5.12)
WBC # BLD AUTO: 4.9 THOUSAND/UL (ref 4.31–10.16)

## 2024-06-06 PROCEDURE — 82306 VITAMIN D 25 HYDROXY: CPT

## 2024-06-06 PROCEDURE — 80061 LIPID PANEL: CPT

## 2024-06-06 PROCEDURE — 80053 COMPREHEN METABOLIC PANEL: CPT

## 2024-06-06 PROCEDURE — 36415 COLL VENOUS BLD VENIPUNCTURE: CPT

## 2024-06-06 PROCEDURE — 85027 COMPLETE CBC AUTOMATED: CPT

## 2024-06-07 LAB
ALBUMIN SERPL BCP-MCNC: 4 G/DL (ref 3.5–5)
ALP SERPL-CCNC: 114 U/L (ref 34–104)
ALT SERPL W P-5'-P-CCNC: 11 U/L (ref 7–52)
ANION GAP SERPL CALCULATED.3IONS-SCNC: 8 MMOL/L (ref 4–13)
AST SERPL W P-5'-P-CCNC: 15 U/L (ref 13–39)
BILIRUB SERPL-MCNC: 0.57 MG/DL (ref 0.2–1)
BUN SERPL-MCNC: 21 MG/DL (ref 5–25)
CALCIUM SERPL-MCNC: 9.3 MG/DL (ref 8.4–10.2)
CHLORIDE SERPL-SCNC: 101 MMOL/L (ref 96–108)
CHOLEST SERPL-MCNC: 152 MG/DL
CO2 SERPL-SCNC: 28 MMOL/L (ref 21–32)
CREAT SERPL-MCNC: 1.02 MG/DL (ref 0.6–1.3)
GFR SERPL CREATININE-BSD FRML MDRD: 49 ML/MIN/1.73SQ M
GLUCOSE P FAST SERPL-MCNC: 76 MG/DL (ref 65–99)
HDLC SERPL-MCNC: 51 MG/DL
LDLC SERPL CALC-MCNC: 80 MG/DL (ref 0–100)
NONHDLC SERPL-MCNC: 101 MG/DL
POTASSIUM SERPL-SCNC: 4.9 MMOL/L (ref 3.5–5.3)
PROT SERPL-MCNC: 6.8 G/DL (ref 6.4–8.4)
SODIUM SERPL-SCNC: 137 MMOL/L (ref 135–147)
TRIGL SERPL-MCNC: 106 MG/DL

## 2024-07-22 ENCOUNTER — HOSPITAL ENCOUNTER (OUTPATIENT)
Dept: MAMMOGRAPHY | Facility: HOSPITAL | Age: 89
Discharge: HOME | End: 2024-07-22
Payer: COMMERCIAL

## 2024-07-22 VITALS — HEIGHT: 58 IN | WEIGHT: 104 LBS | BODY MASS INDEX: 21.83 KG/M2

## 2024-07-22 DIAGNOSIS — Z12.31 OTHER SCREENING MAMMOGRAM: ICD-10-CM

## 2024-07-22 DIAGNOSIS — C50.911 MALIGNANT NEOPLASM OF RIGHT FEMALE BREAST, UNSPECIFIED ESTROGEN RECEPTOR STATUS, UNSPECIFIED SITE OF BREAST (HCC): ICD-10-CM

## 2024-07-22 PROCEDURE — 77063 BREAST TOMOSYNTHESIS BI: CPT

## 2024-07-22 PROCEDURE — 77067 SCR MAMMO BI INCL CAD: CPT

## 2025-05-06 ENCOUNTER — APPOINTMENT (OUTPATIENT)
Dept: LAB | Facility: CLINIC | Age: 89
End: 2025-05-06
Payer: COMMERCIAL

## 2025-05-06 DIAGNOSIS — Z13.9 SCREENING FOR UNSPECIFIED CONDITION: ICD-10-CM

## 2025-05-06 DIAGNOSIS — I10 HYPERTENSION, UNSPECIFIED TYPE: ICD-10-CM

## 2025-05-06 DIAGNOSIS — Z13.220 SCREENING FOR LIPOID DISORDERS: ICD-10-CM

## 2025-05-06 LAB
25(OH)D3 SERPL-MCNC: 39.1 NG/ML (ref 30–100)
ALBUMIN SERPL BCG-MCNC: 4.1 G/DL (ref 3.5–5)
ALP SERPL-CCNC: 130 U/L (ref 34–104)
ALT SERPL W P-5'-P-CCNC: 13 U/L (ref 7–52)
ANION GAP SERPL CALCULATED.3IONS-SCNC: 8 MMOL/L (ref 4–13)
AST SERPL W P-5'-P-CCNC: 15 U/L (ref 13–39)
BILIRUB SERPL-MCNC: 0.57 MG/DL (ref 0.2–1)
BUN SERPL-MCNC: 20 MG/DL (ref 5–25)
CALCIUM SERPL-MCNC: 9.4 MG/DL (ref 8.4–10.2)
CHLORIDE SERPL-SCNC: 103 MMOL/L (ref 96–108)
CHOLEST SERPL-MCNC: 158 MG/DL (ref ?–200)
CO2 SERPL-SCNC: 28 MMOL/L (ref 21–32)
CREAT SERPL-MCNC: 0.93 MG/DL (ref 0.6–1.3)
ERYTHROCYTE [DISTWIDTH] IN BLOOD BY AUTOMATED COUNT: 12.3 % (ref 11.6–15.1)
GFR SERPL CREATININE-BSD FRML MDRD: 55 ML/MIN/1.73SQ M
GLUCOSE P FAST SERPL-MCNC: 101 MG/DL (ref 65–99)
HCT VFR BLD AUTO: 39.3 % (ref 34.8–46.1)
HDLC SERPL-MCNC: 57 MG/DL
HGB BLD-MCNC: 12.6 G/DL (ref 11.5–15.4)
LDLC SERPL CALC-MCNC: 80 MG/DL (ref 0–100)
MCH RBC QN AUTO: 31.7 PG (ref 26.8–34.3)
MCHC RBC AUTO-ENTMCNC: 32.1 G/DL (ref 31.4–37.4)
MCV RBC AUTO: 99 FL (ref 82–98)
NONHDLC SERPL-MCNC: 101 MG/DL
PLATELET # BLD AUTO: 211 THOUSANDS/UL (ref 149–390)
PMV BLD AUTO: 11.2 FL (ref 8.9–12.7)
POTASSIUM SERPL-SCNC: 4.8 MMOL/L (ref 3.5–5.3)
PROT SERPL-MCNC: 7.1 G/DL (ref 6.4–8.4)
RBC # BLD AUTO: 3.97 MILLION/UL (ref 3.81–5.12)
SODIUM SERPL-SCNC: 139 MMOL/L (ref 135–147)
TRIGL SERPL-MCNC: 105 MG/DL (ref ?–150)
WBC # BLD AUTO: 4.62 THOUSAND/UL (ref 4.31–10.16)

## 2025-05-06 PROCEDURE — 80053 COMPREHEN METABOLIC PANEL: CPT

## 2025-05-06 PROCEDURE — 36415 COLL VENOUS BLD VENIPUNCTURE: CPT

## 2025-05-06 PROCEDURE — 82306 VITAMIN D 25 HYDROXY: CPT

## 2025-05-06 PROCEDURE — 80061 LIPID PANEL: CPT

## 2025-05-06 PROCEDURE — 85027 COMPLETE CBC AUTOMATED: CPT
